# Patient Record
Sex: FEMALE | Race: WHITE | NOT HISPANIC OR LATINO | Employment: PART TIME | URBAN - METROPOLITAN AREA
[De-identification: names, ages, dates, MRNs, and addresses within clinical notes are randomized per-mention and may not be internally consistent; named-entity substitution may affect disease eponyms.]

---

## 2017-01-24 ENCOUNTER — OFFICE VISIT (OUTPATIENT)
Dept: FAMILY MEDICINE | Facility: CLINIC | Age: 62
End: 2017-01-24
Payer: COMMERCIAL

## 2017-01-24 VITALS
HEIGHT: 64 IN | TEMPERATURE: 98.2 F | DIASTOLIC BLOOD PRESSURE: 76 MMHG | SYSTOLIC BLOOD PRESSURE: 129 MMHG | WEIGHT: 239.8 LBS | BODY MASS INDEX: 40.94 KG/M2

## 2017-01-24 DIAGNOSIS — N30.01 ACUTE CYSTITIS WITH HEMATURIA: Primary | ICD-10-CM

## 2017-01-24 DIAGNOSIS — Z09 FOLLOW-UP EXAM AFTER TREATMENT: ICD-10-CM

## 2017-01-24 DIAGNOSIS — R39.9 UTI SYMPTOMS: ICD-10-CM

## 2017-01-24 LAB
ALBUMIN UR-MCNC: 100 MG/DL
APPEARANCE UR: CLEAR
BACTERIA #/AREA URNS HPF: ABNORMAL /HPF
BILIRUB UR QL STRIP: NEGATIVE
COLOR UR AUTO: YELLOW
GLUCOSE UR STRIP-MCNC: NEGATIVE MG/DL
HGB UR QL STRIP: ABNORMAL
KETONES UR STRIP-MCNC: 15 MG/DL
LEUKOCYTE ESTERASE UR QL STRIP: ABNORMAL
MUCOUS THREADS #/AREA URNS LPF: PRESENT /LPF
NITRATE UR QL: POSITIVE
NON-SQ EPI CELLS #/AREA URNS LPF: ABNORMAL /LPF
PH UR STRIP: 5 PH (ref 5–7)
RBC #/AREA URNS AUTO: ABNORMAL /HPF (ref 0–2)
SP GR UR STRIP: >1.03 (ref 1–1.03)
URN SPEC COLLECT METH UR: ABNORMAL
UROBILINOGEN UR STRIP-ACNC: 1 EU/DL (ref 0.2–1)
WBC #/AREA URNS AUTO: ABNORMAL /HPF (ref 0–2)

## 2017-01-24 PROCEDURE — 87186 SC STD MICRODIL/AGAR DIL: CPT | Performed by: FAMILY MEDICINE

## 2017-01-24 PROCEDURE — 99213 OFFICE O/P EST LOW 20 MIN: CPT | Performed by: FAMILY MEDICINE

## 2017-01-24 PROCEDURE — 87088 URINE BACTERIA CULTURE: CPT | Performed by: FAMILY MEDICINE

## 2017-01-24 PROCEDURE — 87086 URINE CULTURE/COLONY COUNT: CPT | Performed by: FAMILY MEDICINE

## 2017-01-24 PROCEDURE — 81001 URINALYSIS AUTO W/SCOPE: CPT | Performed by: FAMILY MEDICINE

## 2017-01-24 RX ORDER — NITROFURANTOIN 25; 75 MG/1; MG/1
100 CAPSULE ORAL 2 TIMES DAILY
Qty: 14 CAPSULE | Refills: 0 | Status: SHIPPED | OUTPATIENT
Start: 2017-01-24 | End: 2017-03-13

## 2017-01-24 NOTE — NURSING NOTE
"Chief Complaint   Patient presents with     UTI       Initial /76 mmHg  Temp(Src) 98.2  F (36.8  C) (Tympanic)  Ht 5' 4\" (1.626 m)  Wt 239 lb 12.8 oz (108.773 kg)  BMI 41.14 kg/m2  Breastfeeding? No Estimated body mass index is 41.14 kg/(m^2) as calculated from the following:    Height as of this encounter: 5' 4\" (1.626 m).    Weight as of this encounter: 239 lb 12.8 oz (108.773 kg).  BP completed using cuff size: large    "

## 2017-01-24 NOTE — PATIENT INSTRUCTIONS
Thank you for choosing [facility].  You may be receiving a survey in the mail from [facility] regarding your visit today.  Please take a few minutes to complete and return the survey to let us know how we are doing.      Our Clinic hours are:  Mondays    7:20 am - 7 pm  Tues -  Fri  7:20 am - 5 pm    Clinic Phone: [phone]    The clinic lab opens at 7:30 am Mon - Fri and appointments are required.    [facility]. [phone]  Monday-Thursday 8 am - 7pm  Tues/Wed/Fri 8 am - 5:30 pm

## 2017-01-24 NOTE — PROGRESS NOTES
"  SUBJECTIVE:                                                    Mirian Cruz is a 61 year old female who presents to clinic today for the following health issues:    Chief Complaint   Patient presents with     UTI     and concerns with mid back pain       URINARY TRACT SYMPTOMS     Onse2t: x 2 weeks     Description:   Painful urination (Dysuria): YES- at the end of urniation  Blood in urine (Hematuria): no   Delay in urine (Hesitency): YES    Intensity: moderate    Progression of Symptoms:  same    Accompanying Signs & Symptoms:  Fever/chills: no   Flank pain YES  Nausea and vomiting: no   Any vaginal symptoms: none  Abdominal/Pelvic Pain: YES   History:   History of frequent UTI's: YES  History of kidney stones: no   Sexually Active: no   Possibility of pregnancy: No    Precipitating factors:   -         Therapies Tried and outcome: AZO, Cranberry juice prn (contraindicated in Coumadin patients) and Increase fluid intake    Mirian is being followed by Urology for stress incontinence and recurrent UTIs.  Her most recent infection was E.coli with no resistance in early December. She presents now with recurrent symptoms.    OBJECTIVE: /76 mmHg  Temp(Src) 98.2  F (36.8  C) (Tympanic)  Ht 5' 4\" (1.626 m)  Wt 239 lb 12.8 oz (108.773 kg)  BMI 41.14 kg/m2  Breastfeeding? No    Results for orders placed or performed in visit on 01/24/17   *UA reflex to Microscopic   Result Value Ref Range    Color Urine Yellow     Appearance Urine Clear     Glucose Urine Negative NEG mg/dL    Bilirubin Urine Negative NEG    Ketones Urine 15 (A) NEG mg/dL    Specific Gravity Urine >1.030 1.003 - 1.035    Blood Urine Large (A) NEG    pH Urine 5.0 5.0 - 7.0 pH    Protein Albumin Urine 100 (A) NEG mg/dL    Urobilinogen Urine 1.0 0.2 - 1.0 EU/dL    Nitrite Urine Positive (A) NEG    Leukocyte Esterase Urine Small (A) NEG    Source Midstream Urine    Urine Microscopic   Result Value Ref Range    WBC Urine  (A) 0 - 2 /HPF "    RBC Urine 2-5 (A) 0 - 2 /HPF    Squamous Epithelial /LPF Urine Few FEW /LPF    Bacteria Urine Many (A) NEG /HPF    Mucous Urine Present (A) NEG /LPF     ASSESSMENT: UTI    PLAN: She has had some mild right flank discomfort suggestive of possible early renal involvement. Symptoms have been present for two weeks and have not responded just to frequent fluids. Her last treatment was a three day course of Septra, which she had taken in the past, but this time she broke out with mouth sores.  So this has been added to her allergy list, and she feels this time she would like a longer course of treatment in case the organism was not completely eradicated despite her improved symptoms.   Urine culture pending.   Rx nitrofurantoin 100 mg BID for one week.   Future order to recheck UA 24 hours after completing the antibiotic regimen.    Bianca Peralta md

## 2017-01-24 NOTE — MR AVS SNAPSHOT
After Visit Summary   1/24/2017    Mirian Cruz    MRN: [id number]           Patient Information     Date Of Birth          1955        Visit Information        Provider Department      1/24/2017 3:20 PM Bianca Peralta MD [facility]        Today's Diagnoses     Acute cystitis with hematuria    -  1     UTI symptoms         Follow-up exam after treatment           Care Instructions          Thank you for choosing [facility].  You may be receiving a survey in the [facility] your visit today.  Please take a few minutes to complete and return the survey to let us know how we are doing.      Our Clinic hours are:  Mondays    7:20 am - 7 pm  Tues -  Fri  7:20 am - 5 pm    Clinic Phone: [phone]    The clinic lab opens at 7:30 am Mon - Fri and appointments are required.    [facility]  Ph. [phone]  Monday-Thursday 8 am - 7pm  Tues/Wed/Fri 8 am - 5:30 pm               Follow-ups after your visit        Your next 10 appointments already scheduled     Apr 04, 2017  8:30 AM   Return Visit with Doni Benavides MD   [facility]    [address] [address]   [phone]              Future tests that were ordered for you today     Open Future Orders        Priority Expected Expires Ordered    UA with Microscopic reflex to Culture Routine 1/31/2017 2/7/2017 1/24/2017            Who to contact     If you have questions or need follow up information about today's clinic visit or your schedule please contact [facility] directly at [phone].  Normal or non-critical lab and imaging results will be communicated to you by MyChart, letter or phone within 4 business days after the clinic has received the results. If you do not hear from us within 7 days, please contact the clinic through MyChart or phone. If you have a critical or abnormal lab result,  "we will notify you by phone as soon as possible.  Submit refill requests through [employer] or call your pharmacy and they will forward the refill request to us. Please allow 3 business days for your refill to be completed.          Additional Information About Your Visit        [employer] Information     [employer] lets you send messages to your doctor, view your test results, renew your prescriptions, schedule appointments and more. To sign up, go to [email or web address] . Click on \"Log in\" on the left side of the screen, which will take you to the Welcome page. Then click on \"Sign up Now\" on the right side of the page.     You will be asked to enter the access code listed below, as well as some personal information. Please follow the directions to create your username and password.     Your access code is: [email or web address]  Expires: 3/1/2017  3:26 PM     Your access code will  in 90 days. If you need help or a new code, please call your [place] clinic or [phone].        Care EveryWhere ID     This is your Care EveryWhere ID. This could be used by other organizations to access your [place] medical records  [id number]        Your Vitals Were     Temperature Height BMI (Body Mass Index) Breastfeeding?          98.2  F (36.8  C) (Tympanic) 5' 4\" (1.626 m) 41.14 kg/m2 No         Blood Pressure from Last 3 Encounters:   17 129/76   16 119/82   10/04/16 150/79    Weight from Last 3 Encounters:   17 239 lb 12.8 oz (108.773 kg)   16 233 lb 6.4 oz (105.87 kg)   16 235 lb 6.4 oz (106.777 kg)              We Performed the Following     *UA reflex to Microscopic     Urine Culture Aerobic Bacterial     Urine Microscopic          Today's Medication Changes          These changes are accurate as of: 17  4:24 PM.  If you have any questions, ask your nurse or doctor.               Start taking these medicines.        Dose/Directions    nitrofurantoin (macrocrystal-monohydrate) 100 MG capsule "   Commonly known as:  MACROBID   Used for:  Acute cystitis with hematuria   Started by:  Bianca Peralta MD        Dose:  100 mg   Take 1 capsule (100 mg) by mouth 2 times daily   Quantity:  14 capsule   Refills:  0         Stop taking these medicines if you haven't already. Please contact your care team if you have questions.     sulfamethoxazole-trimethoprim 800-160 MG per tablet   Commonly known as:  BACTRIM DS/SEPTRA DS   Stopped by:  Bianca Peralta MD                Where to get your medicines      These medications were sent to [facility] [place] - [address]  [address], [place] [address]    Hours:  [facility] [place] [facility] Phone:  [phone]    - nitrofurantoin (macrocrystal-monohydrate) 100 MG capsule             Primary Care Provider Office Phone # Fax #    Bianca Peralta [phone]       [address] [address]        Thank you!     Thank you for choosing [facility]  for your care. Our goal is always to provide you with excellent care. Hearing back from our patients is one way we can continue to improve our services. Please take a few minutes to complete the written survey that you may receive in the mail after your visit with us. Thank you!             Your Updated Medication List - Protect others around you: Learn how to safely use, store and throw away your medicines at www.disposemymeds.org.          This list is accurate as of: 1/24/17  4:24 PM.  Always use your most recent med list.                   Brand Name Dispense Instructions for use    ascorbic acid 500 MG tablet    VITAMIN C    100 tablet    Take 1 tablet by mouth daily.       calcium 600 MG tablet      1 daily       cholecalciferol 1000 UNIT tablet    vitamin D     Take 1 tablet by mouth daily.       fish oil-omega-3 fatty acids 1000 MG capsule      1 daily       mirabegron 50 MG 24 hr tablet     MYRBETRIQ    90 tablet    Take 1 tablet (50 mg) by mouth daily       naproxen 500 MG tablet    NAPROSYN    60 tablet    Take 1 tablet (500 mg) by mouth 2 times daily (with meals) As needed for knee or hip pain       nitrofurantoin (macrocrystal-monohydrate) 100 MG capsule    MACROBID    14 capsule    Take 1 capsule (100 mg) by mouth 2 times daily       omeprazole 40 MG capsule    priLOSEC    90 capsule    Take 1 capsule (40 mg) by mouth daily Take 30-60 minutes before a meal.       vitamin E 400 UNIT capsule     100 capsule    Take 1 capsule by mouth daily.

## 2017-01-27 LAB
BACTERIA SPEC CULT: ABNORMAL
MICRO REPORT STATUS: ABNORMAL
MICROORGANISM SPEC CULT: ABNORMAL
SPECIMEN SOURCE: ABNORMAL

## 2017-02-08 ENCOUNTER — TRANSFERRED RECORDS (OUTPATIENT)
Dept: HEALTH INFORMATION MANAGEMENT | Facility: CLINIC | Age: 62
End: 2017-02-08

## 2017-02-08 DIAGNOSIS — Z09 FOLLOW-UP EXAM AFTER TREATMENT: ICD-10-CM

## 2017-02-08 DIAGNOSIS — N30.01 ACUTE CYSTITIS WITH HEMATURIA: ICD-10-CM

## 2017-02-08 LAB
ALBUMIN UR-MCNC: NEGATIVE MG/DL
APPEARANCE UR: CLEAR
BACTERIA #/AREA URNS HPF: ABNORMAL /HPF
BILIRUB UR QL STRIP: NEGATIVE
COLOR UR AUTO: YELLOW
GLUCOSE UR STRIP-MCNC: NEGATIVE MG/DL
HGB UR QL STRIP: NEGATIVE
KETONES UR STRIP-MCNC: NEGATIVE MG/DL
LEUKOCYTE ESTERASE UR QL STRIP: NEGATIVE
NITRATE UR QL: NEGATIVE
NON-SQ EPI CELLS #/AREA URNS LPF: ABNORMAL /LPF
PH UR STRIP: 7 PH (ref 5–7)
RBC #/AREA URNS AUTO: ABNORMAL /HPF (ref 0–2)
SP GR UR STRIP: 1.01 (ref 1–1.03)
URN SPEC COLLECT METH UR: ABNORMAL
UROBILINOGEN UR STRIP-ACNC: 1 EU/DL (ref 0.2–1)
WBC #/AREA URNS AUTO: ABNORMAL /HPF (ref 0–2)

## 2017-02-08 PROCEDURE — 81001 URINALYSIS AUTO W/SCOPE: CPT | Performed by: FAMILY MEDICINE

## 2017-03-13 ENCOUNTER — OFFICE VISIT (OUTPATIENT)
Dept: FAMILY MEDICINE | Facility: CLINIC | Age: 62
End: 2017-03-13
Payer: COMMERCIAL

## 2017-03-13 VITALS
HEART RATE: 83 BPM | DIASTOLIC BLOOD PRESSURE: 80 MMHG | SYSTOLIC BLOOD PRESSURE: 132 MMHG | BODY MASS INDEX: 40.32 KG/M2 | WEIGHT: 234.9 LBS

## 2017-03-13 DIAGNOSIS — N30.00 ACUTE CYSTITIS WITHOUT HEMATURIA: ICD-10-CM

## 2017-03-13 DIAGNOSIS — R30.0 DYSURIA: ICD-10-CM

## 2017-03-13 DIAGNOSIS — Z01.818 PREOP GENERAL PHYSICAL EXAM: Primary | ICD-10-CM

## 2017-03-13 DIAGNOSIS — R82.90 NONSPECIFIC FINDING ON EXAMINATION OF URINE: ICD-10-CM

## 2017-03-13 DIAGNOSIS — M17.12 PRIMARY OSTEOARTHRITIS OF LEFT KNEE: ICD-10-CM

## 2017-03-13 LAB
ALBUMIN UR-MCNC: 100 MG/DL
ANION GAP SERPL CALCULATED.3IONS-SCNC: 6 MMOL/L (ref 3–14)
APPEARANCE UR: CLEAR
BACTERIA #/AREA URNS HPF: ABNORMAL /HPF
BASOPHILS # BLD AUTO: 0 10E9/L (ref 0–0.2)
BASOPHILS NFR BLD AUTO: 0.5 %
BILIRUB UR QL STRIP: NEGATIVE
BUN SERPL-MCNC: 15 MG/DL (ref 7–30)
CALCIUM SERPL-MCNC: 8.8 MG/DL (ref 8.5–10.1)
CHLORIDE SERPL-SCNC: 103 MMOL/L (ref 94–109)
CO2 SERPL-SCNC: 28 MMOL/L (ref 20–32)
COLOR UR AUTO: YELLOW
CREAT SERPL-MCNC: 0.7 MG/DL (ref 0.52–1.04)
DIFFERENTIAL METHOD BLD: NORMAL
EOSINOPHIL # BLD AUTO: 0.2 10E9/L (ref 0–0.7)
EOSINOPHIL NFR BLD AUTO: 3 %
ERYTHROCYTE [DISTWIDTH] IN BLOOD BY AUTOMATED COUNT: 11.7 % (ref 10–15)
GFR SERPL CREATININE-BSD FRML MDRD: 85 ML/MIN/1.7M2
GLUCOSE SERPL-MCNC: 104 MG/DL (ref 70–99)
GLUCOSE UR STRIP-MCNC: NEGATIVE MG/DL
HCT VFR BLD AUTO: 39.4 % (ref 35–47)
HGB BLD-MCNC: 13.3 G/DL (ref 11.7–15.7)
HGB UR QL STRIP: ABNORMAL
KETONES UR STRIP-MCNC: NEGATIVE MG/DL
LEUKOCYTE ESTERASE UR QL STRIP: ABNORMAL
LYMPHOCYTES # BLD AUTO: 1.8 10E9/L (ref 0.8–5.3)
LYMPHOCYTES NFR BLD AUTO: 29.2 %
MCH RBC QN AUTO: 29.9 PG (ref 26.5–33)
MCHC RBC AUTO-ENTMCNC: 33.8 G/DL (ref 31.5–36.5)
MCV RBC AUTO: 89 FL (ref 78–100)
MONOCYTES # BLD AUTO: 0.4 10E9/L (ref 0–1.3)
MONOCYTES NFR BLD AUTO: 7.2 %
NEUTROPHILS # BLD AUTO: 3.7 10E9/L (ref 1.6–8.3)
NEUTROPHILS NFR BLD AUTO: 60.1 %
NITRATE UR QL: POSITIVE
NON-SQ EPI CELLS #/AREA URNS LPF: ABNORMAL /LPF
PH UR STRIP: 5 PH (ref 5–7)
PLATELET # BLD AUTO: 157 10E9/L (ref 150–450)
POTASSIUM SERPL-SCNC: 4.2 MMOL/L (ref 3.4–5.3)
RBC # BLD AUTO: 4.45 10E12/L (ref 3.8–5.2)
RBC #/AREA URNS AUTO: ABNORMAL /HPF (ref 0–2)
SODIUM SERPL-SCNC: 137 MMOL/L (ref 133–144)
SP GR UR STRIP: >1.03 (ref 1–1.03)
URN SPEC COLLECT METH UR: ABNORMAL
UROBILINOGEN UR STRIP-ACNC: 1 EU/DL (ref 0.2–1)
WBC # BLD AUTO: 6.1 10E9/L (ref 4–11)
WBC #/AREA URNS AUTO: ABNORMAL /HPF (ref 0–2)

## 2017-03-13 PROCEDURE — 87088 URINE BACTERIA CULTURE: CPT | Performed by: FAMILY MEDICINE

## 2017-03-13 PROCEDURE — 87186 SC STD MICRODIL/AGAR DIL: CPT | Performed by: FAMILY MEDICINE

## 2017-03-13 PROCEDURE — 80048 BASIC METABOLIC PNL TOTAL CA: CPT | Performed by: FAMILY MEDICINE

## 2017-03-13 PROCEDURE — 87086 URINE CULTURE/COLONY COUNT: CPT | Performed by: FAMILY MEDICINE

## 2017-03-13 PROCEDURE — 36415 COLL VENOUS BLD VENIPUNCTURE: CPT | Performed by: FAMILY MEDICINE

## 2017-03-13 PROCEDURE — 85025 COMPLETE CBC W/AUTO DIFF WBC: CPT | Performed by: FAMILY MEDICINE

## 2017-03-13 PROCEDURE — 81001 URINALYSIS AUTO W/SCOPE: CPT | Performed by: FAMILY MEDICINE

## 2017-03-13 PROCEDURE — 99214 OFFICE O/P EST MOD 30 MIN: CPT | Performed by: FAMILY MEDICINE

## 2017-03-13 RX ORDER — CEPHALEXIN 500 MG/1
500 CAPSULE ORAL 3 TIMES DAILY
Qty: 21 CAPSULE | Refills: 0 | Status: SHIPPED | OUTPATIENT
Start: 2017-03-13 | End: 2017-03-31

## 2017-03-13 RX ORDER — TRAMADOL HYDROCHLORIDE 50 MG/1
TABLET ORAL
Status: ON HOLD | COMMUNITY
Start: 2017-02-03 | End: 2017-04-06

## 2017-03-13 NOTE — PATIENT INSTRUCTIONS
Before Your Surgery    Call your surgeon if there is any change in your health. This includes signs of a cold or flu (such as a sore throat, runny nose, cough, rash or fever).    Do not smoke, drink alcohol or take over the counter medicine (unless your surgeon or primary care doctor tells you to) for the 24 hours before and after surgery.    If you take prescribed drugs: Follow your doctor s orders about which medicines to take and which to stop until after surgery.    Eating and drinking prior to surgery: follow the instructions from your surgeon    Take a shower or bath the night before surgery. Use the soap your surgeon gave you to gently clean your skin. If you do not have soap from your surgeon, use your regular soap. Do not shave or scrub the surgery site.  Wear clean pajamas and have clean sheets on your bed.     Health Maintenance   Topic Date Due     HEPATITIS C SCREENING  05/05/1975     INFLUENZA VACCINE (SYSTEM ASSIGNED)  09/01/2017     ADVANCE DIRECTIVE PLANNING Q5 YRS (NO INBASKET)  01/10/2018     MAMMO SCREEN Q2 YR (SYSTEM ASSIGNED)  04/25/2018     PAP Q3 YR  04/25/2019     TETANUS IMMUNIZATION (SYSTEM ASSIGNED)  07/14/2019     COLON CANCER SCREEN (SYSTEM ASSIGNED)  07/14/2019     LIPID SCREEN Q5 YR FEMALE (SYSTEM ASSIGNED)  04/12/2021     PAP Q5 YEARS  04/25/2021     HPV Q5 YEARS (Complete with PAP)  04/25/2021           Thank you for choosing [facility].  You may be receiving a survey in the mail from [facility] regarding your visit today.  Please take a few minutes to complete and return the survey to let us know how we are doing.      Our Clinic hours are:  Mondays    7:20 am - 7 pm  Tues -  Fri  7:20 am - 5 pm    Clinic Phone: [phone]    The clinic lab opens at 7:30 am Mon - Fri and appointments are required.    [facility]. [phone]  Monday-Thursday 8 am - 7pm  Tues/Wed/Fri 8 am - 5:30 pm     Take the antibiotic and we will call you with the culture. Once  treatment is completed, make a lab appointment to come in and recheck the urine so we have  A normal specimen recorded before surgery.

## 2017-03-13 NOTE — MR AVS SNAPSHOT
After Visit Summary   3/13/2017    Mirian Cruz    MRN: [id number]           Patient Information     Date Of Birth          1955        Visit Information        Provider Department      3/13/2017 9:20 AM Bianca Peralta MD [facility]        Today's Diagnoses     Preop general physical exam    -  1    Primary osteoarthritis of left knee        Acute cystitis without hematuria        Dysuria        Nonspecific finding on examination of urine          Care Instructions      Before Your Surgery    Call your surgeon if there is any change in your health. This includes signs of a cold or flu (such as a sore throat, runny nose, cough, rash or fever).    Do not smoke, drink alcohol or take over the counter medicine (unless your surgeon or primary care doctor tells you to) for the 24 hours before and after surgery.    If you take prescribed drugs: Follow your doctor s orders about which medicines to take and which to stop until after surgery.    Eating and drinking prior to surgery: follow the instructions from your surgeon    Take a shower or bath the night before surgery. Use the soap your surgeon gave you to gently clean your skin. If you do not have soap from your surgeon, use your regular soap. Do not shave or scrub the surgery site.  Wear clean pajamas and have clean sheets on your bed.     Health Maintenance   Topic Date Due     HEPATITIS C SCREENING  05/05/1975     INFLUENZA VACCINE (SYSTEM ASSIGNED)  09/01/2017     ADVANCE DIRECTIVE PLANNING Q5 YRS (NO INBASKET)  01/10/2018     MAMMO SCREEN Q2 YR (SYSTEM ASSIGNED)  04/25/2018     PAP Q3 YR  04/25/2019     TETANUS IMMUNIZATION (SYSTEM ASSIGNED)  07/14/2019     COLON CANCER SCREEN (SYSTEM ASSIGNED)  07/14/2019     LIPID SCREEN Q5 YR FEMALE (SYSTEM ASSIGNED)  04/12/2021     PAP Q5 YEARS  04/25/2021     HPV Q5 YEARS (Complete with PAP)  04/25/2021           Thank you for choosing [facility].  You may be receiving a  survey in the mail from [facility] regarding your visit today.  Please take a few minutes to complete and return the survey to let us know how we are doing.      Our Clinic hours are:  Mondays    7:20 am - 7 pm  Tues -  Fri  7:20 am - 5 pm    Clinic Phone: [phone]    The clinic lab opens at 7:30 am Mon - Fri and appointments are required.    [facility] [place]  Ph. [phone]  Monday-Thursday 8 am - 7pm  Tues/Wed/Fri 8 am - 5:30 pm     Take the antibiotic and we will call you with the culture. Once treatment is completed, make a lab appointment to come in and recheck the urine so we have  A normal specimen recorded before surgery.        Follow-ups after your visit        Your next 10 appointments already scheduled     Apr 04, 2017  8:30 AM CDT   Return Visit with Doni [facility]ides MD   [facility]    [address] [address]   [phone]            Apr 05, 2017   Procedure with Zelalem Mendez MD   [facility] (--)    [address] [address]   [phone]           The medical center is located at [address] (between [address] in [place], [place] north of [place]).              Future tests that were ordered for you today     Open Future Orders        Priority Expected Expires Ordered    UA with Microscopic reflex to Culture Routine 3/20/2017 3/30/2017 3/13/2017            Who to contact     If you have questions or need follow up information about today's clinic visit or your schedule please contact [facility] directly at [phone].  Normal or non-critical lab and imaging results will be communicated to you by MyChart, letter or phone within 4 business days after the clinic has received the results. If you do not hear from us within 7 days, please contact the clinic through MyChart or phone. If you have a critical or abnormal lab result, we  "will notify you by phone as soon as possible.  Submit refill requests through [employer] or call your pharmacy and they will forward the refill request to us. Please allow 3 business days for your refill to be completed.          Additional Information About Your Visit        [employer] Information     [employer] lets you send messages to your doctor, view your test results, renew your prescriptions, schedule appointments and more. To sign up, go to [email or web address] . Click on \"Log in\" on the left side of the screen, which will take you to the Welcome page. Then click on \"Sign up Now\" on the right side of the page.     You will be asked to enter the access code listed below, as well as some personal information. Please follow the directions to create your username and password.     Your access code is: [email or web address]  Expires: 2017 10:12 AM     Your access code will  in 90 days. If you need help or a new code, please call your [place] clinic or [phone].        Care EveryWhere ID     This is your Care EveryWhere ID. This could be used by other organizations to access your [place] medical records  [id number]        Your Vitals Were     Pulse BMI (Body Mass Index)                83 40.32 kg/m2           Blood Pressure from Last 3 Encounters:   17 132/80   17 129/76   16 119/82    Weight from Last 3 Encounters:   17 234 lb 14.4 oz (106.5 kg)   17 239 lb 12.8 oz (108.8 kg)   16 233 lb 6.4 oz (105.9 kg)              We Performed the Following     *UA reflex to Microscopic and Culture ([facility] (except [place] and [place])     Urine Culture Aerobic Bacterial     Urine Microscopic          Today's Medication Changes          These changes are accurate as of: 3/13/17 10:12 AM.  If you have any questions, ask your nurse or doctor.               Start taking these medicines.        Dose/Directions    cephALEXin 500 MG capsule   Commonly known " as:  KEFLEX   Used for:  Acute cystitis without hematuria   Started by:  Bianca Peralta MD        Dose:  500 mg   Take 1 capsule (500 mg) by mouth 3 times daily   Quantity:  21 capsule   Refills:  0            Where to get your medicines      These medications were sent to [facility] [place] - [address]  [address], [place] [address]    Hours:  [facility] Phone:  [phone]     cephALEXin 500 MG capsule                Primary Care Provider Office Phone # Fax #    Bianca Peralta [phone]       [facility] [address] [address]        Thank you!     Thank you for choosing [facility]  for your care. Our goal is always to provide you with excellent care. Hearing back from our patients is one way we can continue to improve our services. Please take a few minutes to complete the written survey that you may receive in the mail after your visit with us. Thank you!             Your Updated Medication List - Protect others around you: Learn how to safely use, store and throw away your medicines at www.disposemymeds.org.          This list is accurate as of: 3/13/17 10:12 AM.  Always use your most recent med list.                   Brand Name Dispense Instructions for use    ascorbic acid 500 MG tablet    VITAMIN C    100 tablet    Take 1 tablet by mouth daily.       calcium 600 MG tablet      1 daily       cephALEXin 500 MG capsule    KEFLEX    21 capsule    Take 1 capsule (500 mg) by mouth 3 times daily       cholecalciferol 1000 UNIT tablet    vitamin D     Take 1 tablet by mouth daily.       fish oil-omega-3 fatty acids 1000 MG capsule      1 daily       mirabegron 50 MG 24 hr tablet    MYRBETRIQ    90 tablet    Take 1 tablet (50 mg) by mouth daily       naproxen 500 MG tablet    NAPROSYN    60 tablet    Take 1 tablet (500 mg) by mouth 2 times daily (with meals) As needed for knee or  hip pain       omeprazole 40 MG capsule    priLOSEC    90 capsule    Take 1 capsule (40 mg) by mouth daily Take 30-60 minutes before a meal.       traMADol 50 MG tablet    ULTRAM         vitamin E 400 UNIT capsule     100 capsule    Take 1 capsule by mouth daily.

## 2017-03-13 NOTE — PROGRESS NOTES
[facility]  [address]  [facility] [address]  [phone]  Dept: [phone]    PRE-OP EVALUATION:  Today's date: 3/13/2017    Mirian Cruz (: 1955) presents for pre-operative evaluation assessment as requested by Dr. Mendez.  She requires evaluation and anesthesia risk assessment prior to undergoing surgery/procedure for treatment of Knee Left .  Proposed procedure: Total Left Knee Arthroplasty    Date of Surgery/ Procedure: 2017  Time of Surgery/ Procedure: 12:00am   Hospital/Surgical Facility: [place]  Primary Physician: Bianca Peralta  Type of Anesthesia Anticipated: to be determined    Patient has a Health Care Directive or Living Will:  NO    1. NO - Do you have a history of heart attack, stroke, stent, bypass or surgery on an artery in the head, neck, heart or legs?  2. NO - Do you ever have any pain or discomfort in your chest?  3. NO - Do you have a history of  Heart Failure?  4. NO - Are you troubled by shortness of breath when: walking on the level, up a slight hill or at night?  5. NO - Do you currently have a cold, bronchitis or other respiratory infection?  6. NO - Do you have a cough, shortness of breath or wheezing?  7. NO - Do you sometimes get pains in the calves of your legs when you walk?  8. NO - Do you or anyone in your family have previous history of blood clots?  9. NO - Do you or does anyone in your family have a serious bleeding problem such as prolonged bleeding following surgeries or cuts?  10. NO - Have you ever had problems with anemia or been told to take iron pills?  11. NO - Have you had any abnormal blood loss such as black, tarry or bloody stools, or abnormal vaginal bleeding?  12. NO - Have you ever had a blood transfusion?  13. NO - Have you or any of your relatives ever had problems with anesthesia?  14. NO - Do you have sleep apnea, excessive snoring or daytime drowsiness?  15. NO - DO YOU HAVE ANY PROSTHETIC HEART  VALVES?  15. NO - Do you have any prosthetic heart valves?  16. YES - DO YOU HAVE PROSTHETIC JOINTS? Right knee surgery   17. NO - Is there any chance that you may be pregnant?      HPI:                                                      Brief HPI related to upcoming procedure: Mirian Cruz is a 61 year old female with end stage arthritis of her left knee, now scheduled for left knee replacement.    See problem list for active medical problems.  Problems all longstanding and stable, except as noted/documented.  See ROS for pertinent symptoms related to these conditions.                                                                                                  .    MEDICAL HISTORY:                                                      Patient Active Problem List    Diagnosis Date Noted     Prediabetes 04/13/2016     Priority: Medium     Status post total right knee replacement 02/18/2015     Priority: Medium     OA (osteoarthritis) of knee 02/10/2015     Priority: Medium     Urinary, incontinence, stress female 05/07/2013     Priority: Medium     Urethral hypermobility 05/07/2013     Priority: Medium     Advanced directives, counseling/discussion 01/10/2013     Priority: Medium     Patient does not have an Advance/Health Care Directive (HCD), declines information/referral.    Mai Muse  January 10, 2013         Sleep related leg cramps 01/10/2013     Priority: Medium     Improve with gabapentin       GBS (Guillain-[place] syndrome) (H) 10/08/2011     Priority: Medium     Hospitalized 10/1/2011 - 10/8/2011  In acute rehab 10/9/2011 - 10/18/2011       Hyperlipidemia LDL goal <130 10/31/2010     Priority: Medium     GERD (gastroesophageal reflux disease) 10/27/2009     Priority: Medium     Morbid obesity (H) 10/27/2009     Priority: Medium     Tobacco use disorder 08/20/2008     Priority: Medium     Very rare use of cigarette, does use some e-cigarettes.  4/24/16 -- patient stopped all nicotine a few  weeks ago        No past medical history on file.  Past Surgical History   Procedure Laterality Date     Tubal ligation       Bunionectomy chevron and jenni, combined  3/25/2011     COMBINED BUNIONECTOMY CHEVRON AND JENNI performed by TRISTON SEWELL at [facility]     Remove hardware foot  2/15/2012     Procedure:REMOVE HARDWARE FOOT; Removal of hardware left foot; Surgeon:TRISTON SEWELL; Location:[facility] OR     Sling transvaginal  5/20/2013     Procedure: SLING TRANSVAGINAL;  Transvaginal taping, cystoscopy;  Surgeon: Adan Morrison MD;  Location: [facility]     Cystoscopy  5/20/2013     Procedure: CYSTOSCOPY;;  Surgeon: Adan Morrison MD;  Location: [facility]     Arthroplasty knee Right 2/18/2015     Procedure: ARTHROPLASTY KNEE;  Surgeon: Zelalem Mendez MD;  Location: [facility]     Current Outpatient Prescriptions   Medication Sig Dispense Refill     naproxen (NAPROSYN) 500 MG tablet Take 1 tablet (500 mg) by mouth 2 times daily (with meals) As needed for knee or hip pain 60 tablet 5     mirabegron (MYRBETRIQ) 50 MG 24 hr tablet Take 1 tablet (50 mg) by mouth daily 90 tablet 1     omeprazole (PRILOSEC) 40 MG capsule Take 1 capsule (40 mg) by mouth daily Take 30-60 minutes before a meal. 90 capsule 3     cholecalciferol (VITAMIN D) 1000 UNIT tablet Take 1 tablet by mouth daily.       ascorbic acid (VITAMIN C) 500 MG tablet Take 1 tablet by mouth daily. 100 tablet 12     vitamin E 400 UNIT capsule Take 1 capsule by mouth daily. 100 capsule 12     CALCIUM 600 MG OR TABS 1 daily       FISH OIL 1000 MG OR CAPS 1 daily       traMADol (ULTRAM) 50 MG tablet        OTC products: None, except as noted above    Allergies   Allergen Reactions     Septra [Sulfamethoxazole W/Trimethoprim] Other (See Comments)     Stomatitis with mouth ulcerations     Influenza Vaccine Live      History of Guillain-[place]      Latex Allergy: NO    Social History   Substance Use Topics     Smoking status: Former Smoker     Types:  Cigarettes     Quit date: 7/18/2015     Smokeless tobacco: Never Used     Alcohol use 0.0 oz/week     0 Standard drinks or equivalent per week      Comment: occ     History   Drug Use No       REVIEW OF SYSTEMS:                                                    C: NEGATIVE for fever, chills, change in weight  I: NEGATIVE for worrisome rashes, moles or lesions  E: NEGATIVE for vision changes or irritation  E/M: NEGATIVE for ear, mouth and throat problems  R: NEGATIVE for significant cough or SOB  CV: NEGATIVE for chest pain, palpitations or peripheral edema  GI: NEGATIVE for nausea, abdominal pain, heartburn, or change in bowel habits   female: history of recurrent UTI, now with symptoms again --UA/UC pending  MUSCULOSKELETAL:other than knee pain, she does note some numbness/pain in her hands which she thinks is carpal tunnel -- she is just starting the use of wrist braces and will follow up on this at a later date  N: NEGATIVE for weakness, dizziness or paresthesias  E: NEGATIVE for temperature intolerance, skin/hair changes  H: NEGATIVE for bleeding problems  P: NEGATIVE for changes in mood or affect    EXAM:                                                    /80  Pulse 83  Wt 234 lb 14.4 oz (106.5 kg)  BMI 40.32 kg/m2    GENERAL APPEARANCE: healthy, alert, active and obese     EYES: EOMI, PERRL     HENT: ear canals and TM's normal and nose and mouth without ulcers or lesions     NECK: no adenopathy, no asymmetry, masses, or scars and thyroid normal to palpation     RESP: lungs clear to auscultation - no rales, rhonchi or wheezes     CV: regular rates and rhythm, normal S1 S2, no S3 or S4 and peripheral pulses strong     ABDOMEN: soft, nontender, without hepatosplenomegaly or masses     MS: peripheral pulses normal and limping due to left knee pain     NEURO: Normal strength and tone, sensory exam grossly normal, mentation intact and speech normal     PSYCH: mentation appears normal. and affect  normal/bright     LYMPHATICS: No axillary, cervical, or supraclavicular nodes    DIAGNOSTICS:                                                      Results for orders placed or performed in visit on 03/13/17   *UA reflex to Microscopic and Culture ([facility] (except [place] and [place])   Result Value Ref Range    Color Urine Yellow     Appearance Urine Clear     Glucose Urine Negative NEG mg/dL    Bilirubin Urine Negative NEG    Ketones Urine Negative NEG mg/dL    Specific Gravity Urine >1.030 1.003 - 1.035    Blood Urine Moderate (A) NEG    pH Urine 5.0 5.0 - 7.0 pH    Protein Albumin Urine 100 (A) NEG mg/dL    Urobilinogen Urine 1.0 0.2 - 1.0 EU/dL    Nitrite Urine Positive (A) NEG    Leukocyte Esterase Urine Small (A) NEG    Source Midstream Urine    Urine Microscopic   Result Value Ref Range    WBC Urine 10-25 (A) 0 - 2 /HPF    RBC Urine 5-10 (A) 0 - 2 /HPF    Squamous Epithelial /LPF Urine Few FEW /LPF    Bacteria Urine Few (A) NEG /HPF     Component      Latest Ref Rng & Units 3/13/2017   WBC      4.0 - 11.0 10e9/L 6.1   RBC Count      3.8 - 5.2 10e12/L 4.45   Hemoglobin      11.7 - 15.7 g/dL 13.3   Hematocrit      35.0 - 47.0 % 39.4   MCV      78 - 100 fl 89   MCH      26.5 - 33.0 pg 29.9   MCHC      31.5 - 36.5 g/dL 33.8   RDW      10.0 - 15.0 % 11.7   Platelet Count      150 - 450 10e9/L 157   Diff Method       Automated Method   % Neutrophils      % 60.1   % Lymphocytes      % 29.2   % Monocytes      % 7.2   % Eosinophils      % 3.0   % Basophils      % 0.5   Absolute Neutrophil      1.6 - 8.3 10e9/L 3.7   Absolute Lymphocytes      0.8 - 5.3 10e9/L 1.8   Absolute Monocytes      0.0 - 1.3 10e9/L 0.4   Absolute Eosinophils      0.0 - 0.7 10e9/L 0.2   Absolute Basophils      0.0 - 0.2 10e9/L 0.0   Sodium      133 - 144 mmol/L 137   Potassium      3.4 - 5.3 mmol/L 4.2   Chloride      94 - 109 mmol/L 103   Carbon Dioxide      20 - 32 mmol/L 28   Anion Gap      3 - 14 mmol/L 6    Glucose      70 - 99 mg/dL 104 (H)   Urea Nitrogen      7 - 30 mg/dL 15   Creatinine      0.52 - 1.04 mg/dL 0.70   GFR Estimate      >60 mL/min/1.7m2 85   GFR Estimate If Black      >60 mL/min/1.7m2 >90 . . .   Calcium      8.5 - 10.1 mg/dL 8.8       Recent Labs   Lab Test  04/12/16   0917  02/21/15   0620  02/20/15   0624  02/19/15   0610  02/18/15   1935  02/10/15   1636  05/08/14   1622   HGB   --    --   11.0*  11.5*   --   14.0   --    PLT   --   123*   --    --   148*  172   --    NA   --    --    --    --    --   140  142   POTASSIUM   --    --    --    --    --   4.7  4.3   CR   --    --    --    --   0.64  0.78  0.74   A1C  5.9   --    --    --    --    --   5.8        IMPRESSION:                                                    Reason for surgery/procedure: end stage arthritis left knee, scheduled for knee replacement    The proposed surgical procedure is considered INTERMEDIATE risk.    REVISED CARDIAC RISK INDEX  The patient has the following serious cardiovascular risks for perioperative complications such as (MI, PE, VFib and 3  AV Block):  No serious cardiac risks  INTERPRETATION: 0 risks: Class I (very low risk - 0.4% complication rate)    The patient has the following additional risks for perioperative complications:  No identified additional risks      ICD-10-CM    1. Preop general physical exam Z01.818 Urine Microscopic   2. Primary osteoarthritis of left knee M17.12    3. Acute cystitis without hematuria N30.00    4. Dysuria R30.0 *UA reflex to Microscopic and Culture ([facility] (except [place] and [place])   5. Nonspecific finding on examination of urine R82.90 Urine Culture Aerobic Bacterial       RECOMMENDATIONS:                                                    Patient will hold all medications the AM of surgery.  We will start her on cephalexin today for one week for the current UTI and ajust this if needed once the culture is back, will also plan to  recheck UA after treatment.          APPROVAL GIVEN to proceed with proposed procedure, without further diagnostic evaluation       Signed Electronically by: Bianca Peralta MD        3/31/17 addendum:  Follow-up UA after treatment:    Component      Latest Ref Rng & Units 3/30/2017   Color Urine       Yellow   Appearance Urine       Clear   Glucose Urine      NEG mg/dL Negative   Bilirubin Urine      NEG Negative   Ketones Urine      NEG mg/dL 15 (A)   Specific Gravity Urine      1.003 - 1.035 >1.030   pH Urine      5.0 - 7.0 pH 5.0   Protein Albumin Urine      NEG mg/dL 100 (A)   Urobilinogen Urine      0.2 - 1.0 EU/dL 0.2   Nitrite Urine      NEG Negative   Blood Urine      NEG Negative   Leukocyte Esterase Urine      NEG Negative   Source       Midstream Urine   WBC Urine      0 - 2 /HPF O - 2   RBC Urine      0 - 2 /HPF O - 2   Squamous Epithelial /LPF Urine      FEW /LPF Few       Bianca Peralta md    Copy of this evaluation report is provided to requesting physician.    [facility] Preop Guidelines

## 2017-03-19 ENCOUNTER — TRANSFERRED RECORDS (OUTPATIENT)
Dept: HEALTH INFORMATION MANAGEMENT | Facility: CLINIC | Age: 62
End: 2017-03-19

## 2017-03-30 DIAGNOSIS — Z01.818 PREOP GENERAL PHYSICAL EXAM: ICD-10-CM

## 2017-03-30 LAB
ALBUMIN UR-MCNC: 100 MG/DL
APPEARANCE UR: CLEAR
BILIRUB UR QL STRIP: NEGATIVE
COLOR UR AUTO: YELLOW
GLUCOSE UR STRIP-MCNC: NEGATIVE MG/DL
HGB UR QL STRIP: NEGATIVE
KETONES UR STRIP-MCNC: 15 MG/DL
LEUKOCYTE ESTERASE UR QL STRIP: NEGATIVE
NITRATE UR QL: NEGATIVE
NON-SQ EPI CELLS #/AREA URNS LPF: ABNORMAL /LPF
PH UR STRIP: 5 PH (ref 5–7)
RBC #/AREA URNS AUTO: ABNORMAL /HPF (ref 0–2)
SP GR UR STRIP: >1.03 (ref 1–1.03)
URN SPEC COLLECT METH UR: ABNORMAL
UROBILINOGEN UR STRIP-ACNC: 0.2 EU/DL (ref 0.2–1)
WBC #/AREA URNS AUTO: ABNORMAL /HPF (ref 0–2)

## 2017-03-30 PROCEDURE — 81001 URINALYSIS AUTO W/SCOPE: CPT | Performed by: FAMILY MEDICINE

## 2017-04-03 ENCOUNTER — ANESTHESIA EVENT (OUTPATIENT)
Dept: SURGERY | Facility: CLINIC | Age: 62
DRG: 470 | End: 2017-04-03
Payer: COMMERCIAL

## 2017-04-05 ENCOUNTER — APPOINTMENT (OUTPATIENT)
Dept: GENERAL RADIOLOGY | Facility: CLINIC | Age: 62
DRG: 470 | End: 2017-04-05
Attending: ORTHOPAEDIC SURGERY
Payer: COMMERCIAL

## 2017-04-05 ENCOUNTER — HOSPITAL ENCOUNTER (INPATIENT)
Facility: CLINIC | Age: 62
LOS: 3 days | Discharge: HOME HEALTH | DRG: 470 | End: 2017-04-08
Attending: ORTHOPAEDIC SURGERY | Admitting: ORTHOPAEDIC SURGERY
Payer: COMMERCIAL

## 2017-04-05 ENCOUNTER — ANESTHESIA (OUTPATIENT)
Dept: SURGERY | Facility: CLINIC | Age: 62
DRG: 470 | End: 2017-04-05
Payer: COMMERCIAL

## 2017-04-05 DIAGNOSIS — Z96.652 STATUS POST TOTAL LEFT KNEE REPLACEMENT: Primary | ICD-10-CM

## 2017-04-05 PROBLEM — M17.12 LEFT KNEE DJD: Status: ACTIVE | Noted: 2017-04-05

## 2017-04-05 LAB
BASOPHILS # BLD AUTO: 0 10E9/L (ref 0–0.2)
BASOPHILS NFR BLD AUTO: 0.5 %
DIFFERENTIAL METHOD BLD: NORMAL
EOSINOPHIL # BLD AUTO: 0.2 10E9/L (ref 0–0.7)
EOSINOPHIL NFR BLD AUTO: 3.9 %
ERYTHROCYTE [DISTWIDTH] IN BLOOD BY AUTOMATED COUNT: 11.8 % (ref 10–15)
HCT VFR BLD AUTO: 42.7 % (ref 35–47)
HGB BLD-MCNC: 14.4 G/DL (ref 11.7–15.7)
IMM GRANULOCYTES # BLD: 0 10E9/L (ref 0–0.4)
IMM GRANULOCYTES NFR BLD: 0.2 %
INR PPP: 0.92 (ref 0.86–1.14)
LYMPHOCYTES # BLD AUTO: 2.1 10E9/L (ref 0.8–5.3)
LYMPHOCYTES NFR BLD AUTO: 33.7 %
MCH RBC QN AUTO: 29.3 PG (ref 26.5–33)
MCHC RBC AUTO-ENTMCNC: 33.7 G/DL (ref 31.5–36.5)
MCV RBC AUTO: 87 FL (ref 78–100)
MONOCYTES # BLD AUTO: 0.5 10E9/L (ref 0–1.3)
MONOCYTES NFR BLD AUTO: 8.2 %
NEUTROPHILS # BLD AUTO: 3.3 10E9/L (ref 1.6–8.3)
NEUTROPHILS NFR BLD AUTO: 53.5 %
PLATELET # BLD AUTO: 177 10E9/L (ref 150–450)
RBC # BLD AUTO: 4.92 10E12/L (ref 3.8–5.2)
WBC # BLD AUTO: 6.2 10E9/L (ref 4–11)

## 2017-04-05 PROCEDURE — 71000012 ZZH RECOVERY PHASE 1 LEVEL 1 FIRST HR: Performed by: ORTHOPAEDIC SURGERY

## 2017-04-05 PROCEDURE — 27810169 ZZH OR IMPLANT GENERAL: Performed by: ORTHOPAEDIC SURGERY

## 2017-04-05 PROCEDURE — 37000009 ZZH ANESTHESIA TECHNICAL FEE, EACH ADDTL 15 MIN: Performed by: ORTHOPAEDIC SURGERY

## 2017-04-05 PROCEDURE — 25000128 H RX IP 250 OP 636: Performed by: PHYSICIAN ASSISTANT

## 2017-04-05 PROCEDURE — 36000063 ZZH SURGERY LEVEL 4 EA 15 ADDTL MIN: Performed by: ORTHOPAEDIC SURGERY

## 2017-04-05 PROCEDURE — 36000093 ZZH SURGERY LEVEL 4 1ST 30 MIN: Performed by: ORTHOPAEDIC SURGERY

## 2017-04-05 PROCEDURE — 40000940 XR KNEE PORT LT 1/2 VW: Mod: LT

## 2017-04-05 PROCEDURE — 36415 COLL VENOUS BLD VENIPUNCTURE: CPT | Performed by: PHYSICIAN ASSISTANT

## 2017-04-05 PROCEDURE — 25800025 ZZH RX 258: Performed by: NURSE ANESTHETIST, CERTIFIED REGISTERED

## 2017-04-05 PROCEDURE — 0SRD0J9 REPLACEMENT OF LEFT KNEE JOINT WITH SYNTHETIC SUBSTITUTE, CEMENTED, OPEN APPROACH: ICD-10-PCS | Performed by: ORTHOPAEDIC SURGERY

## 2017-04-05 PROCEDURE — 25000125 ZZHC RX 250: Performed by: PHYSICIAN ASSISTANT

## 2017-04-05 PROCEDURE — 25000132 ZZH RX MED GY IP 250 OP 250 PS 637: Performed by: PHYSICIAN ASSISTANT

## 2017-04-05 PROCEDURE — 40000305 ZZH STATISTIC PRE PROC ASSESS I: Performed by: ORTHOPAEDIC SURGERY

## 2017-04-05 PROCEDURE — 27210794 ZZH OR GENERAL SUPPLY STERILE: Performed by: ORTHOPAEDIC SURGERY

## 2017-04-05 PROCEDURE — 25000128 H RX IP 250 OP 636: Performed by: NURSE ANESTHETIST, CERTIFIED REGISTERED

## 2017-04-05 PROCEDURE — 25000125 ZZHC RX 250: Performed by: NURSE ANESTHETIST, CERTIFIED REGISTERED

## 2017-04-05 PROCEDURE — 12000007 ZZH R&B INTERMEDIATE

## 2017-04-05 PROCEDURE — 25000128 H RX IP 250 OP 636: Performed by: ORTHOPAEDIC SURGERY

## 2017-04-05 PROCEDURE — C1776 JOINT DEVICE (IMPLANTABLE): HCPCS | Performed by: ORTHOPAEDIC SURGERY

## 2017-04-05 PROCEDURE — 25000132 ZZH RX MED GY IP 250 OP 250 PS 637: Performed by: ORTHOPAEDIC SURGERY

## 2017-04-05 PROCEDURE — 85610 PROTHROMBIN TIME: CPT | Performed by: PHYSICIAN ASSISTANT

## 2017-04-05 PROCEDURE — 37000008 ZZH ANESTHESIA TECHNICAL FEE, 1ST 30 MIN: Performed by: ORTHOPAEDIC SURGERY

## 2017-04-05 PROCEDURE — 85025 COMPLETE CBC W/AUTO DIFF WBC: CPT | Performed by: PHYSICIAN ASSISTANT

## 2017-04-05 PROCEDURE — 27110028 ZZH OR GENERAL SUPPLY NON-STERILE: Performed by: ORTHOPAEDIC SURGERY

## 2017-04-05 DEVICE — IMP BASEPLATE TIBIAL HOWM TRI 3 5520-B-300: Type: IMPLANTABLE DEVICE | Site: KNEE | Status: FUNCTIONAL

## 2017-04-05 DEVICE — BONE CEMENT PALACOS 00-1112-140-01: Type: IMPLANTABLE DEVICE | Site: KNEE | Status: FUNCTIONAL

## 2017-04-05 DEVICE — IMP COMP FEM STRK TRIATHLN PS LT 4 5515-F-401: Type: IMPLANTABLE DEVICE | Site: KNEE | Status: FUNCTIONAL

## 2017-04-05 DEVICE — IMP COMP PATELLA HOWM TRI ASYM 35X10MM 5551-G-350: Type: IMPLANTABLE DEVICE | Site: KNEE | Status: FUNCTIONAL

## 2017-04-05 DEVICE — IMP INSERT TIBIAL HOWM TRI SIZE 3 11MM 5532-G-311: Type: IMPLANTABLE DEVICE | Site: KNEE | Status: FUNCTIONAL

## 2017-04-05 RX ORDER — MIRABEGRON 50 MG/1
50 TABLET, EXTENDED RELEASE ORAL DAILY
Status: DISCONTINUED | OUTPATIENT
Start: 2017-04-06 | End: 2017-04-08 | Stop reason: HOSPADM

## 2017-04-05 RX ORDER — ACETAMINOPHEN 325 MG/1
975 TABLET ORAL EVERY 8 HOURS
Status: COMPLETED | OUTPATIENT
Start: 2017-04-05 | End: 2017-04-08

## 2017-04-05 RX ORDER — ONDANSETRON 4 MG/1
4 TABLET, ORALLY DISINTEGRATING ORAL EVERY 6 HOURS PRN
Status: DISCONTINUED | OUTPATIENT
Start: 2017-04-05 | End: 2017-04-08 | Stop reason: HOSPADM

## 2017-04-05 RX ORDER — LIDOCAINE 40 MG/G
CREAM TOPICAL
Status: DISCONTINUED | OUTPATIENT
Start: 2017-04-05 | End: 2017-04-08 | Stop reason: HOSPADM

## 2017-04-05 RX ORDER — NICOTINE 21 MG/24HR
1 PATCH, TRANSDERMAL 24 HOURS TRANSDERMAL DAILY
Status: DISCONTINUED | OUTPATIENT
Start: 2017-04-05 | End: 2017-04-08 | Stop reason: HOSPADM

## 2017-04-05 RX ORDER — CEFAZOLIN SODIUM 2 G/100ML
2 INJECTION, SOLUTION INTRAVENOUS
Status: COMPLETED | OUTPATIENT
Start: 2017-04-05 | End: 2017-04-05

## 2017-04-05 RX ORDER — NALOXONE HYDROCHLORIDE 0.4 MG/ML
.1-.4 INJECTION, SOLUTION INTRAMUSCULAR; INTRAVENOUS; SUBCUTANEOUS
Status: DISCONTINUED | OUTPATIENT
Start: 2017-04-05 | End: 2017-04-08 | Stop reason: HOSPADM

## 2017-04-05 RX ORDER — GABAPENTIN 300 MG/1
300 CAPSULE ORAL 2 TIMES DAILY
Status: COMPLETED | OUTPATIENT
Start: 2017-04-05 | End: 2017-04-08

## 2017-04-05 RX ORDER — BUPIVACAINE HYDROCHLORIDE 7.5 MG/ML
INJECTION, SOLUTION INTRASPINAL PRN
Status: DISCONTINUED | OUTPATIENT
Start: 2017-04-05 | End: 2017-04-05

## 2017-04-05 RX ORDER — PROCHLORPERAZINE MALEATE 5 MG
5-10 TABLET ORAL EVERY 6 HOURS PRN
Status: DISCONTINUED | OUTPATIENT
Start: 2017-04-05 | End: 2017-04-08 | Stop reason: HOSPADM

## 2017-04-05 RX ORDER — FENTANYL CITRATE 50 UG/ML
INJECTION, SOLUTION INTRAMUSCULAR; INTRAVENOUS PRN
Status: DISCONTINUED | OUTPATIENT
Start: 2017-04-05 | End: 2017-04-05

## 2017-04-05 RX ORDER — DIAZEPAM 5 MG
5 TABLET ORAL EVERY 6 HOURS PRN
Status: DISCONTINUED | OUTPATIENT
Start: 2017-04-05 | End: 2017-04-08 | Stop reason: HOSPADM

## 2017-04-05 RX ORDER — OXYCODONE HYDROCHLORIDE 5 MG/1
5-10 TABLET ORAL
Status: DISCONTINUED | OUTPATIENT
Start: 2017-04-05 | End: 2017-04-08 | Stop reason: HOSPADM

## 2017-04-05 RX ORDER — FENTANYL CITRATE 50 UG/ML
25-50 INJECTION, SOLUTION INTRAMUSCULAR; INTRAVENOUS
Status: DISCONTINUED | OUTPATIENT
Start: 2017-04-05 | End: 2017-04-05 | Stop reason: HOSPADM

## 2017-04-05 RX ORDER — SODIUM CHLORIDE, SODIUM LACTATE, POTASSIUM CHLORIDE, CALCIUM CHLORIDE 600; 310; 30; 20 MG/100ML; MG/100ML; MG/100ML; MG/100ML
INJECTION, SOLUTION INTRAVENOUS CONTINUOUS
Status: DISCONTINUED | OUTPATIENT
Start: 2017-04-05 | End: 2017-04-05 | Stop reason: HOSPADM

## 2017-04-05 RX ORDER — CEFAZOLIN SODIUM 2 G/100ML
2 INJECTION, SOLUTION INTRAVENOUS EVERY 8 HOURS
Status: COMPLETED | OUTPATIENT
Start: 2017-04-05 | End: 2017-04-06

## 2017-04-05 RX ORDER — LIDOCAINE HYDROCHLORIDE 10 MG/ML
INJECTION, SOLUTION INFILTRATION; PERINEURAL PRN
Status: DISCONTINUED | OUTPATIENT
Start: 2017-04-05 | End: 2017-04-05

## 2017-04-05 RX ORDER — ONDANSETRON 4 MG/1
4 TABLET, ORALLY DISINTEGRATING ORAL EVERY 30 MIN PRN
Status: DISCONTINUED | OUTPATIENT
Start: 2017-04-05 | End: 2017-04-05 | Stop reason: HOSPADM

## 2017-04-05 RX ORDER — ONDANSETRON 2 MG/ML
4 INJECTION INTRAMUSCULAR; INTRAVENOUS EVERY 30 MIN PRN
Status: DISCONTINUED | OUTPATIENT
Start: 2017-04-05 | End: 2017-04-05 | Stop reason: HOSPADM

## 2017-04-05 RX ORDER — CEFAZOLIN SODIUM 1 G/3ML
1 INJECTION, POWDER, FOR SOLUTION INTRAMUSCULAR; INTRAVENOUS SEE ADMIN INSTRUCTIONS
Status: DISCONTINUED | OUTPATIENT
Start: 2017-04-05 | End: 2017-04-05 | Stop reason: HOSPADM

## 2017-04-05 RX ORDER — ZOLPIDEM TARTRATE 5 MG/1
5 TABLET ORAL
Status: DISCONTINUED | OUTPATIENT
Start: 2017-04-06 | End: 2017-04-08 | Stop reason: HOSPADM

## 2017-04-05 RX ORDER — PROPOFOL 10 MG/ML
INJECTION, EMULSION INTRAVENOUS CONTINUOUS PRN
Status: DISCONTINUED | OUTPATIENT
Start: 2017-04-05 | End: 2017-04-05

## 2017-04-05 RX ORDER — ONDANSETRON 2 MG/ML
4 INJECTION INTRAMUSCULAR; INTRAVENOUS EVERY 6 HOURS PRN
Status: DISCONTINUED | OUTPATIENT
Start: 2017-04-05 | End: 2017-04-08 | Stop reason: HOSPADM

## 2017-04-05 RX ORDER — KETOROLAC TROMETHAMINE 30 MG/ML
30 INJECTION, SOLUTION INTRAMUSCULAR; INTRAVENOUS EVERY 6 HOURS
Status: COMPLETED | OUTPATIENT
Start: 2017-04-05 | End: 2017-04-06

## 2017-04-05 RX ORDER — HYDROMORPHONE HYDROCHLORIDE 1 MG/ML
.3-.5 INJECTION, SOLUTION INTRAMUSCULAR; INTRAVENOUS; SUBCUTANEOUS
Status: DISCONTINUED | OUTPATIENT
Start: 2017-04-05 | End: 2017-04-08 | Stop reason: HOSPADM

## 2017-04-05 RX ORDER — HYDROMORPHONE HYDROCHLORIDE 1 MG/ML
.3-.5 INJECTION, SOLUTION INTRAMUSCULAR; INTRAVENOUS; SUBCUTANEOUS EVERY 5 MIN PRN
Status: DISCONTINUED | OUTPATIENT
Start: 2017-04-05 | End: 2017-04-05 | Stop reason: HOSPADM

## 2017-04-05 RX ORDER — GABAPENTIN 300 MG/1
300 CAPSULE ORAL
Status: COMPLETED | OUTPATIENT
Start: 2017-04-05 | End: 2017-04-05

## 2017-04-05 RX ORDER — NALOXONE HYDROCHLORIDE 0.4 MG/ML
.1-.4 INJECTION, SOLUTION INTRAMUSCULAR; INTRAVENOUS; SUBCUTANEOUS
Status: DISCONTINUED | OUTPATIENT
Start: 2017-04-05 | End: 2017-04-05

## 2017-04-05 RX ORDER — ACETAMINOPHEN 325 MG/1
650 TABLET ORAL EVERY 4 HOURS PRN
Status: DISCONTINUED | OUTPATIENT
Start: 2017-04-08 | End: 2017-04-08 | Stop reason: HOSPADM

## 2017-04-05 RX ORDER — TRAMADOL HYDROCHLORIDE 50 MG/1
50 TABLET ORAL EVERY 6 HOURS PRN
Status: DISCONTINUED | OUTPATIENT
Start: 2017-04-05 | End: 2017-04-08 | Stop reason: HOSPADM

## 2017-04-05 RX ORDER — SODIUM CHLORIDE 9 MG/ML
INJECTION, SOLUTION INTRAVENOUS CONTINUOUS
Status: DISCONTINUED | OUTPATIENT
Start: 2017-04-05 | End: 2017-04-06 | Stop reason: CLARIF

## 2017-04-05 RX ORDER — LIDOCAINE 40 MG/G
CREAM TOPICAL
Status: DISCONTINUED | OUTPATIENT
Start: 2017-04-05 | End: 2017-04-05 | Stop reason: HOSPADM

## 2017-04-05 RX ADMIN — SODIUM CHLORIDE: 9 INJECTION, SOLUTION INTRAVENOUS at 17:23

## 2017-04-05 RX ADMIN — FENTANYL CITRATE 100 MCG: 50 INJECTION, SOLUTION INTRAMUSCULAR; INTRAVENOUS at 12:24

## 2017-04-05 RX ADMIN — MIDAZOLAM HYDROCHLORIDE 2 MG: 1 INJECTION, SOLUTION INTRAMUSCULAR; INTRAVENOUS at 12:19

## 2017-04-05 RX ADMIN — TRANEXAMIC ACID 1 G: 100 INJECTION, SOLUTION INTRAVENOUS at 12:07

## 2017-04-05 RX ADMIN — OXYCODONE HYDROCHLORIDE 5 MG: 5 TABLET ORAL at 23:52

## 2017-04-05 RX ADMIN — GABAPENTIN 300 MG: 300 CAPSULE ORAL at 19:51

## 2017-04-05 RX ADMIN — PROPOFOL 100 MCG/KG/MIN: 10 INJECTION, EMULSION INTRAVENOUS at 12:26

## 2017-04-05 RX ADMIN — MIDAZOLAM HYDROCHLORIDE 3 MG: 1 INJECTION, SOLUTION INTRAMUSCULAR; INTRAVENOUS at 12:16

## 2017-04-05 RX ADMIN — LIDOCAINE HYDROCHLORIDE 4 ML: 10 INJECTION, SOLUTION INFILTRATION; PERINEURAL at 12:21

## 2017-04-05 RX ADMIN — KETOROLAC TROMETHAMINE 30 MG: 30 INJECTION, SOLUTION INTRAMUSCULAR at 16:03

## 2017-04-05 RX ADMIN — OXYCODONE HYDROCHLORIDE 5 MG: 5 TABLET ORAL at 19:52

## 2017-04-05 RX ADMIN — SODIUM CHLORIDE, POTASSIUM CHLORIDE, SODIUM LACTATE AND CALCIUM CHLORIDE: 600; 310; 30; 20 INJECTION, SOLUTION INTRAVENOUS at 13:40

## 2017-04-05 RX ADMIN — SODIUM CHLORIDE, POTASSIUM CHLORIDE, SODIUM LACTATE AND CALCIUM CHLORIDE: 600; 310; 30; 20 INJECTION, SOLUTION INTRAVENOUS at 10:50

## 2017-04-05 RX ADMIN — ACETAMINOPHEN 975 MG: 325 TABLET, FILM COATED ORAL at 23:52

## 2017-04-05 RX ADMIN — BUPIVACAINE HYDROCHLORIDE IN DEXTROSE 1.7 ML: 7.5 INJECTION, SOLUTION SUBARACHNOID at 12:21

## 2017-04-05 RX ADMIN — ACETAMINOPHEN 975 MG: 325 TABLET, FILM COATED ORAL at 17:27

## 2017-04-05 RX ADMIN — LIDOCAINE HYDROCHLORIDE 1 ML: 10 INJECTION, SOLUTION INFILTRATION; PERINEURAL at 10:50

## 2017-04-05 RX ADMIN — CEFAZOLIN SODIUM 2 G: 2 INJECTION, SOLUTION INTRAVENOUS at 12:15

## 2017-04-05 RX ADMIN — GABAPENTIN 300 MG: 300 CAPSULE ORAL at 10:51

## 2017-04-05 RX ADMIN — KETOROLAC TROMETHAMINE 30 MG: 30 INJECTION, SOLUTION INTRAMUSCULAR at 22:17

## 2017-04-05 RX ADMIN — CEFAZOLIN SODIUM 2 G: 2 INJECTION, SOLUTION INTRAVENOUS at 19:52

## 2017-04-05 RX ADMIN — RANITIDINE HYDROCHLORIDE 150 MG: 150 TABLET, FILM COATED ORAL at 19:51

## 2017-04-05 ASSESSMENT — LIFESTYLE VARIABLES: TOBACCO_USE: 1

## 2017-04-05 NOTE — IP AVS SNAPSHOT
MRN:[id number]                      After Visit Summary   4/5/2017    Mirian Cruz    MRN: [id number]           Thank you!     Thank you for choosing [facility] for your care. Our goal is always to provide you with excellent care. Hearing back from our patients is one way we can continue to improve our services. Please take a few minutes to complete the written survey that you may receive in the mail after you visit with us. Thank you!        Patient Information     Date Of Birth          1955        Designated Caregiver       Most Recent Value    Caregiver    Will someone help with your care after discharge? yes    Name of designated caregiver Dov Cruz    Phone number of caregiver [phone]    Caregiver address [id number] [place] [address]      About your hospital stay     You were admitted on:  April 5, 2017 You last received care in the:  [facility]    You were discharged on:  April 8, 2017        Reason for your hospital stay       Status post left total knee arthroplasty                  Who to Call     For medical emergencies, please call 911.  For non-urgent questions about your medical care, please call your primary care provider or clinic, [phone]  For questions related to your surgery, please call your surgery clinic        Attending Provider     Provider Zelalem Valdez MD Orthopedics       Primary Care Provider Office Phone # Fax #    Bianca Peralta [phone]       [facility] [address] [address] [facility] [address]         When to contact your care team       Call your doctor if you have any of the following:  increased shortness of breath, increased drainage, increased swelling or increased pain.                  After Care Instructions     Activity       Your activity upon discharge: activity as tolerated                  Follow-up Appointments     Follow-up and recommended  labs and tests        Follow up with primary care provider, Bianca Peralta, within 7 days for hospital follow- up.  The following labs/tests are recommended: BP.            Follow-up and recommended labs and tests        Follow up with Dr. Mendez in 2 weeks.                  Additional Services     Home Care Referral       ____________________________________________    Your provider has referred you to: [facility]: [facility] [phone]   [email or web address]    Extended Emergency Contact Information  Primary Emergency Contact: [place] [address] [facility]  Home Phone: [phone]  Work Phone: [phone]  Mobile Phone: [phone]  Relation: Daughter    Patient Anticipated Discharge Date: 4/8/17   RN, PT, HHA to begin 24 - 48 hours after discharge.  PLEASE EVALUATE AND TREAT (Evaluation timeline is 24 - 48 hrs. Please call if there is need for a variance to this timeline).    REASON FOR REFERRAL: Assessment & Treatment: PT and RN    ADDITIONAL SERVICES NEEDED: OT    OTHER PERTINENT INFORMATION: Patient was last seen by provider on 4/7/17 for left TKA.    No current outpatient prescriptions on file.    Patient Active Problem List:     Tobacco use disorder     GERD (gastroesophageal reflux disease)     Morbid obesity (H)     Hyperlipidemia LDL goal <130     GBS (Guillain-Vernon syndrome) (H)     Advanced directives, counseling/discussion     Sleep related leg cramps     Urinary, incontinence, stress female     Urethral hypermobility     OA (osteoarthritis) of knee     Status post total right knee replacement     Prediabetes     Status post total left knee replacement     Left knee DJD      Documentation of Face to Face and Certification for Home Health Services    I certify that patient, Mirian Cruz is under my care and that I, or a Nurse Practitioner or Physician's Assistant working with  me, had a face-to-face encounter that meets the physician face-to-face encounter requirements with this patient on: 4/7/2017.    This encounter with the patient was in whole, or in part, for the following medical condition, which is the primary reason for Home Health Care: left TKA.    I certify that, based on my findings, the following services are medically necessary Home Health Services: Nursing, Occupational Therapy and Physical Therapy    My clinical findings support the need for the above services because: Nurse is needed: To assess left TKA after changes in medications or other medical regimen..    Further, I certify that my clinical findings support that this patient is homebound (i.e. absences from home require considerable and taxing effort and are for medical reasons or [employer] services or infrequently or of short duration when for other reasons) because: Requires assistance of another person or specialized equipment to access medical services because patient: Range of motion limitations prevents ability to exit home safely..    Based on the above findings, I certify that this patient is confined to the home and needs intermittent skilled nursing care, physical therapy and/or speech therapy.  The patient is under my care, and I have initiated the establishment of the plan of care.  This patient will be followed by a physician who will periodically review the plan of care.    Physician/Provider to provide follow up care: Bianca Peralta    [facility] certified Physician at time of discharge: Zelalem Mendez MD    Please be aware that coverage of these services is subject to the terms and limitations of your health insurance plan.  Call member services at your health plan with any benefit or coverage questions.                  Further instructions from your care team       Orthopedic Instructions:  1.  Follow up with Sammy Michael PA-C.  in 2 weeks for post op check and x rays as scheduled.  Call  "[phone] if appointment needed or questions  2.  Use pain medication as directed  3.  Keep incision clean, covered and dry until post op appointment.  You may shower and get incision wet if no drainage is present  4.  Continue physical therapy as soon as possible.  You will need to call a therapy department of your choice to arrange future appointments.  Your order for physical therapy is included in your discharge paperwork.   5.  Take Aspirin 325mg  daily  for 42 days for anticoagulation    Hospitalist Instructions:  Please follow up with your PCP within 7-10 days of discharge to discuss your blood pressure.  It was a bit elevated while you were admitted; this may have been a result of stress/pain, but may also indicate that some of your blood pressure medications require adjustment.          Pending Results     No orders found from 4/3/2017 to 4/6/2017.            Statement of Approval     Ordered          04/08/17 1132  I have reviewed and agree with all the recommendations and orders detailed in this document.  EFFECTIVE NOW     Approved and electronically signed by:  Evangelista Kline MD             Admission Information     Date & Time Provider Department Dept. Phone    4/5/2017 Zelalem Mendez MD [facility] [phone]      Your Vitals Were     Blood Pressure Pulse Temperature Respirations Height Weight    119/88 (BP Location: Right arm) 83 98.1  F (36.7  C) (Oral) 16 1.626 m (5' 4\") 106.6 kg (235 lb)    Pulse Oximetry BMI (Body Mass Index)                97% 40.34 kg/m2          MyChart Information     [employer] lets you send messages to your doctor, view your test results, renew your prescriptions, schedule appointments and more. To sign up, go to [email or web address] . Click on \"Log in\" on the left side of the screen, which will take you to the Welcome page. Then click on \"Sign up Now\" on the right side of the page.     You will be asked to enter the access code listed " below, as well as some personal information. Please follow the directions to create your username and password.     Your access code is: [email or web address]  Expires: 2017 10:12 AM     Your access code will  in 90 days. If you need help or a new code, please call your [facility] clinic or [phone].        Care EveryWhere ID     This is your Care EveryWhere ID. This could be used by other organizations to access your [facility] medical records  [id number]           Review of your medicines      START taking        Dose / Directions    acetaminophen 325 MG tablet   Commonly known as:  TYLENOL        Dose:  975 mg   Take 3 tablets (975 mg) by mouth every 8 hours   Quantity:  100 tablet   Refills:  0       aspirin 325 MG EC tablet   Notes to Patient:  Start today 17        Dose:  325 mg   Take 1 tablet (325 mg) by mouth daily with food   Quantity:  42 tablet   Refills:  0       oxyCODONE 5 MG IR tablet   Commonly known as:  ROXICODONE        Dose:  5-10 mg   Take 1-2 tablets (5-10 mg) by mouth every 3 hours as needed for moderate to severe pain   Quantity:  40 tablet   Refills:  0       senna-docusate 8.6-50 MG per tablet   Commonly known as:  SENOKOT-S;PERICOLACE        Dose:  1 tablet   Take 1 tablet by mouth At Bedtime   Quantity:  100 tablet   Refills:  0         CONTINUE these medicines which may have CHANGED, or have new prescriptions. If we are uncertain of the size of tablets/capsules you have at home, strength may be listed as something that might have changed.        Dose / Directions    traMADol 50 MG tablet   Commonly known as:  ULTRAM   This may have changed:    - how much to take  - how to take this  - when to take this  - reasons to take this        Dose:  25-50 mg   Take 0.5-1 tablets (25-50 mg) by mouth every 6 hours as needed for moderate pain   Quantity:  40 tablet   Refills:  0         CONTINUE these medicines which have NOT CHANGED        Dose / Directions    ascorbic acid 500 MG tablet    Commonly known as:  VITAMIN C   Notes to Patient:  Resume          Dose:  500 mg   Take 1 tablet by mouth daily.   Quantity:  100 tablet   Refills:  12       calcium 600 MG tablet   Notes to Patient:  Resume          1 daily   Refills:  0       cholecalciferol 1000 UNIT tablet   Commonly known as:  vitamin D   Notes to Patient:  Resume          Dose:  1 tablet   Take 1 tablet by mouth daily.   Refills:  0       fish oil-omega-3 fatty acids 1000 MG capsule   Notes to Patient:  Resume          1 daily   Refills:  0       IRON SUPPLEMENT PO        Dose:  325 mg   Take 325 mg by mouth every other day   Refills:  0       mirabegron 50 MG 24 hr tablet   Commonly known as:  MYRBETRIQ   Used for:  Bladder infection        Dose:  50 mg   Take 1 tablet (50 mg) by mouth daily   Quantity:  90 tablet   Refills:  1       naproxen 500 MG tablet   Commonly known as:  NAPROSYN   Used for:  Primary osteoarthritis of left knee        Dose:  500 mg   Take 1 tablet (500 mg) by mouth 2 times daily (with meals) As needed for knee or hip pain   Quantity:  60 tablet   Refills:  5       omeprazole 40 MG capsule   Commonly known as:  priLOSEC   Used for:  Gastroesophageal reflux disease without esophagitis        Dose:  40 mg   Take 1 capsule (40 mg) by mouth daily Take 30-60 minutes before a meal.   Quantity:  90 capsule   Refills:  3       vitamin E 400 UNIT capsule   Notes to Patient:  Resume          Dose:  1 capsule   Take 1 capsule by mouth daily.   Quantity:  100 capsule   Refills:  12            Where to get your medicines      These medications were sent to [facility] - [address]  [address], [place] [address]    Hours:  [facility] Phone:  [phone]     acetaminophen 325 MG tablet    aspirin 325 MG EC tablet    senna-docusate 8.6-50 MG per tablet         Some of these will need a paper prescription and others can be bought over the counter. Ask your nurse if  you have questions.     Bring a paper prescription for each of these medications     oxyCODONE 5 MG IR tablet    traMADol 50 MG tablet                Protect others around you: Learn how to safely use, store and throw away your medicines at www.disposemymeds.org.             Medication List: This is a list of all your medications and when to take them. Check marks below indicate your daily home schedule. Keep this list as a reference.      Medications           Morning Afternoon Evening Bedtime As Needed    acetaminophen 325 MG tablet   Commonly known as:  TYLENOL   Take 3 tablets (975 mg) by mouth every 8 hours   Last time this was given:  975 mg on 4/8/2017  8:41 AM   Next Dose Due:  4/8/17                                   ascorbic acid 500 MG tablet   Commonly known as:  VITAMIN C   Take 1 tablet by mouth daily.   Notes to Patient:  Resume                                  aspirin 325 MG EC tablet   Take 1 tablet (325 mg) by mouth daily with food   Notes to Patient:  Start today 4/8/17                                calcium 600 MG tablet   1 daily   Notes to Patient:  Resume                                  cholecalciferol 1000 UNIT tablet   Commonly known as:  vitamin D   Take 1 tablet by mouth daily.   Notes to Patient:  Resume                                  fish oil-omega-3 fatty acids 1000 MG capsule   1 daily   Notes to Patient:  Resume                                  IRON SUPPLEMENT PO   Take 325 mg by mouth every other day   Last time this was given:  325 mg on 4/7/2017  7:53 AM   Next Dose Due:  4/9/17                                   mirabegron 50 MG 24 hr tablet   Commonly known as:  MYRBETRIQ   Take 1 tablet (50 mg) by mouth daily   Last time this was given:  50 mg on 4/8/2017  8:45 AM   Next Dose Due:  4/9/17                                   naproxen 500 MG tablet   Commonly known as:  NAPROSYN   Take 1 tablet (500 mg) by mouth 2 times daily (with meals) As needed for knee or hip pain                                    omeprazole 40 MG capsule   Commonly known as:  priLOSEC   Take 1 capsule (40 mg) by mouth daily Take 30-60 minutes before a meal.   Last time this was given:  40 mg on 4/8/2017  8:41 AM   Next Dose Due:  4/9/17                                   oxyCODONE 5 MG IR tablet   Commonly known as:  ROXICODONE   Take 1-2 tablets (5-10 mg) by mouth every 3 hours as needed for moderate to severe pain   Last time this was given:  10 mg on 4/8/2017  8:41 AM                                   senna-docusate 8.6-50 MG per tablet   Commonly known as:  SENOKOT-S;PERICOLACE   Take 1 tablet by mouth At Bedtime   Last time this was given:  1 tablet on 4/7/2017  7:29 PM   Next Dose Due:  4/8/17                                   traMADol 50 MG tablet   Commonly known as:  ULTRAM   Take 0.5-1 tablets (25-50 mg) by mouth every 6 hours as needed for moderate pain   Last time this was given:  50 mg on 4/8/2017 11:26 AM                                   vitamin E 400 UNIT capsule   Take 1 capsule by mouth daily.   Notes to Patient:  Resume

## 2017-04-05 NOTE — OP NOTE
Total Knee Arthroplasty Operative Note        PLAN:  Weight bearing status: Weight bearing as tolerated   Activity: Activity as tolerated  Patient may move about with assist as indicated or with supervision   Anticoagulation plan:                 Lovenox inpatient and then  mg daily at discharge  for 42 days  Follow up plan                           Follow up in 2 week(s)        Name: Mirian Cruz    PCP: Bianca Peralta    Procedure Date: 4/5/2017    Pre-operative diagnosis: degenerative joint disease   Post-operative diagnosis: Same   Procedure: Total knee arthoplasty (Left)   Surgeon: Zelalem Mendez MD     Assistant(s): Sammy Michael PA-C   Anesthesia: Spinal   Estimated blood loss: Less than 50 ml   Drains: Hemovac   Specimens: None       Findings: See full dictated operative note for details   Complications: None       Comments: See dictated operative report for full details         Procedure and Findings:    After being informed of risks, benefits, alternatives to the procedure, patient desired to proceed, brought to the operating suite where they were placed under spinal anesthetic. Patient received 2 grams of intravenous Ancef.  Sammy Michael PA-C was present for the entire length of the case for the purposes of proper patient positioning, surgical exposure, and patient safety. A time-out verification step was completed.    Attention was directed towards the patella. A midline incision, vastus splitting minimally invasive approach was utilized. The patella was everted. It was measured at 35 mm. It was resected, remeasured and a 35 mm asymmetric patella was positioned. A protector plate was placed on the patella. Attention was directed toward the distal femur. IM guider consuelo was placed. An 8 mm 5 degree distal femoral cut was completed. The IM guide consuelo was then placed in the tibia. External guide cnosuelo and tower were utilized and 9 mm button stylus was referenced off the lateral tibial plateau and  cuts were completed. The tibia was sized to a 3. Attention was directed towards the distal femur. It was sized to a 4. The 4-in-1 cutting block was placed along the epicondylar axis. It was secured with 2 pins, anterior, posterior and chamfer cuts were completed. Soft tissue balancing was then carried out. Medial release was completed. Ultimately a 11 mm insert gave excellent range of motion and stability throughout the range of motion. Patella was noted to track symmetrically throughout the range to motion. The tibial tray rotation was marked, size was verified, it was secured with 2 pins and punched. Trial components were then removed. The cancellous surfaces were pulsatile lavaged. One batch of Palacos cement was mixed under vacuum. The tibia, femur and patella were sequentially cemented in place. The knee was held in extension with axial compression until the cement had hardened. The 11 mmPS insert was ultimately selected and secured Care was taken to remove any excess cement. Joint capsular injection with anesthetic solution was performed. Excellent range of motion and stability was demonstrated. A Hemovac drain was placed. The joint was copiously irrigated. Joint capsules were closed with interrupted number 1 running Stratafix. Subcutaneous tissues were closed with 2-0 Vicryl and skin was closed with 3-0 running Stratifix and Prineo wound closure. A sterile dressing was applied. Patient tolerated the procedure well without complication and returned to the PAR in stable condition.      Zelalem Mendez    Date: 4/5/2017 Time: 1:51 PM  ?    CONFIDENTIALITY NOTICE This message and any included attachments are from [facility] and are intended only for the addressee. The information contained in this message is confidential and may constitute inside or non-public information under international, federal, or state securities laws. Unauthorized forwarding, printing, copying, distribution, or use of such  information is strictly prohibited and may be unlawful. If you are not the addressee, please promptly delete this message and notify the sender of the delivery error by e-mail.

## 2017-04-05 NOTE — IP AVS SNAPSHOT
"    [facility] SURGICAL: [phone]                                              INTERAGENCY TRANSFER FORM - PHYSICIAN ORDERS   2017                    Hospital Admission Date: 2017  [place]   : 1955  Sex: Female        Attending Provider: (none)    Allergies:  Septra [Sulfamethoxazole W/trimethoprim], Influenza Vaccine Live    Infection:  None   Service:  SURGERY    Ht:  5' 4\" (1.626 m)   Wt:  235 lb (106.6 kg)   Admission Wt:  235 lb (106.6 kg)    BMI:  40.34 kg/m 2   BSA:  2.19 m 2            Patient PCP Information     Provider PCP Type    Bianca Peralta MD General      ED Clinical Impression     Diagnosis Description Comment Added By Time Added    Status post total left knee replacement [Z96.652] Status post total left knee replacement [Z96.652]  Renu Gaitan, RN 2017 12:01 PM      Hospital Problems as of 2017              Priority Class Noted POA    Tobacco use disorder   2008 Yes    GERD (gastroesophageal reflux disease)   10/27/2009 Yes    Hyperlipidemia LDL goal <130   10/31/2010 Yes    Prediabetes Medium  2016 Yes    * (Principal)Status post total left knee replacement Medium  2017 Yes    Left knee DJD Medium  2017 Yes      Non-Hospital Problems as of 2017              Priority Class Noted    Morbid obesity (H)   10/27/2009    GBS (Guillain-[place] syndrome) (H)   10/8/2011    Advanced directives, counseling/discussion   1/10/2013    Sleep related leg cramps   1/10/2013    Urinary, incontinence, stress female   2013    Urethral hypermobility   2013    OA (osteoarthritis) of knee   2/10/2015    Status post total right knee replacement Medium  2015      Code Status History     Date Active Date Inactive Code Status Order ID Comments User Context    2017  3:40 PM 2017  2:28 PM Full Code [id number]  Zelalem Mendez MD Inpatient    2015  4:32 PM 2015  7:51 PM Full Code [id number]  Zelalem Mendez MD " Inpatient    10/9/2011 12:25 PM 10/18/2011  6:22 PM Full Code [id number]  Donavon Harris Inpatient    10/8/2011  5:38 PM 10/9/2011 12:25 PM Full Code [id number]  [facility]         Medication Review      START taking        Dose / Directions Comments    acetaminophen 325 MG tablet   Commonly known as:  TYLENOL   Used for:  Status post total left knee replacement        Dose:  975 mg   Take 3 tablets (975 mg) by mouth every 8 hours   Quantity:  100 tablet   Refills:  0        aspirin 325 MG EC tablet   Used for:  Status post total left knee replacement   Notes to Patient:  Start today 4/8/17        Dose:  325 mg   Take 1 tablet (325 mg) by mouth daily with food   Quantity:  42 tablet   Refills:  0        oxyCODONE 5 MG IR tablet   Commonly known as:  ROXICODONE   Used for:  Status post total left knee replacement        Dose:  5-10 mg   Take 1-2 tablets (5-10 mg) by mouth every 3 hours as needed for moderate to severe pain   Quantity:  40 tablet   Refills:  0        senna-docusate 8.6-50 MG per tablet   Commonly known as:  SENOKOT-S;PERICOLACE   Used for:  Status post total left knee replacement        Dose:  1 tablet   Take 1 tablet by mouth At Bedtime   Quantity:  100 tablet   Refills:  0          CONTINUE these medications which may have CHANGED, or have new prescriptions. If we are uncertain of the size of tablets/capsules you have at home, strength may be listed as something that might have changed.        Dose / Directions Comments    traMADol 50 MG tablet   Commonly known as:  ULTRAM   This may have changed:    - how much to take  - how to take this  - when to take this  - reasons to take this   Used for:  Status post total left knee replacement        Dose:  25-50 mg   Take 0.5-1 tablets (25-50 mg) by mouth every 6 hours as needed for moderate pain   Quantity:  40 tablet   Refills:  0          CONTINUE these medications which have NOT CHANGED        Dose / Directions Comments    ascorbic acid 500  MG tablet   Commonly known as:  VITAMIN C   Notes to Patient:  Resume          Dose:  500 mg   Take 1 tablet by mouth daily.   Quantity:  100 tablet   Refills:  12        calcium 600 MG tablet   Notes to Patient:  Resume          1 daily   Refills:  0        cholecalciferol 1000 UNIT tablet   Commonly known as:  vitamin D   Notes to Patient:  Resume          Dose:  1 tablet   Take 1 tablet by mouth daily.   Refills:  0        fish oil-omega-3 fatty acids 1000 MG capsule   Notes to Patient:  Resume          1 daily   Refills:  0        IRON SUPPLEMENT PO        Dose:  325 mg   Take 325 mg by mouth every other day   Refills:  0        mirabegron 50 MG 24 hr tablet   Commonly known as:  MYRBETRIQ   Used for:  Bladder infection        Dose:  50 mg   Take 1 tablet (50 mg) by mouth daily   Quantity:  90 tablet   Refills:  1        naproxen 500 MG tablet   Commonly known as:  NAPROSYN   Used for:  Primary osteoarthritis of left knee        Dose:  500 mg   Take 1 tablet (500 mg) by mouth 2 times daily (with meals) As needed for knee or hip pain   Quantity:  60 tablet   Refills:  5        omeprazole 40 MG capsule   Commonly known as:  priLOSEC   Used for:  Gastroesophageal reflux disease without esophagitis        Dose:  40 mg   Take 1 capsule (40 mg) by mouth daily Take 30-60 minutes before a meal.   Quantity:  90 capsule   Refills:  3        vitamin E 400 UNIT capsule   Notes to Patient:  Resume          Dose:  1 capsule   Take 1 capsule by mouth daily.   Quantity:  100 capsule   Refills:  12                  Further instructions from your care team       Orthopedic Instructions:  1.  Follow up with Sammy Michael PA-C.  in 2 weeks for post op check and x rays as scheduled.  Call [phone] if appointment needed or questions  2.  Use pain medication as directed  3.  Keep incision clean, covered and dry until post op appointment.  You may shower and get incision wet if no drainage is present  4.  Continue physical therapy as  soon as possible.  You will need to call a therapy department of your choice to arrange future appointments.  Your order for physical therapy is included in your discharge paperwork.   5.  Take Aspirin 325mg  daily  for 42 days for anticoagulation    Hospitalist Instructions:  Please follow up with your PCP within 7-10 days of discharge to discuss your blood pressure.  It was a bit elevated while you were admitted; this may have been a result of stress/pain, but may also indicate that some of your blood pressure medications require adjustment.          Summary of Visit     Reason for your hospital stay       Status post left total knee arthroplasty             After Care     Activity       Your activity upon discharge: activity as tolerated             Referrals     Home Care Referral       ____________________________________________    Your provider has referred you to: [facility]: [facility] [phone]   [email or web address]    Extended Emergency Contact Information  Primary Emergency Contact: [place] [address] [facility]  Home Phone: [phone]  Work Phone: [phone]  Mobile Phone: [phone]  Relation: Daughter    Patient Anticipated Discharge Date: 4/8/17   RN, PT, HHA to begin 24 - 48 hours after discharge.  PLEASE EVALUATE AND TREAT (Evaluation timeline is 24 - 48 hrs. Please call if there is need for a variance to this timeline).    REASON FOR REFERRAL: Assessment & Treatment: PT and RN    ADDITIONAL SERVICES NEEDED: OT    OTHER PERTINENT INFORMATION: Patient was last seen by provider on 4/7/17 for left TKA.    No current outpatient prescriptions on file.    Patient Active Problem List:     Tobacco use disorder     GERD (gastroesophageal reflux disease)     Morbid obesity (H)     Hyperlipidemia LDL goal <130     GBS (Guillain-[place] syndrome) (H)     Advanced directives,  counseling/discussion     Sleep related leg cramps     Urinary, incontinence, stress female     Urethral hypermobility     OA (osteoarthritis) of knee     Status post total right knee replacement     Prediabetes     Status post total left knee replacement     Left knee DJD      Documentation of Face to Face and Certification for Home Health Services    I certify that patient, Mirian Cruz is under my care and that I, or a Nurse Practitioner or Physician's Assistant working with me, had a face-to-face encounter that meets the physician face-to-face encounter requirements with this patient on: 4/7/2017.    This encounter with the patient was in whole, or in part, for the following medical condition, which is the primary reason for Home Health Care: left TKA.    I certify that, based on my findings, the following services are medically necessary Home Health Services: Nursing, Occupational Therapy and Physical Therapy    My clinical findings support the need for the above services because: Nurse is needed: To assess left TKA after changes in medications or other medical regimen..    Further, I certify that my clinical findings support that this patient is homebound (i.e. absences from home require considerable and taxing effort and are for medical reasons or [employer] services or infrequently or of short duration when for other reasons) because: Requires assistance of another person or specialized equipment to access medical services because patient: Range of motion limitations prevents ability to exit home safely..    Based on the above findings, I certify that this patient is confined to the home and needs intermittent skilled nursing care, physical therapy and/or speech therapy.  The patient is under my care, and I have initiated the establishment of the plan of care.  This patient will be followed by a physician who will periodically review the plan of care.    Physician/Provider to provide follow up care:  Bianca Peralta    [facility] certified Physician at time of discharge: Zelalem Mendez MD    Please be aware that coverage of these services is subject to the terms and limitations of your health insurance plan.  Call member services at your health plan with any benefit or coverage questions.             Follow-Up Appointment Instructions     Future Labs/Procedures    Follow-up and recommended labs and tests      Comments:    Follow up with primary care provider, Binaca Peralta, within 7 days for hospital follow- up.  The following labs/tests are recommended: BP.    Follow-up and recommended labs and tests      Comments:    Follow up with Dr. Mendez in 2 weeks.      Follow-Up Appointment Instructions     Follow-up and recommended labs and tests        Follow up with primary care provider, Bianca Peralta, within 7 days for hospital follow- up.  The following labs/tests are recommended: BP.       Follow-up and recommended labs and tests        Follow up with Dr. Mendez in 2 weeks.             Statement of Approval     Ordered          04/08/17 1132  I have reviewed and agree with all the recommendations and orders detailed in this document.  EFFECTIVE NOW     Approved and electronically signed by:  Evangelista Kline MD

## 2017-04-05 NOTE — ANESTHESIA PROCEDURE NOTES
Peripheral nerve/Neuraxial procedure note : intrathecal  Pre-Procedure  Performed by  JAIME BELTRÁN   Location: OR      Pre-Anesthestic Checklist: patient identified, IV checked, risks and benefits discussed, informed consent, monitors and equipment checked and pre-op evaluation    Timeout  Correct Patient: Yes   Correct Procedure: Yes   Correct Site: Yes   Correct Laterality: N/A   Correct Position: Yes   Site Marked: N/A   .   Procedure Documentation  ASA 2  .    Procedure:    Intrathecal.  Insertion Site:L4-5  (midline approach)      Patient Prep;mask, sterile gloves, povidone-iodine 7.5% surgical scrub, patient draped.  .  Needle: (). # of attempts: 2. # of redirects: 2.  Spinal Needle: Hudson tip 22 G. 3.5 in.  No introducer used. . .     Assessment/Narrative  Paresthesias: No.  .  .  clear CSF fluid removed . Sensory Level: T6 Comments:  Increased soft tissue, some positioning difficulty due to body habitus.

## 2017-04-05 NOTE — ANESTHESIA PREPROCEDURE EVALUATION
Anesthesia Evaluation     .             ROS/MED HX    ENT/Pulmonary:     (+)tobacco use, .25 packs/day  , . .    Neurologic: Comment: Hx GBS      Cardiovascular:     (+) Dyslipidemia, ----. : . . . :. . Previous cardiac testing date:results:date: results:ECG reviewed date:2011 results:NSR date: results:          METS/Exercise Tolerance:  >4 METS   Hematologic:  - neg hematologic  ROS       Musculoskeletal:   (+) arthritis, , , -       GI/Hepatic:     (+) GERD       Renal/Genitourinary:  - ROS Renal section negative       Endo: Comment: prediabetes    (+) Obesity, .      Psychiatric:  - neg psychiatric ROS       Infectious Disease:  - neg infectious disease ROS       Malignancy:      - no malignancy   Other:    - neg other ROS                 Physical Exam  Normal systems: cardiovascular, pulmonary and dental    Airway   Mallampati: II  TM distance: >3 FB  Neck ROM: full  Comment: obese    Dental     Cardiovascular   Rhythm and rate: regular and normal      Pulmonary    breath sounds clear to auscultation                    Anesthesia Plan      History & Physical Review  History and physical reviewed and following examination; no interval change.    ASA Status:  2 .    NPO Status:  > 8 hours    Plan for Spinal     Pt. Prefers SAB      Postoperative Care  Postoperative pain management:  IV analgesics and Oral pain medications.      Consents  Anesthetic plan, risks, benefits and alternatives discussed with:  Patient..                          .

## 2017-04-05 NOTE — INTERVAL H&P NOTE
This H&P has been reviewed and there are no clinically significant changes in the patient s condition.  The Patient is approved for surgery.

## 2017-04-05 NOTE — PLAN OF CARE
"Problem: Knee Replacement, Total (Adult)  Goal: Signs and Symptoms of Listed Potential Problems Will be Absent or Manageable (Knee Replacement, Total)  Signs and symptoms of listed potential problems will be absent or manageable by discharge/transition of care (reference Knee Replacement, Total (Adult) CPG).  [facility] ADMISSION NOTE     Patient admitted to room 2303 at approximately 1545 via cart from surgery. Patient was accompanied by daughter and father.      Verbal SBAR report received from PACU nurse prior to patient arrival.      Patient trasferred to bed via air [place]ford. Patient alert and oriented X 4. The patient is not having any pain.  . Admission vital signs: Blood pressure 132/65, pulse 66, temperature 97.8  F (36.6  C), temperature source Oral, resp. rate 18, height 1.626 m (5' 4\"), weight 106.6 kg (235 lb), SpO2 98 %, not currently breastfeeding. Patient was oriented to plan of care, call light, bed controls, tv, telephone, bathroom and visiting hours.      The following safety risks were identified during admission: fall. Yellow risk band applied: YES.      Petra Perea          "

## 2017-04-05 NOTE — ANESTHESIA CARE TRANSFER NOTE
Patient: Mirian Cruz    Procedure(s):  Left Total Knee Arthroplasty - Wound Class: I-Clean    Diagnosis: degenerative joint disease  Diagnosis Additional Information: No value filed.    Anesthesia Type:   Spinal     Note:  Airway :Nasal Cannula  Patient transferred to:PACU        Vitals: (Last set prior to Anesthesia Care Transfer)    CRNA VITALS  4/5/2017 1338 - 4/5/2017 1411      4/5/2017             Pulse: 80    SpO2: 97 %                Electronically Signed By: Brandon Alegria CRNA, APRN CRNA  April 5, 2017  2:11 PM

## 2017-04-05 NOTE — H&P (VIEW-ONLY)
[facility]  [address]  [facility] [address]  [phone]  Dept: [phone]    PRE-OP EVALUATION:  Today's date: 3/13/2017    Mirian Cruz (: 1955) presents for pre-operative evaluation assessment as requested by Dr. Mendez.  She requires evaluation and anesthesia risk assessment prior to undergoing surgery/procedure for treatment of Knee Left .  Proposed procedure: Total Left Knee Arthroplasty    Date of Surgery/ Procedure: 2017  Time of Surgery/ Procedure: 12:00am   Hospital/Surgical Facility: [place]  Primary Physician: Bianca Peralta  Type of Anesthesia Anticipated: to be determined    Patient has a Health Care Directive or Living Will:  NO    1. NO - Do you have a history of heart attack, stroke, stent, bypass or surgery on an artery in the head, neck, heart or legs?  2. NO - Do you ever have any pain or discomfort in your chest?  3. NO - Do you have a history of  Heart Failure?  4. NO - Are you troubled by shortness of breath when: walking on the level, up a slight hill or at night?  5. NO - Do you currently have a cold, bronchitis or other respiratory infection?  6. NO - Do you have a cough, shortness of breath or wheezing?  7. NO - Do you sometimes get pains in the calves of your legs when you walk?  8. NO - Do you or anyone in your family have previous history of blood clots?  9. NO - Do you or does anyone in your family have a serious bleeding problem such as prolonged bleeding following surgeries or cuts?  10. NO - Have you ever had problems with anemia or been told to take iron pills?  11. NO - Have you had any abnormal blood loss such as black, tarry or bloody stools, or abnormal vaginal bleeding?  12. NO - Have you ever had a blood transfusion?  13. NO - Have you or any of your relatives ever had problems with anesthesia?  14. NO - Do you have sleep apnea, excessive snoring or daytime drowsiness?  15. NO - DO YOU HAVE ANY PROSTHETIC HEART  VALVES?  15. NO - Do you have any prosthetic heart valves?  16. YES - DO YOU HAVE PROSTHETIC JOINTS? Right knee surgery   17. NO - Is there any chance that you may be pregnant?      HPI:                                                      Brief HPI related to upcoming procedure: Mirian Cruz is a 61 year old female with end stage arthritis of her left knee, now scheduled for left knee replacement.    See problem list for active medical problems.  Problems all longstanding and stable, except as noted/documented.  See ROS for pertinent symptoms related to these conditions.                                                                                                  .    MEDICAL HISTORY:                                                      Patient Active Problem List    Diagnosis Date Noted     Prediabetes 04/13/2016     Priority: Medium     Status post total right knee replacement 02/18/2015     Priority: Medium     OA (osteoarthritis) of knee 02/10/2015     Priority: Medium     Urinary, incontinence, stress female 05/07/2013     Priority: Medium     Urethral hypermobility 05/07/2013     Priority: Medium     Advanced directives, counseling/discussion 01/10/2013     Priority: Medium     Patient does not have an Advance/Health Care Directive (HCD), declines information/referral.    Mai Muse  January 10, 2013         Sleep related leg cramps 01/10/2013     Priority: Medium     Improve with gabapentin       GBS (Guillain-[place] syndrome) (H) 10/08/2011     Priority: Medium     Hospitalized 10/1/2011 - 10/8/2011  In acute rehab 10/9/2011 - 10/18/2011       Hyperlipidemia LDL goal <130 10/31/2010     Priority: Medium     GERD (gastroesophageal reflux disease) 10/27/2009     Priority: Medium     Morbid obesity (H) 10/27/2009     Priority: Medium     Tobacco use disorder 08/20/2008     Priority: Medium     Very rare use of cigarette, does use some e-cigarettes.  4/24/16 -- patient stopped all nicotine a few  weeks ago        No past medical history on file.  Past Surgical History   Procedure Laterality Date     Tubal ligation       Bunionectomy chevron and jenni, combined  3/25/2011     COMBINED BUNIONECTOMY CHEVRON AND JENNI performed by TRISTON SEWELL at [facility]     Remove hardware foot  2/15/2012     Procedure:REMOVE HARDWARE FOOT; Removal of hardware left foot; Surgeon:TRISTON SEWELL; Location:[facility]     Sling transvaginal  5/20/2013     Procedure: SLING TRANSVAGINAL;  Transvaginal taping, cystoscopy;  Surgeon: Adan Morrison MD;  Location: [facility]     Cystoscopy  5/20/2013     Procedure: CYSTOSCOPY;;  Surgeon: Adan Morrison MD;  Location: [facility]     Arthroplasty knee Right 2/18/2015     Procedure: ARTHROPLASTY KNEE;  Surgeon: Zelalem Mendez MD;  Location: [facility]     Current Outpatient Prescriptions   Medication Sig Dispense Refill     naproxen (NAPROSYN) 500 MG tablet Take 1 tablet (500 mg) by mouth 2 times daily (with meals) As needed for knee or hip pain 60 tablet 5     mirabegron (MYRBETRIQ) 50 MG 24 hr tablet Take 1 tablet (50 mg) by mouth daily 90 tablet 1     omeprazole (PRILOSEC) 40 MG capsule Take 1 capsule (40 mg) by mouth daily Take 30-60 minutes before a meal. 90 capsule 3     cholecalciferol (VITAMIN D) 1000 UNIT tablet Take 1 tablet by mouth daily.       ascorbic acid (VITAMIN C) 500 MG tablet Take 1 tablet by mouth daily. 100 tablet 12     vitamin E 400 UNIT capsule Take 1 capsule by mouth daily. 100 capsule 12     CALCIUM 600 MG OR TABS 1 daily       FISH OIL 1000 MG OR CAPS 1 daily       traMADol (ULTRAM) 50 MG tablet        OTC products: None, except as noted above    Allergies   Allergen Reactions     Septra [Sulfamethoxazole W/Trimethoprim] Other (See Comments)     Stomatitis with mouth ulcerations     Influenza Vaccine Live      History of Guillain-[place]      Latex Allergy: NO    Social History   Substance Use Topics     Smoking status: Former Smoker     Types:  Cigarettes     Quit date: 7/18/2015     Smokeless tobacco: Never Used     Alcohol use 0.0 oz/week     0 Standard drinks or equivalent per week      Comment: occ     History   Drug Use No       REVIEW OF SYSTEMS:                                                    C: NEGATIVE for fever, chills, change in weight  I: NEGATIVE for worrisome rashes, moles or lesions  E: NEGATIVE for vision changes or irritation  E/M: NEGATIVE for ear, mouth and throat problems  R: NEGATIVE for significant cough or SOB  CV: NEGATIVE for chest pain, palpitations or peripheral edema  GI: NEGATIVE for nausea, abdominal pain, heartburn, or change in bowel habits   female: history of recurrent UTI, now with symptoms again --UA/UC pending  MUSCULOSKELETAL:other than knee pain, she does note some numbness/pain in her hands which she thinks is carpal tunnel -- she is just starting the use of wrist braces and will follow up on this at a later date  N: NEGATIVE for weakness, dizziness or paresthesias  E: NEGATIVE for temperature intolerance, skin/hair changes  H: NEGATIVE for bleeding problems  P: NEGATIVE for changes in mood or affect    EXAM:                                                    /80  Pulse 83  Wt 234 lb 14.4 oz (106.5 kg)  BMI 40.32 kg/m2    GENERAL APPEARANCE: healthy, alert, active and obese     EYES: EOMI, PERRL     HENT: ear canals and TM's normal and nose and mouth without ulcers or lesions     NECK: no adenopathy, no asymmetry, masses, or scars and thyroid normal to palpation     RESP: lungs clear to auscultation - no rales, rhonchi or wheezes     CV: regular rates and rhythm, normal S1 S2, no S3 or S4 and peripheral pulses strong     ABDOMEN: soft, nontender, without hepatosplenomegaly or masses     MS: peripheral pulses normal and limping due to left knee pain     NEURO: Normal strength and tone, sensory exam grossly normal, mentation intact and speech normal     PSYCH: mentation appears normal. and affect  normal/bright     LYMPHATICS: No axillary, cervical, or supraclavicular nodes    DIAGNOSTICS:                                                      Results for orders placed or performed in visit on 03/13/17   *UA reflex to Microscopic and Culture ([facility] (except [place] and [place])   Result Value Ref Range    Color Urine Yellow     Appearance Urine Clear     Glucose Urine Negative NEG mg/dL    Bilirubin Urine Negative NEG    Ketones Urine Negative NEG mg/dL    Specific Gravity Urine >1.030 1.003 - 1.035    Blood Urine Moderate (A) NEG    pH Urine 5.0 5.0 - 7.0 pH    Protein Albumin Urine 100 (A) NEG mg/dL    Urobilinogen Urine 1.0 0.2 - 1.0 EU/dL    Nitrite Urine Positive (A) NEG    Leukocyte Esterase Urine Small (A) NEG    Source Midstream Urine    Urine Microscopic   Result Value Ref Range    WBC Urine 10-25 (A) 0 - 2 /HPF    RBC Urine 5-10 (A) 0 - 2 /HPF    Squamous Epithelial /LPF Urine Few FEW /LPF    Bacteria Urine Few (A) NEG /HPF     Component      Latest Ref Rng & Units 3/13/2017   WBC      4.0 - 11.0 10e9/L 6.1   RBC Count      3.8 - 5.2 10e12/L 4.45   Hemoglobin      11.7 - 15.7 g/dL 13.3   Hematocrit      35.0 - 47.0 % 39.4   MCV      78 - 100 fl 89   MCH      26.5 - 33.0 pg 29.9   MCHC      31.5 - 36.5 g/dL 33.8   RDW      10.0 - 15.0 % 11.7   Platelet Count      150 - 450 10e9/L 157   Diff Method       Automated Method   % Neutrophils      % 60.1   % Lymphocytes      % 29.2   % Monocytes      % 7.2   % Eosinophils      % 3.0   % Basophils      % 0.5   Absolute Neutrophil      1.6 - 8.3 10e9/L 3.7   Absolute Lymphocytes      0.8 - 5.3 10e9/L 1.8   Absolute Monocytes      0.0 - 1.3 10e9/L 0.4   Absolute Eosinophils      0.0 - 0.7 10e9/L 0.2   Absolute Basophils      0.0 - 0.2 10e9/L 0.0   Sodium      133 - 144 mmol/L 137   Potassium      3.4 - 5.3 mmol/L 4.2   Chloride      94 - 109 mmol/L 103   Carbon Dioxide      20 - 32 mmol/L 28   Anion Gap      3 - 14 mmol/L 6    Glucose      70 - 99 mg/dL 104 (H)   Urea Nitrogen      7 - 30 mg/dL 15   Creatinine      0.52 - 1.04 mg/dL 0.70   GFR Estimate      >60 mL/min/1.7m2 85   GFR Estimate If Black      >60 mL/min/1.7m2 >90 . . .   Calcium      8.5 - 10.1 mg/dL 8.8       Recent Labs   Lab Test  04/12/16   0917  02/21/15   0620  02/20/15   0624  02/19/15   0610  02/18/15   1935  02/10/15   1636  05/08/14   1622   HGB   --    --   11.0*  11.5*   --   14.0   --    PLT   --   123*   --    --   148*  172   --    NA   --    --    --    --    --   140  142   POTASSIUM   --    --    --    --    --   4.7  4.3   CR   --    --    --    --   0.64  0.78  0.74   A1C  5.9   --    --    --    --    --   5.8        IMPRESSION:                                                    Reason for surgery/procedure: end stage arthritis left knee, scheduled for knee replacement    The proposed surgical procedure is considered INTERMEDIATE risk.    REVISED CARDIAC RISK INDEX  The patient has the following serious cardiovascular risks for perioperative complications such as (MI, PE, VFib and 3  AV Block):  No serious cardiac risks  INTERPRETATION: 0 risks: Class I (very low risk - 0.4% complication rate)    The patient has the following additional risks for perioperative complications:  No identified additional risks      ICD-10-CM    1. Preop general physical exam Z01.818 Urine Microscopic   2. Primary osteoarthritis of left knee M17.12    3. Acute cystitis without hematuria N30.00    4. Dysuria R30.0 *UA reflex to Microscopic and Culture ([facility] (except [place] and [place])   5. Nonspecific finding on examination of urine R82.90 Urine Culture Aerobic Bacterial       RECOMMENDATIONS:                                                    Patient will hold all medications the AM of surgery.  We will start her on cephalexin today for one week for the current UTI and ajust this if needed once the culture is back, will also plan to  recheck UA after treatment.          APPROVAL GIVEN to proceed with proposed procedure, without further diagnostic evaluation       Signed Electronically by: Bianca Peralta MD        3/31/17 addendum:  Follow-up UA after treatment:    Component      Latest Ref Rng & Units 3/30/2017   Color Urine       Yellow   Appearance Urine       Clear   Glucose Urine      NEG mg/dL Negative   Bilirubin Urine      NEG Negative   Ketones Urine      NEG mg/dL 15 (A)   Specific Gravity Urine      1.003 - 1.035 >1.030   pH Urine      5.0 - 7.0 pH 5.0   Protein Albumin Urine      NEG mg/dL 100 (A)   Urobilinogen Urine      0.2 - 1.0 EU/dL 0.2   Nitrite Urine      NEG Negative   Blood Urine      NEG Negative   Leukocyte Esterase Urine      NEG Negative   Source       Midstream Urine   WBC Urine      0 - 2 /HPF O - 2   RBC Urine      0 - 2 /HPF O - 2   Squamous Epithelial /LPF Urine      FEW /LPF Few       Bianca Peralta md    Copy of this evaluation report is provided to requesting physician.    [place] Preop Guidelines

## 2017-04-05 NOTE — LETTER
Transition Communication Hand-off for Care Transitions to Next Level of Care Provider    Name: Miiran Cruz  MRN #: [id number]  Primary Care Provider: Bianca Peralta     [address]  [facility] [address]     Reason for Hospitalization:  degenerative joint disease  Left knee DJD  Admit Date/Time: 4/5/2017 10:26 AM  Discharge Date: 4/8/17  Payor Source: Payor: [employer] / Plan: [employer] CARE [place] CARE / Product Type: HMO /            Reason for Communication Hand-off Referral: Other s/p left TKA    Discharge Plan:       Concern for non-adherence with plan of care:   Y/N no  Discharge Needs Assessment:  Needs       Most Recent Value    Equipment Currently Used at Home -- [handheld shower head, reacher]    Transportation Available family or friend will provide        Follow-up specialty is recommended: Yes    Follow-up plan:  Future Appointments  Date Time Provider Department Center   4/7/2017 12:30 PM Yesenia Ocampo PT [facility]       Any outstanding tests or procedures:        Referrals     Future Labs/Procedures    Home Care Referral     Comments:    ____________________________________________    Your provider has referred you to: [facility]: [facility] [phone]   [email or web address]    Extended Emergency Contact Information  Primary Emergency Contact: [place] [address] [facility]  Home Phone: [phone]  Work Phone: [phone]  Mobile Phone: [phone]  Relation: Daughter    Patient Anticipated Discharge Date: 4/8/17   RN, PT, HHA to begin 24 - 48 hours after discharge.  PLEASE EVALUATE AND TREAT (Evaluation timeline is 24 - 48 hrs. Please call if there is need for a variance to this timeline).    REASON FOR REFERRAL: Assessment & Treatment: PT and RN    ADDITIONAL SERVICES NEEDED: OT    OTHER PERTINENT INFORMATION:  Patient was last seen by provider on 4/7/17 for left TKA.    No current outpatient prescriptions on file.    Patient Active Problem List:     Tobacco use disorder     GERD (gastroesophageal reflux disease)     Morbid obesity (H)     Hyperlipidemia LDL goal <130     GBS (Guillain-[place] syndrome) (H)     Advanced directives, counseling/discussion     Sleep related leg cramps     Urinary, incontinence, stress female     Urethral hypermobility     OA (osteoarthritis) of knee     Status post total right knee replacement     Prediabetes     Status post total left knee replacement     Left knee DJD      Documentation of Face to Face and Certification for Home Health Services    I certify that patient, Mirian Cruz is under my care and that I, or a Nurse Practitioner or Physician's Assistant working with me, had a face-to-face encounter that meets the physician face-to-face encounter requirements with this patient on: 4/7/2017.    This encounter with the patient was in whole, or in part, for the following medical condition, which is the primary reason for Home Health Care: left TKA.    I certify that, based on my findings, the following services are medically necessary Home Health Services: Nursing, Occupational Therapy and Physical Therapy    My clinical findings support the need for the above services because: Nurse is needed: To assess left TKA after changes in medications or other medical regimen..    Further, I certify that my clinical findings support that this patient is homebound (i.e. absences from home require considerable and taxing effort and are for medical reasons or Mandae[employer] services or infrequently or of short duration when for other reasons) because: Requires assistance of another person or specialized equipment to access medical services because patient: Range of motion limitations prevents ability to exit home safely..    Based on the above findings, I certify that this patient is confined to the  "home and needs intermittent skilled nursing care, physical therapy and/or speech therapy.  The patient is under my care, and I have initiated the establishment of the plan of care.  This patient will be followed by a physician who will periodically review the plan of care.    Physician/Provider to provide follow up care: Bianca Peralta    Responsible PECOS certified Physician at time of discharge: Zelalem Mendez MD    Please be aware that coverage of these services is subject to the terms and limitations of your health insurance plan.  Call member services at your health plan with any benefit or coverage questions.            Home with [facility] ([phone] Fax: [phone]) patient states her daughter will be providing transportation, and patient's 23 year old grandson will be staying with her for \"a couple weeks\".      Renu Gaitan RN      "

## 2017-04-05 NOTE — IP AVS SNAPSHOT
[facility]    [address] [facility] [address]    Phone:  [phone]    Fax:  [phone]                                       After Visit Summary   4/5/2017    Mirian Cruz    MRN: [id number]           After Visit Summary Signature Page     I have received my discharge instructions, and my questions have been answered. I have discussed any challenges I see with this plan with the nurse or doctor.    ..........................................................................................................................................  Patient/Patient Representative Signature      ..........................................................................................................................................  Patient Representative Print Name and Relationship to Patient    ..................................................               ................................................  Date                                            Time    ..........................................................................................................................................  Reviewed by Signature/Title    ...................................................              ..............................................  Date                                                            Time

## 2017-04-05 NOTE — ANESTHESIA POSTPROCEDURE EVALUATION
Patient: Mirian Cruz    Procedure(s):  Left Total Knee Arthroplasty - Wound Class: I-Clean    Diagnosis:degenerative joint disease  Diagnosis Additional Information: No value filed.    Anesthesia Type:  Spinal    Note:  Anesthesia Post Evaluation    Patient location during evaluation: Bedside  Patient participation: Able to participate in evaluation but full recovery from regional anesthesia has not yet ocurrred but is anticipated to occur within 48 hours  Level of consciousness: awake and alert  Pain management: adequate  Airway patency: patent  Respiratory status: acceptable  Hydration status: acceptable  PONV: none     Anesthetic complications: None          Last vitals:  Vitals:    04/05/17 1445 04/05/17 1500 04/05/17 1545   BP: 108/64  139/72   Pulse:   66   Resp:  12 18   Temp:   36.4  C (97.5  F)   SpO2:   98%         Electronically Signed By: Brandon Alegria CRNA, APRN CRNA  April 5, 2017  4:08 PM

## 2017-04-06 ENCOUNTER — APPOINTMENT (OUTPATIENT)
Dept: PHYSICAL THERAPY | Facility: CLINIC | Age: 62
DRG: 470 | End: 2017-04-06
Attending: ORTHOPAEDIC SURGERY
Payer: COMMERCIAL

## 2017-04-06 ENCOUNTER — APPOINTMENT (OUTPATIENT)
Dept: OCCUPATIONAL THERAPY | Facility: CLINIC | Age: 62
DRG: 470 | End: 2017-04-06
Attending: ORTHOPAEDIC SURGERY
Payer: COMMERCIAL

## 2017-04-06 ENCOUNTER — APPOINTMENT (OUTPATIENT)
Dept: ULTRASOUND IMAGING | Facility: CLINIC | Age: 62
DRG: 470 | End: 2017-04-06
Attending: PHYSICIAN ASSISTANT
Payer: COMMERCIAL

## 2017-04-06 LAB
CREAT SERPL-MCNC: 0.72 MG/DL (ref 0.52–1.04)
GFR SERPL CREATININE-BSD FRML MDRD: 82 ML/MIN/1.7M2
GLUCOSE SERPL-MCNC: 108 MG/DL (ref 70–99)
HGB BLD-MCNC: 11.4 G/DL (ref 11.7–15.7)

## 2017-04-06 PROCEDURE — 99232 SBSQ HOSP IP/OBS MODERATE 35: CPT | Performed by: PHYSICIAN ASSISTANT

## 2017-04-06 PROCEDURE — 40000193 ZZH STATISTIC PT WARD VISIT: Performed by: PHYSICAL THERAPIST

## 2017-04-06 PROCEDURE — 97110 THERAPEUTIC EXERCISES: CPT | Mod: GP | Performed by: PHYSICAL THERAPIST

## 2017-04-06 PROCEDURE — 85018 HEMOGLOBIN: CPT | Performed by: ORTHOPAEDIC SURGERY

## 2017-04-06 PROCEDURE — 90732 PPSV23 VACC 2 YRS+ SUBQ/IM: CPT | Performed by: ORTHOPAEDIC SURGERY

## 2017-04-06 PROCEDURE — 25000132 ZZH RX MED GY IP 250 OP 250 PS 637: Performed by: FAMILY MEDICINE

## 2017-04-06 PROCEDURE — 25000128 H RX IP 250 OP 636: Performed by: ORTHOPAEDIC SURGERY

## 2017-04-06 PROCEDURE — 82947 ASSAY GLUCOSE BLOOD QUANT: CPT | Performed by: ORTHOPAEDIC SURGERY

## 2017-04-06 PROCEDURE — 93971 EXTREMITY STUDY: CPT | Mod: LT

## 2017-04-06 PROCEDURE — 97535 SELF CARE MNGMENT TRAINING: CPT | Mod: GO

## 2017-04-06 PROCEDURE — 97161 PT EVAL LOW COMPLEX 20 MIN: CPT | Mod: GP | Performed by: PHYSICAL THERAPIST

## 2017-04-06 PROCEDURE — 97165 OT EVAL LOW COMPLEX 30 MIN: CPT | Mod: GO

## 2017-04-06 PROCEDURE — 40000133 ZZH STATISTIC OT WARD VISIT

## 2017-04-06 PROCEDURE — 12000007 ZZH R&B INTERMEDIATE

## 2017-04-06 PROCEDURE — 82565 ASSAY OF CREATININE: CPT | Performed by: ORTHOPAEDIC SURGERY

## 2017-04-06 PROCEDURE — 36415 COLL VENOUS BLD VENIPUNCTURE: CPT | Performed by: ORTHOPAEDIC SURGERY

## 2017-04-06 PROCEDURE — 25000132 ZZH RX MED GY IP 250 OP 250 PS 637: Performed by: ORTHOPAEDIC SURGERY

## 2017-04-06 RX ORDER — AMOXICILLIN 250 MG
1 CAPSULE ORAL AT BEDTIME
Status: DISCONTINUED | OUTPATIENT
Start: 2017-04-06 | End: 2017-04-08 | Stop reason: HOSPADM

## 2017-04-06 RX ORDER — FERROUS SULFATE 325(65) MG
325 TABLET ORAL EVERY OTHER DAY
Status: DISCONTINUED | OUTPATIENT
Start: 2017-04-07 | End: 2017-04-08 | Stop reason: HOSPADM

## 2017-04-06 RX ADMIN — DIAZEPAM 5 MG: 5 TABLET ORAL at 16:48

## 2017-04-06 RX ADMIN — RANITIDINE HYDROCHLORIDE 150 MG: 150 TABLET, FILM COATED ORAL at 08:27

## 2017-04-06 RX ADMIN — OXYCODONE HYDROCHLORIDE 10 MG: 5 TABLET ORAL at 08:52

## 2017-04-06 RX ADMIN — ACETAMINOPHEN 975 MG: 325 TABLET, FILM COATED ORAL at 23:43

## 2017-04-06 RX ADMIN — PNEUMOCOCCAL VACCINE POLYVALENT 0.5 ML
25; 25; 25; 25; 25; 25; 25; 25; 25; 25; 25; 25; 25; 25; 25; 25; 25; 25; 25; 25; 25; 25; 25 INJECTION, SOLUTION INTRAMUSCULAR; SUBCUTANEOUS at 10:25

## 2017-04-06 RX ADMIN — GABAPENTIN 300 MG: 300 CAPSULE ORAL at 08:27

## 2017-04-06 RX ADMIN — OXYCODONE HYDROCHLORIDE 10 MG: 5 TABLET ORAL at 12:05

## 2017-04-06 RX ADMIN — SENNOSIDES AND DOCUSATE SODIUM 1 TABLET: 8.6; 5 TABLET ORAL at 20:08

## 2017-04-06 RX ADMIN — CEFAZOLIN SODIUM 2 G: 2 INJECTION, SOLUTION INTRAVENOUS at 04:20

## 2017-04-06 RX ADMIN — OXYCODONE HYDROCHLORIDE 5 MG: 5 TABLET ORAL at 04:19

## 2017-04-06 RX ADMIN — OXYCODONE HYDROCHLORIDE 10 MG: 5 TABLET ORAL at 20:09

## 2017-04-06 RX ADMIN — OXYCODONE HYDROCHLORIDE 10 MG: 5 TABLET ORAL at 15:10

## 2017-04-06 RX ADMIN — KETOROLAC TROMETHAMINE 30 MG: 30 INJECTION, SOLUTION INTRAMUSCULAR at 04:20

## 2017-04-06 RX ADMIN — GABAPENTIN 300 MG: 300 CAPSULE ORAL at 20:08

## 2017-04-06 RX ADMIN — KETOROLAC TROMETHAMINE 30 MG: 30 INJECTION, SOLUTION INTRAMUSCULAR at 10:26

## 2017-04-06 RX ADMIN — ACETAMINOPHEN 975 MG: 325 TABLET, FILM COATED ORAL at 08:27

## 2017-04-06 RX ADMIN — ENOXAPARIN SODIUM 40 MG: 40 INJECTION SUBCUTANEOUS at 12:05

## 2017-04-06 RX ADMIN — ACETAMINOPHEN 975 MG: 325 TABLET, FILM COATED ORAL at 16:49

## 2017-04-06 RX ADMIN — RANITIDINE HYDROCHLORIDE 150 MG: 150 TABLET, FILM COATED ORAL at 20:08

## 2017-04-06 RX ADMIN — OMEPRAZOLE 40 MG: 20 CAPSULE, DELAYED RELEASE ORAL at 08:26

## 2017-04-06 RX ADMIN — MIRABEGRON 50 MG: 50 TABLET, FILM COATED, EXTENDED RELEASE ORAL at 08:27

## 2017-04-06 RX ADMIN — SODIUM CHLORIDE: 9 INJECTION, SOLUTION INTRAVENOUS at 02:00

## 2017-04-06 NOTE — PROGRESS NOTES
"[facility] Progress Note      Post-operative Day: 1 Day Post-Op    Procedure(s):  Left Total Knee Arthroplasty - Wound Class: I-Clean      Subjective:    Pain: moderate, she needed to take 10 mg oxycodone for the last dose but otherwise is doing well  Chest pain, SOB:  No      Objective:  Blood pressure 114/68, pulse 74, temperature 98.1  F (36.7  C), temperature source Oral, resp. rate 18, height 1.626 m (5' 4\"), weight 106.6 kg (235 lb), SpO2 100 %, not currently breastfeeding.    Patient Vitals for the past 24 hrs:   BP Temp Temp src Pulse Heart Rate Resp SpO2 Height Weight   04/06/17 0710 114/68 98.1  F (36.7  C) Oral 74 - 18 100 % - -   04/06/17 0500 - - - - - - 97 % - -   04/06/17 0402 115/51 97.6  F (36.4  C) Oral 70 70 16 99 % - -   04/06/17 0230 - - - - - - 97 % - -   04/05/17 2303 104/56 98.8  F (37.1  C) Oral 71 71 15 96 % - -   04/05/17 2220 - - - - - 17 - - -   04/05/17 1955 113/57 97.9  F (36.6  C) Oral 64 - 18 98 % - -   04/05/17 1615 132/65 97.8  F (36.6  C) Oral - 70 18 98 % - -   04/05/17 1600 122/76 - - - 69 18 99 % - -   04/05/17 1545 139/72 97.5  F (36.4  C) Oral 66 66 18 98 % - -   04/05/17 1500 - - - - - 12 - - -   04/05/17 1445 108/64 - - - - - - - -   04/05/17 1438 - - - - - - 99 % - -   04/05/17 1436 - - - - - - 99 % - -   04/05/17 1430 111/64 - - - 70 18 99 % - -   04/05/17 1419 103/48 - - - - 18 - - -   04/05/17 1034 124/88 98.3  F (36.8  C) Oral 80 - 18 98 % 1.626 m (5' 4\") 106.6 kg (235 lb)       Wt Readings from Last 4 Encounters:   04/05/17 106.6 kg (235 lb)   03/13/17 106.5 kg (234 lb 14.4 oz)   01/24/17 108.8 kg (239 lb 12.8 oz)   12/01/16 105.9 kg (233 lb 6.4 oz)         Motor function, sensation, and circulation intact   Yes  Wound status: incisions are clean dry and intact. Yes  Calf tenderness: Left  Yes.  hospitalist has ordered US to r/o DVT.      Pertinent Labs   Lab Results: personally reviewed.     Recent Labs   Lab Test  04/06/17   0620  04/05/17   1052  " 03/13/17   1018  02/21/15   0620   02/10/15   1636  05/08/14   1622   09/29/11   0825  03/16/10   1400   INR   --   0.92   --    --    --    --    --    --    --    --    HGB  11.4*  14.4  13.3   --    < >  14.0   --    < >  16.5*   --    HCT   --   42.7  39.4   --    --   41.3   --    < >  47.8*   --    MCV   --   87  89   --    --   89   --    < >  86   --    PLT   --   177  157  123*   < >  172   --    < >  213   --    NA   --    --   137   --    --   140  142   < >  142   --    CRP   --    --    --    --    --    --    --    --   <5.0  8.9*    < > = values in this interval not displayed.       Plan: Anticoagulation protocol: Lovenox inpatient and then  mg daily at discharge  x 42  days            Pain medications:  Oxycodone, tylenol and complete post op IV toradol dosing            Weight bearing status:  WBAT            Disposition:  Home in 1-2 days based on PT goals and pain control. Patient requesting home health care PT as she had this with previous TKA and did well.  S.S. Consulted today to assist with D/C planning  Nursing to change dressings today with hemovac removal.  No additional adhesives to be put on knee (including bandages).  Only use dry 4x4 guaze over prineo and then apply 4 inch ACE to hold dressings in place and place TEDs on              Continue cares and rehabilitation     Report completed by:  Sammy Michael PA-C  Date: 4/6/2017  Time: 7:47 AM

## 2017-04-06 NOTE — CONSULTS
Care Transitions:  Chart reviewed and met with the patient for discharge planning.  The patient will return home and have outpatient physical therapy.  Her grandson will be staying with her while she recovers from surgery.  Vicky Galindo RN, Care Coordinator [phone]

## 2017-04-06 NOTE — PROGRESS NOTES
Ice packs over dressing on L knee, CDI. Drain emptied. Patient has slight numbness in leg still at this time. Plan to dangle patient at bedside and possibly stand with walker/assist at midnight. Patient's capno remains in place, PCD on right leg. Pt requests to sleep with socks off and out from under covers. Drinking water well.

## 2017-04-06 NOTE — PROGRESS NOTES
" 04/06/17 1500   Quick Adds   Type of Visit Initial Occupational Therapy Evaluation   Living Environment   Lives With (grandchild)   Living Arrangements apartment   Home Accessibility tub/shower is not walk in   Number of Stairs to Enter Home 0   Number of Stairs Within Home 0   Transportation Available family or friend will provide   Self-Care   Equipment Currently Used at Home (handheld shower head, reacher)   Activity/Exercise/Self-Care Comment Active, works as cook at [employer]   Functional Level Prior   Ambulation 0-->independent   Transferring 0-->independent   Toileting 0-->independent   Bathing 0-->independent   Dressing 0-->independent   Eating 0-->independent   Communication 0-->understands/communicates without difficulty   Swallowing 0-->swallows foods/liquids without difficulty   Cognition 0 - no cognition issues reported   Fall history within last six months no   General Information   Onset of Illness/Injury or Date of Surgery - Date 04/05/17   Referring Physician Zelalem Mendez MD   Patient/Family Goals Statement To return home   Additional Occupational Profile Info/Pertinent History of Current Problem Left Total Knee Arthroplasty    Precautions/Limitations (knee precautions)   Weight-Bearing Status - LLE weight-bearing as tolerated   Cognitive Status Examination   Orientation orientation to person, place and time   Level of Consciousness alert   Pain Assessment   Patient Currently in Pain Yes, see Vital Sign flowsheet  (\"low\")   Transfer Skill: Bed to Chair/Chair to Bed   Level of [place]: Bed to Chair stand-by assist   Physical Assist/Nonphysical Assist: Bed to Chair 1 person assist   Weight-Bearing Restrictions weight-bearing as tolerated   Assistive Device - Transfer Skill Bed to Chair Chair to Bed Rehab Eval rolling walker   Transfer Skill: Sit to Stand   Level of [place]: Sit/Stand stand-by assist   Physical Assist/Nonphysical Assist: Sit/Stand 1 person assist   Transfer Skill: Sit to " "Stand weight-bearing as tolerated   Assistive Device for Transfer: Sit/Stand rolling walker   Transfer Skill: Toilet Transfer   Level of [place]: Toilet stand-by assist   Physical Assist/Nonphysical Assist: Toilet 1 person assist   Weight-Bearing Restrictions: Toilet weight-bearing as tolerated   Assistive Device rolling walker   Upper Body Dressing   Level of [place]: Dress Upper Body independent   Lower Body Dressing   Level of [place]: Dress Lower Body stand-by assist  (for pants. Would need assist for sock- grandson can assist)   Physical Assist/Nonphysical Assist: Dress Lower Body 1 person assist   Toileting   Level of [place]: Toilet independent   Instrumental Activities of Daily Living (IADL)   IADL Comments Family can assist with meals upon discharge.    Activities of Daily Living Analysis   Impairments Contributing to Impaired Activities of Daily Living pain;post surgical precautions   General Therapy Interventions   Planned Therapy Interventions ADL retraining   Clinical Impression   Criteria for Skilled Therapeutic Interventions Met yes, treatment indicated   OT Diagnosis decreased independence with ADLs and functional mobility   Influenced by the following impairments decreased ROM in L knee, Increased pain   Assessment of Occupational Performance 1-3 Performance Deficits   Identified Performance Deficits dressing, tub/shower transfers   Clinical Decision Making (Complexity) Low complexity   Therapy Frequency daily   Predicted Duration of Therapy Intervention (days/wks) 2 days   Anticipated Discharge Disposition Home with Assist   Risks and Benefits of Treatment have been explained. Yes   Patient, Family & other staff in agreement with plan of care Yes   [facility] AM-PAC  \"6 Clicks\" Daily Activity Inpatient Short Form   1. Putting on and taking off regular lower body clothing? 3 - A Little   2. Bathing (including washing, rinsing, drying)? 3 - A Little   3. Toileting, which " includes using toilet, bedpan or urinal? 3 - A Little   4. Putting on and taking off regular upper body clothing? 4 - None   5. Taking care of personal grooming such as brushing teeth? 4 - None   6. Eating meals? 4 - None   Daily Activity Raw Score (Score out of 24.Lower scores equate to lower levels of function) 21   Total Evaluation Time   Total Evaluation Time (Minutes) 8     Amanda Muse OTR/L

## 2017-04-06 NOTE — PLAN OF CARE
Problem: Goal Outcome Summary  Goal: Goal Outcome Summary  PT-  Evaluation completed, RX initiated.  Pt reporting pain at 7/10.  Pt completes all mobility w/ SBA;  ambulated 60 feet x1 with PUW. SBA. steady gait.        REC- return home; home care PT to progress ROM/ strengthening and gait activities

## 2017-04-06 NOTE — PROGRESS NOTES
04/06/17 1000   Signing Clinician's Name / Credentials   Signing clinician's name / credentials Yesenia Ocampo PT   Quick Adds   Rehab Discipline PT   Additional Documentation   Rehab Comments Cancel- Order received, pt not seen this AM-  Having an US to r/o a DVT; will initiate PT this afternoon as appropriate

## 2017-04-06 NOTE — PLAN OF CARE
Problem: Goal Outcome Summary  Goal: Goal Outcome Summary  OT: Eval complete. Pt completes supine <> sit, ambulates to/from bathroom and complete toilet transfer all with SBA. Educated on safe hand placement during transfer as pt wanting to use B hands on walker to lower self. SBA to don pants, however needing assist with socks. Pt not interested in AE- reports family can assist. Plan to assess/practice tub/shower transfer tomorrow.     REC: Home with assist from family as needed.

## 2017-04-06 NOTE — PROGRESS NOTES
Patient was able to dangle at beside, slightly lightheaded for a moment and then resolved. Patient able to stand at bedside and took a couple steps with walker. Tolerated well. Pain management working well with Tylenol, Dilaudid and Toradol. Patient does not want a nicotine patch at this time. IS used, improving.

## 2017-04-06 NOTE — DISCHARGE INSTRUCTIONS
Orthopedic Instructions:  1.  Follow up with Sammy Michael PA-C.  in 2 weeks for post op check and x rays as scheduled.  Call [phone] if appointment needed or questions  2.  Use pain medication as directed  3.  Keep incision clean, covered and dry until post op appointment.  You may shower and get incision wet if no drainage is present  4.  Continue physical therapy as soon as possible.  You will need to call a therapy department of your choice to arrange future appointments.  Your order for physical therapy is included in your discharge paperwork.   5.  Take Aspirin 325mg  daily  for 42 days for anticoagulation    Hospitalist Instructions:  Please follow up with your PCP within 7-10 days of discharge to discuss your blood pressure.  It was a bit elevated while you were admitted; this may have been a result of stress/pain, but may also indicate that some of your blood pressure medications require adjustment.

## 2017-04-06 NOTE — PLAN OF CARE
Problem: Knee Replacement, Total (Adult)  Goal: Signs and Symptoms of Listed Potential Problems Will be Absent or Manageable (Knee Replacement, Total)  Signs and symptoms of listed potential problems will be absent or manageable by discharge/transition of care (reference Knee Replacement, Total (Adult) CPG).   Outcome: Improving  Patients pain is comfortably managed while taking tylenol and oxycodone.  Ice pack to knee.  Ambulated in hallway x 1 today.  Voiding okay and passing gas.

## 2017-04-06 NOTE — PLAN OF CARE
Problem: Knee Replacement, Total (Adult)  Intervention: Manage Acute Orthopedic-related Pain  Pt had a lot of pain when I first came in, but after ice and medication, she is now sleeping.  DDI. CMS good.  Appetite good. KENISHA Moura RN

## 2017-04-06 NOTE — PROGRESS NOTES
04/06/17 1300   Quick Adds   Type of Visit Initial PT Evaluation   Living Environment   Lives With (grandchild)   Living Arrangements apartment   Home Accessibility no concerns   Number of Stairs to Enter Home 0   Number of Stairs Within Home 0   Transportation Available family or friend will provide   Self-Care   Dominant Hand right   Functional Level Prior   Ambulation 0-->independent   Transferring 0-->independent   Toileting 0-->independent   Bathing 0-->independent   Dressing 0-->independent   Eating 0-->independent   Communication 0-->understands/communicates without difficulty   Swallowing 0-->swallows foods/liquids without difficulty   Cognition 0 - no cognition issues reported   Fall history within last six months no   Prior Functional Level Comment PLF- Pt indep. w/ ambulation with no device,pain. Active w/ work   General Information   Onset of Illness/Injury or Date of Surgery - Date 04/05/17   Referring Physician Comfort   Patient/Family Goals Statement S- Pt w/ goal of Dc to home   Pertinent History of Current Problem (include personal factors and/or comorbidities that impact the POC) Left Total Knee Arthroplasty . Hx of Right TKA   Weight-Bearing Status - LLE weight-bearing as tolerated   General Observations Pt alert- reporting pain at 7/10 , describes pain as an aching sensation    General Info Comments US negative fro DVT   Cognitive Status Examination   Orientation orientation to person, place and time   Level of Consciousness alert   Follows Commands and Answers Questions 100% of the time   Personal Safety and Judgment intact   Pain Assessment   Patient Currently in Pain Yes, see Vital Sign flowsheet   Integumentary/Edema   Integumentary/Edema Comments NT- ace wrap in place   Range of Motion (ROM)   ROM Comment 10- 75 degrees   Strength   Strength Comments Indep w/ SLR on left w/ extensor lag   MMT: Knee, Rehab Eval   Knee Flexion - Left Side (3/5) fair, left   Knee Extension - Left Side (3/5)  "fair, left   Bed Mobility   Bed Mobility Comments SBA supine> sitting   Transfer Skills   Transfer Comments SBA sit to stand w/ PUW. slow to obtain standing   Gait   Gait Comments Pt ambulated 60 feet x1 with PUW. SBA. steady gait   Balance   Balance Comments good dynamic standing balance-    Sensory Examination   Sensory Perception no deficits were identified   Modality Interventions   Planned Modality Interventions Cryotherapy   General Therapy Interventions   Planned Therapy Interventions gait training;ROM;strengthening   Clinical Impression   Criteria for Skilled Therapeutic Intervention yes, treatment indicated   PT Diagnosis Left TKA   Influenced by the following impairments Pain ,decreased strength/ ROM left LE   Functional limitations due to impairments ALtered  mobility   Clinical Presentation Stable/Uncomplicated   Clinical Presentation Rationale Pt a good rehab canidate. ; appears motivated to return to PLOF   Clinical Decision Making (Complexity) Low complexity   Therapy Frequency` 2 times/day   Predicted Duration of Therapy Intervention (days/wks) 1 day   Anticipated Equipment Needs at Discharge front wheeled walker   Anticipated Discharge Disposition Home with Home Therapy   Risk & Benefits of therapy have been explained Yes   Patient, Family & other staff in agreement with plan of care Yes   [facility] AM-PAC  \"6 Clicks\" V.2 Basic Mobility Inpatient Short Form   1. Turning from your back to your side while in a flat bed without using bedrails? 4 - None   2. Moving from lying on your back to sitting on the side of a flat bed without using bedrails? 3 - A Little   3. Moving to and from a bed to a chair (including a wheelchair)? 3 - A Little   4. Standing up from a chair using your arms (e.g., wheelchair, or bedside chair)? 3 - A Little   5. To walk in hospital room? 3 - A Little   6. Climbing 3-5 steps with a railing? 2 - A Lot   Basic Mobility Raw Score (Score out of 24.Lower scores equate to " lower levels of function) 18

## 2017-04-06 NOTE — PLAN OF CARE
Problem: Goal Outcome Summary  Goal: Goal Outcome Summary  Outcome: Improving  Patient's VSS, has slept during night. Requesting break from Capno this morning to eat a snack. Patient's johns DC'd, IV saline locked, hemovac emptied. Patient changed into clothes from home for the day, is standing at bedside well. She is looking forward to PT. Encouraged to continue taking pain medication to stay ahead of the pain; agrees to plan. Using Toradol IV, scheduled Tylenol and Oxy 5mg. Dressing on knee CDI, using ice packs. Doing well.

## 2017-04-07 ENCOUNTER — APPOINTMENT (OUTPATIENT)
Dept: PHYSICAL THERAPY | Facility: CLINIC | Age: 62
DRG: 470 | End: 2017-04-07
Attending: ORTHOPAEDIC SURGERY
Payer: COMMERCIAL

## 2017-04-07 LAB
GLUCOSE BLDC GLUCOMTR-MCNC: 103 MG/DL (ref 70–99)
GLUCOSE SERPL-MCNC: 110 MG/DL (ref 70–99)
HGB BLD-MCNC: 12.3 G/DL (ref 11.7–15.7)

## 2017-04-07 PROCEDURE — 36415 COLL VENOUS BLD VENIPUNCTURE: CPT | Performed by: ORTHOPAEDIC SURGERY

## 2017-04-07 PROCEDURE — 97110 THERAPEUTIC EXERCISES: CPT | Mod: GP | Performed by: PHYSICAL THERAPIST

## 2017-04-07 PROCEDURE — 40000193 ZZH STATISTIC PT WARD VISIT: Performed by: PHYSICAL THERAPIST

## 2017-04-07 PROCEDURE — 12000007 ZZH R&B INTERMEDIATE

## 2017-04-07 PROCEDURE — 97116 GAIT TRAINING THERAPY: CPT | Mod: GP | Performed by: PHYSICAL THERAPIST

## 2017-04-07 PROCEDURE — 00000146 ZZHCL STATISTIC GLUCOSE BY METER IP

## 2017-04-07 PROCEDURE — 25000132 ZZH RX MED GY IP 250 OP 250 PS 637: Performed by: FAMILY MEDICINE

## 2017-04-07 PROCEDURE — 25000132 ZZH RX MED GY IP 250 OP 250 PS 637: Performed by: ORTHOPAEDIC SURGERY

## 2017-04-07 PROCEDURE — 25000132 ZZH RX MED GY IP 250 OP 250 PS 637: Performed by: PHYSICIAN ASSISTANT

## 2017-04-07 PROCEDURE — 85018 HEMOGLOBIN: CPT | Performed by: ORTHOPAEDIC SURGERY

## 2017-04-07 PROCEDURE — 25000128 H RX IP 250 OP 636: Performed by: ORTHOPAEDIC SURGERY

## 2017-04-07 PROCEDURE — 99232 SBSQ HOSP IP/OBS MODERATE 35: CPT | Performed by: PHYSICIAN ASSISTANT

## 2017-04-07 PROCEDURE — 82947 ASSAY GLUCOSE BLOOD QUANT: CPT | Performed by: ORTHOPAEDIC SURGERY

## 2017-04-07 RX ADMIN — ENOXAPARIN SODIUM 40 MG: 40 INJECTION SUBCUTANEOUS at 12:12

## 2017-04-07 RX ADMIN — OXYCODONE HYDROCHLORIDE 10 MG: 5 TABLET ORAL at 13:42

## 2017-04-07 RX ADMIN — GABAPENTIN 300 MG: 300 CAPSULE ORAL at 07:52

## 2017-04-07 RX ADMIN — ACETAMINOPHEN 975 MG: 325 TABLET, FILM COATED ORAL at 07:52

## 2017-04-07 RX ADMIN — SENNOSIDES AND DOCUSATE SODIUM 1 TABLET: 8.6; 5 TABLET ORAL at 19:29

## 2017-04-07 RX ADMIN — RANITIDINE HYDROCHLORIDE 150 MG: 150 TABLET, FILM COATED ORAL at 07:53

## 2017-04-07 RX ADMIN — ACETAMINOPHEN 975 MG: 325 TABLET, FILM COATED ORAL at 16:14

## 2017-04-07 RX ADMIN — IRON 325 MG: 65 TABLET ORAL at 07:53

## 2017-04-07 RX ADMIN — OXYCODONE HYDROCHLORIDE 10 MG: 5 TABLET ORAL at 10:28

## 2017-04-07 RX ADMIN — OXYCODONE HYDROCHLORIDE 10 MG: 5 TABLET ORAL at 19:34

## 2017-04-07 RX ADMIN — GABAPENTIN 300 MG: 300 CAPSULE ORAL at 19:29

## 2017-04-07 RX ADMIN — MIRABEGRON 50 MG: 50 TABLET, FILM COATED, EXTENDED RELEASE ORAL at 07:55

## 2017-04-07 RX ADMIN — RANITIDINE HYDROCHLORIDE 150 MG: 150 TABLET, FILM COATED ORAL at 19:29

## 2017-04-07 RX ADMIN — TRAMADOL HYDROCHLORIDE 50 MG: 50 TABLET, COATED ORAL at 23:23

## 2017-04-07 RX ADMIN — TRAMADOL HYDROCHLORIDE 50 MG: 50 TABLET, COATED ORAL at 16:44

## 2017-04-07 RX ADMIN — ACETAMINOPHEN 975 MG: 325 TABLET, FILM COATED ORAL at 23:23

## 2017-04-07 RX ADMIN — OXYCODONE HYDROCHLORIDE 10 MG: 5 TABLET ORAL at 03:33

## 2017-04-07 RX ADMIN — OMEPRAZOLE 40 MG: 20 CAPSULE, DELAYED RELEASE ORAL at 07:53

## 2017-04-07 RX ADMIN — OXYCODONE HYDROCHLORIDE 10 MG: 5 TABLET ORAL at 06:33

## 2017-04-07 NOTE — PLAN OF CARE
Problem: Knee Replacement, Total (Adult)  Goal: Signs and Symptoms of Listed Potential Problems Will be Absent or Manageable (Knee Replacement, Total)  Signs and symptoms of listed potential problems will be absent or manageable by discharge/transition of care (reference Knee Replacement, Total (Adult) CPG).   Outcome: Improving  Patient working with PT and called for pain medications; pain worse with exercises.  Medicated with oxycodone 10 mg po at this time.  Ice to knee.

## 2017-04-07 NOTE — PROGRESS NOTES
"[facility] Orthopaedics Progress Note      Post-operative Day: 2 Days Post-Op    Procedure(s):  Left Total Knee Arthroplasty - Wound Class: I-Clean      Subjective:    Pain: minimal to moderate at times.   Chest pain, SOB:  No  Tolerating diet, voiding, +flatus, no BM.    Objective:  Blood pressure 102/62, pulse 98, temperature 98.3  F (36.8  C), temperature source Oral, resp. rate 18, height 1.626 m (5' 4\"), weight 106.6 kg (235 lb), SpO2 92 %, not currently breastfeeding.    Patient Vitals for the past 24 hrs:   BP Temp Temp src Pulse Heart Rate Resp SpO2   04/07/17 0727 102/62 98.3  F (36.8  C) Oral 98 - 18 92 %   04/06/17 2356 148/52 99.6  F (37.6  C) Oral 93 - 16 98 %   04/06/17 1902 124/64 99.5  F (37.5  C) Oral - 79 18 94 %   04/06/17 1536 122/58 98.1  F (36.7  C) Oral - 75 18 98 %   04/06/17 1054 107/57 98.5  F (36.9  C) Oral 74 - 16 95 %       Wt Readings from Last 4 Encounters:   04/05/17 106.6 kg (235 lb)   03/13/17 106.5 kg (234 lb 14.4 oz)   01/24/17 108.8 kg (239 lb 12.8 oz)   12/01/16 105.9 kg (233 lb 6.4 oz)         Motor function, sensation, and circulation intact   Yes  Wound status: incisions are clean dry and intact. Yes  Calf tenderness: Bilateral  No    Pertinent Labs   Lab Results: personally reviewed.     Recent Labs   Lab Test  04/07/17   0618  04/06/17   0620  04/05/17   1052  03/13/17   1018  02/21/15   0620   02/10/15   1636  05/08/14   1622   09/29/11   0825  03/16/10   1400   INR   --    --   0.92   --    --    --    --    --    --    --    --    HGB  12.3  11.4*  14.4  13.3   --    < >  14.0   --    < >  16.5*   --    HCT   --    --   42.7  39.4   --    --   41.3   --    < >  47.8*   --    MCV   --    --   87  89   --    --   89   --    < >  86   --    PLT   --    --   177  157  123*   < >  172   --    < >  213   --    NA   --    --    --   137   --    --   140  142   < >  142   --    CRP   --    --    --    --    --    --    --    --    --   <5.0  8.9*    < > = values in this " interval not displayed.       Plan: Anticoagulation protocol: Lovenox inpatient and then  mg daily at discharge  x 42  days            Pain medications:  oxycodone, tylenol and vistaril            Weight bearing status:  WBAT            Disposition:  Home tomorrow when passes PT and pain controlled. Req home care.             Continue cares and rehabilitation     Report completed by:  Shanti Fulton PA-C, ALEA  Date: 4/7/2017  Time: 8:18 AM

## 2017-04-07 NOTE — PLAN OF CARE
"Problem: Knee Replacement, Total (Adult)  Goal: Signs and Symptoms of Listed Potential Problems Will be Absent or Manageable (Knee Replacement, Total)  Signs and symptoms of listed potential problems will be absent or manageable by discharge/transition of care (reference Knee Replacement, Total (Adult) CPG).   Outcome: Improving  Pt is taking oxycodone 10 mg every 3 hours as needed and took an ultram 50 mg the last time she needed pain medication.  Patient reports \"It feels better when I am up.\"  Wants to alternate oxycodone and ultram.      "

## 2017-04-07 NOTE — PROGRESS NOTES
Pt doing well, good attitude toward doing as much as she can herself.  Pain meds given before valium wore off so pain would be under control when she was more awake.  KENISHA Moura rn

## 2017-04-07 NOTE — PLAN OF CARE
Problem: Goal Outcome Summary  Goal: Goal Outcome Summary  OT: Due to increased pain pt declines tub/shower transfer practice at this time. Will complete tomorrow prior to discharge.

## 2017-04-07 NOTE — PLAN OF CARE
"Problem: Goal Outcome Summary  Goal: Goal Outcome Summary  Outcome: Improving  A/Ox4. VSS. Up with standby assist.  Stated adequate pain control w/ PRN Oxycodone and ice applied. /52  Pulse 93  Temp 99.6  F (37.6  C) (Oral)  Resp 16  Ht 1.626 m (5' 4\")  Wt 106.6 kg (235 lb)  SpO2 98%  Breastfeeding? No  BMI 40.34 kg/m2          "

## 2017-04-07 NOTE — PLAN OF CARE
Problem: Goal Outcome Summary  Goal: Goal Outcome Summary  PT-  Pain more controlled this afternoon, but pt continues to report increased pain w/ WB.   Pt indep. W/ bed mobility, transfers; able to amb. 100 feet w/ PUW , SBA.   AROM R knee 15-80 degrees; AAROM w/ SLR on right     REC- return home w/ home care PT

## 2017-04-07 NOTE — PROGRESS NOTES
[facility] Medicine Progress Note  Date of Service: 04/07/2017    Assessment & Plan   Mirian Cruz is a 61 year old female who presented on 4/5/2017 for scheduled Procedure(s):  ARTHROPLASTY KNEE by Zelalem Mendez MD and is being followed by the hospital medicine service for co-management of acute and/or chronic perioperative medical problems.    S/p Procedure(s):  ARTHROPLASTY KNEE  - POD #2  - pain control, wound cares, physical therapy, occupational therapy and DVT prophylaxis per orthopedic surgery service     Mild HTN  Seen POD #2.  Possible this is all secondary to pain.  - recommend outpatient follow up, pt agrees to see within 7-10 days of discharge    Positive Staci's to Left LE  Surgical Side. Low suspicion given nature of pain is mild, no swelling, no erythema. L LE doppler US negative for DVT     Prediabetes  BG reviewed, stable without medication     Tobacco use disorder  Nicotine patch available, pt declining at this point in time     GERD (gastroesophageal reflux disease)  - continue PTA omeprazole     Overactive Bladder  - continue PTA myrbetriq        DVT Prophylaxis: as per orthopedic surgery service - Defer to primary service  Code Status: Full Code     Lines: PIV, without edema/erythema   House catheter: not indicated     Discussion: Medically, the patient appears stable.     Disposition: Anticipate discharge today or tomorrow. Pt plans to discharge to home with outpt PT. She lives alone, but has a 23 year old grandson who will be living with her immediately after surgery.     Attestation:  I have reviewed today's vital signs, notes, medications, labs and imaging.     Sydney Raza PA-C      Interval History   Pain controlled.  Wishes to go home tomorrow for additional PT/help today given pain she experienced yesterday and that she is going home relatively independent.  Denies nausea, CP, SOB, cough, abdominal pain, dysuria, lower extremity  pain/swelling.  Passing flatus.  Voiding freely.    Physical Exam   Temp:  [98.1  F (36.7  C)-99.6  F (37.6  C)] 99.6  F (37.6  C)  Pulse:  [74-93] 93  Heart Rate:  [75-79] 79  Resp:  [16-18] 16  BP: (107-148)/(52-68) 148/52  SpO2:  [94 %-100 %] 98 %    Weights:   Vitals:    04/05/17 1034   Weight: 106.6 kg (235 lb)    Body mass index is 40.34 kg/(m^2).    General: alert and oriented x4, in no acute distress  CV: Regular rate/rhythm, no appreciable murmur, rub, or gallop  Respiratory: CTA bilaterally, equal chest expansion  GI: Soft, nontender, normoactive BS  Skin: Warm and dry  Musculoskeletal: Negative homans bilaterally.  Still some mild tenderness to palpation of left posterior calf - improved as compared to yesterday. No edema to lower extremities.  Neuro: equal  strength    Data     Recent Labs  Lab 04/07/17  0618 04/06/17  0620 04/05/17  1052   WBC  --   --  6.2   HGB 12.3 11.4* 14.4   MCV  --   --  87   PLT  --   --  177   INR  --   --  0.92   CR  --  0.72  --    GLC  --  108*  --          Recent Labs  Lab 04/07/17  0654 04/06/17  0620   GLC  --  108*   *  --         Unresulted Labs Ordered in the Past 30 Days of this Admission     Date and Time Order Name Status Description    4/7/2017 0618 Glucose In process            Imaging  Recent Results (from the past 24 hour(s))   US Lower Extremity Venous Duplex Left    Narrative    VENOUS ULTRASOUND LEFT LEG  4/6/2017 1:32 PM     HISTORY: positive inez's to LLE, left knee replacement    COMPARISON: None.    FINDINGS:  Examination of the deep veins with graded compression and  color flow Doppler with spectral wave form analysis was performed.      Impression    IMPRESSION: No evidence of deep venous thrombosis.    REN CONNOLLY MD        I reviewed all new labs and imaging results over the last 24 hours. I personally reviewed no images or EKG's today.    Medications        ferrous sulfate (IRON) tablet 325 mg  325 mg Oral Every Other Day      senna-docusate  1 tablet Oral At Bedtime     mirabegron  50 mg Oral Daily     omeprazole  40 mg Oral Daily     sodium chloride (PF)  3 mL Intracatheter Q8H     enoxaparin  40 mg Subcutaneous Q24H     ranitidine  150 mg Oral BID     acetaminophen  975 mg Oral Q8H     gabapentin  300 mg Oral BID     nicotine   Transdermal Q8H     nicotine   Transdermal Daily     nicotine  1 patch Transdermal Daily       Sydney Raza PA-C

## 2017-04-07 NOTE — CONSULTS
Transition Communication Hand-off for Care Transitions to Next Level of Care Provider    Name: Mirina Cruz  MRN #: [id number]  Primary Care Provider: Bianca Peralta     [address]  [facility] [address]     Reason for Hospitalization:  degenerative joint disease  Left knee DJD  Admit Date/Time: 4/5/2017 10:26 AM  Discharge Date: 4/8/17  Payor Source: Payor: [employer] / Plan: [employer] [place] [place] CARE / Product Type: HMO /            Reason for Communication Hand-off Referral: Other s/p left TKA    Discharge Plan:       Concern for non-adherence with plan of care:   Y/N no  Discharge Needs Assessment:  Needs       Most Recent Value    Equipment Currently Used at Home -- [handheld shower head, reacher]    Transportation Available family or friend will provide        Follow-up specialty is recommended: Yes    Follow-up plan:  Future Appointments  Date Time Provider Department Center   4/7/2017 12:30 PM Yesenia Ocampo PT [facility]       Any outstanding tests or procedures:        Referrals     Future Labs/Procedures    Home Care Referral     Comments:    ____________________________________________    Your provider has referred you to: [facility]: [facility] [phone]   [email or web address]    Extended Emergency Contact Information  Primary Emergency Contact: BRYANT[place] [address] [facility]  Home Phone: [phone]  Work Phone: [phone]  Mobile Phone: [phone]  Relation: Daughter    Patient Anticipated Discharge Date: 4/8/17   RN, PT, HHA to begin 24 - 48 hours after discharge.  PLEASE EVALUATE AND TREAT (Evaluation timeline is 24 - 48 hrs. Please call if there is need for a variance to this timeline).    REASON FOR REFERRAL: Assessment & Treatment: PT and RN    ADDITIONAL SERVICES NEEDED: OT    OTHER PERTINENT INFORMATION:  Patient was last seen by provider on 4/7/17 for left TKA.    No current outpatient prescriptions on file.    Patient Active Problem List:     Tobacco use disorder     GERD (gastroesophageal reflux disease)     Morbid obesity (H)     Hyperlipidemia LDL goal <130     GBS (Guillain-[place] syndrome) (H)     Advanced directives, counseling/discussion     Sleep related leg cramps     Urinary, incontinence, stress female     Urethral hypermobility     OA (osteoarthritis) of knee     Status post total right knee replacement     Prediabetes     Status post total left knee replacement     Left knee DJD      Documentation of Face to Face and Certification for Home Health Services    I certify that patient, Mirian Cruz is under my care and that I, or a Nurse Practitioner or Physician's Assistant working with me, had a face-to-face encounter that meets the physician face-to-face encounter requirements with this patient on: 4/7/2017.    This encounter with the patient was in whole, or in part, for the following medical condition, which is the primary reason for Home Health Care: left TKA.    I certify that, based on my findings, the following services are medically necessary Home Health Services: Nursing, Occupational Therapy and Physical Therapy    My clinical findings support the need for the above services because: Nurse is needed: To assess left TKA after changes in medications or other medical regimen..    Further, I certify that my clinical findings support that this patient is homebound (i.e. absences from home require considerable and taxing effort and are for medical reasons or [employer] services or infrequently or of short duration when for other reasons) because: Requires assistance of another person or specialized equipment to access medical services because patient: Range of motion limitations prevents ability to exit home safely..    Based on the above findings, I certify that this patient is confined to the  "home and needs intermittent skilled nursing care, physical therapy and/or speech therapy.  The patient is under my care, and I have initiated the establishment of the plan of care.  This patient will be followed by a physician who will periodically review the plan of care.    Physician/Provider to provide follow up care: Bianca Peralta    Responsible PECOS certified Physician at time of discharge: Zelalem Mendez MD    Please be aware that coverage of these services is subject to the terms and limitations of your health insurance plan.  Call member services at your health plan with any benefit or coverage questions.            Home with [facility] ([phone] Fax: [phone]) patient states her daughter will be providing transportation, and patient's 23 year old grandson will be staying with her for \"a couple weeks\".      Renu Gaitan RN        "

## 2017-04-08 ENCOUNTER — APPOINTMENT (OUTPATIENT)
Dept: PHYSICAL THERAPY | Facility: CLINIC | Age: 62
DRG: 470 | End: 2017-04-08
Attending: ORTHOPAEDIC SURGERY
Payer: COMMERCIAL

## 2017-04-08 VITALS
DIASTOLIC BLOOD PRESSURE: 88 MMHG | TEMPERATURE: 98.1 F | OXYGEN SATURATION: 97 % | WEIGHT: 235 LBS | HEART RATE: 83 BPM | SYSTOLIC BLOOD PRESSURE: 119 MMHG | BODY MASS INDEX: 40.12 KG/M2 | RESPIRATION RATE: 16 BRPM | HEIGHT: 64 IN

## 2017-04-08 LAB — PLATELET # BLD AUTO: 140 10E9/L (ref 150–450)

## 2017-04-08 PROCEDURE — 36415 COLL VENOUS BLD VENIPUNCTURE: CPT | Performed by: ORTHOPAEDIC SURGERY

## 2017-04-08 PROCEDURE — 97110 THERAPEUTIC EXERCISES: CPT | Mod: GP | Performed by: PHYSICAL THERAPY ASSISTANT

## 2017-04-08 PROCEDURE — 25000132 ZZH RX MED GY IP 250 OP 250 PS 637: Performed by: ORTHOPAEDIC SURGERY

## 2017-04-08 PROCEDURE — 97535 SELF CARE MNGMENT TRAINING: CPT | Mod: GO

## 2017-04-08 PROCEDURE — 40000133 ZZH STATISTIC OT WARD VISIT

## 2017-04-08 PROCEDURE — 85049 AUTOMATED PLATELET COUNT: CPT | Performed by: ORTHOPAEDIC SURGERY

## 2017-04-08 PROCEDURE — 40000193 ZZH STATISTIC PT WARD VISIT: Performed by: PHYSICAL THERAPY ASSISTANT

## 2017-04-08 RX ORDER — TRAMADOL HYDROCHLORIDE 50 MG/1
25-50 TABLET ORAL EVERY 6 HOURS PRN
Qty: 40 TABLET | Refills: 0 | Status: SHIPPED | OUTPATIENT
Start: 2017-04-08 | End: 2017-05-23

## 2017-04-08 RX ORDER — ACETAMINOPHEN 325 MG/1
975 TABLET ORAL EVERY 8 HOURS
Qty: 100 TABLET | Refills: 0 | Status: SHIPPED | OUTPATIENT
Start: 2017-04-08 | End: 2017-10-23

## 2017-04-08 RX ORDER — ASPIRIN 325 MG
325 TABLET, DELAYED RELEASE (ENTERIC COATED) ORAL
Qty: 42 TABLET | Refills: 0 | Status: SHIPPED | OUTPATIENT
Start: 2017-04-08 | End: 2018-07-23

## 2017-04-08 RX ORDER — AMOXICILLIN 250 MG
1 CAPSULE ORAL AT BEDTIME
Qty: 100 TABLET | Refills: 0 | Status: SHIPPED | OUTPATIENT
Start: 2017-04-08 | End: 2018-07-23

## 2017-04-08 RX ORDER — OXYCODONE HYDROCHLORIDE 5 MG/1
5-10 TABLET ORAL
Qty: 40 TABLET | Refills: 0 | Status: SHIPPED | OUTPATIENT
Start: 2017-04-08 | End: 2017-05-23

## 2017-04-08 RX ADMIN — MIRABEGRON 50 MG: 50 TABLET, FILM COATED, EXTENDED RELEASE ORAL at 08:45

## 2017-04-08 RX ADMIN — OXYCODONE HYDROCHLORIDE 10 MG: 5 TABLET ORAL at 08:41

## 2017-04-08 RX ADMIN — TRAMADOL HYDROCHLORIDE 50 MG: 50 TABLET, COATED ORAL at 11:26

## 2017-04-08 RX ADMIN — GABAPENTIN 300 MG: 300 CAPSULE ORAL at 08:45

## 2017-04-08 RX ADMIN — OMEPRAZOLE 40 MG: 20 CAPSULE, DELAYED RELEASE ORAL at 08:41

## 2017-04-08 RX ADMIN — OXYCODONE HYDROCHLORIDE 10 MG: 5 TABLET ORAL at 02:02

## 2017-04-08 RX ADMIN — TRAMADOL HYDROCHLORIDE 50 MG: 50 TABLET, COATED ORAL at 05:15

## 2017-04-08 RX ADMIN — RANITIDINE HYDROCHLORIDE 150 MG: 150 TABLET, FILM COATED ORAL at 08:41

## 2017-04-08 RX ADMIN — ACETAMINOPHEN 975 MG: 325 TABLET, FILM COATED ORAL at 08:41

## 2017-04-08 NOTE — PLAN OF CARE
Problem: Goal Outcome Summary  Goal: Goal Outcome Summary  Outcome: Adequate for Discharge Date Met:  04/08/17  OT NOTE: Pt seen amb to/from therapy with one rest needed for approx 70 ft with FWW. Pt attempted tub transfer with safety concerns unable to transfer safely due to lack of grab bars at home and limited space for transfer with walker or chair in tub due to tight spacing with toilet. Pt agreed to wait on showering until [facility] makes assessment and recommendation for safe transfer. Pt is Mod I with toilet transfer and toilet ingOccupational Therapy Discharge Summary     Reason for therapy discharge:    Discharged to home with home therapy.     Progress towards therapy goal(s). See goals on Care Plan in [facility] electronic health record for goal details.  Goals partially met.  Barriers to achieving goals:   unable to safety perform tub transfer in acute setting.     Therapy recommendation(s):    Continued therapy is recommended.  Rationale/Recommendations:  with [facility] for tub transfer recommendation.     Recommendation: D/C pt home with  services  Yari Montalvo OTR/L

## 2017-04-08 NOTE — PROGRESS NOTES
"[facility] Orthopaedics Progress Note      Post-operative Day: 3 Days Post-Op    Procedure(s):  Left Total Knee Arthroplasty - Wound Class: I-Clean      Subjective:    Pain: minimal. Worse with movement.   Chest pain, SOB:  No      Objective:  Blood pressure 129/64, pulse 83, temperature 98.4  F (36.9  C), temperature source Oral, resp. rate 18, height 1.626 m (5' 4\"), weight 106.6 kg (235 lb), SpO2 98 %, not currently breastfeeding.    Patient Vitals for the past 24 hrs:   BP Temp Temp src Pulse Resp SpO2   04/07/17 2341 129/64 98.4  F (36.9  C) Oral 83 18 98 %   04/07/17 1926 120/69 98.8  F (37.1  C) Oral 84 18 98 %   04/07/17 1520 126/70 99.3  F (37.4  C) Oral 81 18 100 %   04/07/17 1045 117/67 98.3  F (36.8  C) Oral 82 18 99 %       Wt Readings from Last 4 Encounters:   04/05/17 106.6 kg (235 lb)   03/13/17 106.5 kg (234 lb 14.4 oz)   01/24/17 108.8 kg (239 lb 12.8 oz)   12/01/16 105.9 kg (233 lb 6.4 oz)         Motor function, sensation, and circulation intact   Yes  Wound status: dressings are clean dry and intact. Yes  Calf tenderness: Bilateral  No    Pertinent Labs   Lab Results: personally reviewed.     Recent Labs   Lab Test  04/08/17   0620  04/07/17   0618  04/06/17   0620  04/05/17   1052  03/13/17   1018   02/10/15   1636  05/08/14   1622   09/29/11   0825  03/16/10   1400   INR   --    --    --   0.92   --    --    --    --    --    --    --    HGB   --   12.3  11.4*  14.4  13.3   < >  14.0   --    < >  16.5*   --    HCT   --    --    --   42.7  39.4   --   41.3   --    < >  47.8*   --    MCV   --    --    --   87  89   --   89   --    < >  86   --    PLT  140*   --    --   177  157   < >  172   --    < >  213   --    NA   --    --    --    --   137   --   140  142   < >  142   --    CRP   --    --    --    --    --    --    --    --    --   <5.0  8.9*    < > = values in this interval not displayed.       Plan: Anticoagulation protocol: Lovenox inpatient and then  mg daily at discharge  x 42  " days            Pain medications:  oxycodone, tramadol and tylenol            Weight bearing status:  WBAT            Disposition:  Home today with home care.             Continue cares and rehabilitation     Report completed by:  Zina Ruiz PA-C  Date: 4/8/2017  Time: 7:46 AM

## 2017-04-08 NOTE — PLAN OF CARE
Problem: Goal Outcome Summary  Goal: Goal Outcome Summary  Outcome: Improving  Patient is doing well, VSS. Drinking lots of water and tolerating well ambulating to bathroom with walker and standby assist. Pain is being well controlled with scheduled tylenol, as well as using Ultram and Nina. Bandage clean, dry, intact, ice utilized for slight swelling and pain. Patient has non-productive cough, on room air. Does not want nicotine patch. Is looking forward to discharging home and having her grandson stay with her to help for a while.

## 2017-04-08 NOTE — PHARMACY - DISCHARGE MEDICATION RECONCILIATION
Discharge medication review for this patient is complete. Pharmacist assisted with medication reconciliation of discharge medications with prior to admission medications.     The following changes were made to the discharge medication list based on pharmacist review:  Added:  Aspirin, acetaminophen, senna, and oxycodone  Discontinued: NA  Changed: decrease tramadol dose      Patient's Discharge Medication List  - medications as listed on After Visit Summary (AVS)     Review of your medicines      START taking       Dose / Directions    acetaminophen 325 MG tablet   Commonly known as:  TYLENOL        Dose:  975 mg   Take 3 tablets (975 mg) by mouth every 8 hours   Quantity:  100 tablet   Refills:  0       aspirin 325 MG EC tablet   Notes to Patient:  Start today 4/8/17        Dose:  325 mg   Take 1 tablet (325 mg) by mouth daily with food   Quantity:  42 tablet   Refills:  0       oxyCODONE 5 MG IR tablet   Commonly known as:  ROXICODONE        Dose:  5-10 mg   Take 1-2 tablets (5-10 mg) by mouth every 3 hours as needed for moderate to severe pain   Quantity:  40 tablet   Refills:  0       senna-docusate 8.6-50 MG per tablet   Commonly known as:  SENOKOT-S;PERICOLACE        Dose:  1 tablet   Take 1 tablet by mouth At Bedtime   Quantity:  100 tablet   Refills:  0         CONTINUE these medicines which may have CHANGED, or have new prescriptions. If we are uncertain of the size of tablets/capsules you have at home, strength may be listed as something that might have changed.       Dose / Directions    traMADol 50 MG tablet   Commonly known as:  ULTRAM   This may have changed:    - how much to take  - how to take this  - when to take this  - reasons to take this        Dose:  25-50 mg   Take 0.5-1 tablets (25-50 mg) by mouth every 6 hours as needed for moderate pain   Quantity:  40 tablet   Refills:  0         CONTINUE these medicines which have NOT CHANGED       Dose / Directions    ascorbic acid 500 MG tablet    Commonly known as:  VITAMIN C   Notes to Patient:  Resume          Dose:  500 mg   Take 1 tablet by mouth daily.   Quantity:  100 tablet   Refills:  12       calcium 600 MG tablet   Notes to Patient:  Resume          1 daily   Refills:  0       cholecalciferol 1000 UNIT tablet   Commonly known as:  vitamin D   Notes to Patient:  Resume          Dose:  1 tablet   Take 1 tablet by mouth daily.   Refills:  0       fish oil-omega-3 fatty acids 1000 MG capsule   Notes to Patient:  Resume          1 daily   Refills:  0       IRON SUPPLEMENT PO        Dose:  325 mg   Take 325 mg by mouth every other day   Refills:  0       mirabegron 50 MG 24 hr tablet   Commonly known as:  MYRBETRIQ   Used for:  Bladder infection        Dose:  50 mg   Take 1 tablet (50 mg) by mouth daily   Quantity:  90 tablet   Refills:  1       naproxen 500 MG tablet   Commonly known as:  NAPROSYN   Used for:  Primary osteoarthritis of left knee        Dose:  500 mg   Take 1 tablet (500 mg) by mouth 2 times daily (with meals) As needed for knee or hip pain   Quantity:  60 tablet   Refills:  5       omeprazole 40 MG capsule   Commonly known as:  priLOSEC   Used for:  Gastroesophageal reflux disease without esophagitis        Dose:  40 mg   Take 1 capsule (40 mg) by mouth daily Take 30-60 minutes before a meal.   Quantity:  90 capsule   Refills:  3       vitamin E 400 UNIT capsule        Dose:  1 capsule   Take 1 capsule by mouth daily.   Quantity:  100 capsule   Refills:  12            Where to get your medicines      [facility] [address]  [address], [place] [address]    Hours:  [facility] Phone:  [phone]      acetaminophen 325 MG tablet     aspirin 325 MG EC tablet     senna-docusate 8.6-50 MG per tablet         Some of these will need a paper prescription and others can be bought over the counter. Ask your nurse if you have questions.      Bring a paper prescription for each of these medications      oxyCODONE 5 MG IR tablet     traMADol 50 MG tablet

## 2017-04-08 NOTE — PLAN OF CARE
Problem: Goal Outcome Summary  Goal: Goal Outcome Summary  Outcome: Completed Date Met:  04/08/17  MICHI BEAN DISCHARGE NOTE     Patient discharged to home at 12:01 PM via wheel chair. Accompanied by daughter and staff. Discharge instructions reviewed with patient, opportunity offered to ask questions. Prescriptions sent with patient to fill . All belongings sent with patient.     Karma Olivo

## 2017-04-08 NOTE — DISCHARGE SUMMARY
[facility] Orthopedics Discharge Summary                                  [facility]     [place] [id number]   Age: 61 year old  PCP: Bianca Peralta, [phone] 1955     Date of Admission:  4/5/2017  Date of Discharge::  4/8/2017  Discharge Physician:  Zina Ruiz    Code status:  Full Code    Admission Information:  Admission Diagnosis:  degenerative joint disease  Left knee DJD    Post-Operative Day: 3 Days Post-Op     Reason for admission:  The patient was admitted for the following:Procedure(s) (LRB):  ARTHROPLASTY KNEE (Left)    Principal Problem:    Status post total left knee replacement  Active Problems:    Prediabetes    Left knee DJD    Tobacco use disorder    GERD (gastroesophageal reflux disease)    Hyperlipidemia LDL goal <130      Allergies:  Septra [sulfamethoxazole w/trimethoprim] and Influenza vaccine live    Following the procedure noted above the patient was transferred to the post-op floor and started on:    Therapy:  physical therapy and occupational therapy  Anticoagulation Plan: Lovenox inpatient and then  mg daily at discharge  for 42 days  Pain Management: oxycodone, tramadol and tylenol  Weight bearing status: Weight bearing as tolerated     The patient was followed and co-managed by the hospitalist service during the inpatient treatment course  Complications:  None  Consultations:  hospitalist     Pertinent Labs   Lab Results: personally reviewed.     Recent Labs   Lab Test  04/08/17   0620  04/07/17   0618  04/06/17   0620  04/05/17   1052  03/13/17   1018   02/10/15   1636  05/08/14   1622   09/29/11   0825  03/16/10   1400   INR   --    --    --   0.92   --    --    --    --    --    --    --    HGB   --   12.3  11.4*  14.4  13.3   < >  14.0   --    < >  16.5*   --    HCT   --    --    --   42.7  39.4   --   41.3   --    < >  47.8*   --    MCV   --    --    --   87  89   --   89   --    < >  86   --    PLT  140*   --    --   177  157   <  >  172   --    < >  213   --    NA   --    --    --    --   137   --   140  142   < >  142   --    CRP   --    --    --    --    --    --    --    --    --   <5.0  8.9*    < > = values in this interval not displayed.          Discharge Information:  Condition at discharge: Stable  Discharge destination:  Discharged to home with home care     Medications at discharge:  Current Discharge Medication List      START taking these medications    Details   acetaminophen (TYLENOL) 325 MG tablet Take 3 tablets (975 mg) by mouth every 8 hours  Qty: 100 tablet, Refills: 0    Associated Diagnoses: Status post total left knee replacement      oxyCODONE (ROXICODONE) 5 MG IR tablet Take 1-2 tablets (5-10 mg) by mouth every 3 hours as needed for moderate to severe pain  Qty: 40 tablet, Refills: 0    Associated Diagnoses: Status post total left knee replacement      aspirin 325 MG EC tablet Take 1 tablet (325 mg) by mouth daily with food  Qty: 42 tablet, Refills: 0    Associated Diagnoses: Status post total left knee replacement      senna-docusate (SENOKOT-S;PERICOLACE) 8.6-50 MG per tablet Take 1 tablet by mouth At Bedtime  Qty: 100 tablet, Refills: 0    Associated Diagnoses: Status post total left knee replacement         CONTINUE these medications which have CHANGED    Details   traMADol (ULTRAM) 50 MG tablet Take 0.5-1 tablets (25-50 mg) by mouth every 6 hours as needed for moderate pain  Qty: 40 tablet, Refills: 0    Associated Diagnoses: Status post total left knee replacement         CONTINUE these medications which have NOT CHANGED    Details   Ferrous Sulfate (IRON SUPPLEMENT PO) Take 325 mg by mouth every other day      naproxen (NAPROSYN) 500 MG tablet Take 1 tablet (500 mg) by mouth 2 times daily (with meals) As needed for knee or hip pain  Qty: 60 tablet, Refills: 5    Associated Diagnoses: Primary osteoarthritis of left knee      mirabegron (MYRBETRIQ) 50 MG 24 hr tablet Take 1 tablet (50 mg) by mouth daily  Qty: 90  tablet, Refills: 1    Associated Diagnoses: Bladder infection      omeprazole (PRILOSEC) 40 MG capsule Take 1 capsule (40 mg) by mouth daily Take 30-60 minutes before a meal.  Qty: 90 capsule, Refills: 3    Associated Diagnoses: Gastroesophageal reflux disease without esophagitis      cholecalciferol (VITAMIN D) 1000 UNIT tablet Take 1 tablet by mouth daily.      ascorbic acid (VITAMIN C) 500 MG tablet Take 1 tablet by mouth daily.  Qty: 100 tablet, Refills: 12      vitamin E 400 UNIT capsule Take 1 capsule by mouth daily.  Qty: 100 capsule, Refills: 12      CALCIUM 600 MG OR TABS 1 daily      FISH OIL 1000 MG OR CAPS 1 daily                        Follow-Up Care:  Patient should be seen in the office in 14 days by the Orthopedic Surgeon/Physician Assistant.  Call [phone] for appointment or questions.    Zina Ruiz

## 2017-04-10 ENCOUNTER — TELEPHONE (OUTPATIENT)
Dept: FAMILY MEDICINE | Facility: CLINIC | Age: 62
End: 2017-04-10

## 2017-04-10 NOTE — TELEPHONE ENCOUNTER
"ED/Discharge Protocol    \"Hi, my name is Yari Vela, a registered nurse, and I am calling on behalf of 's office at [place].  I am calling to follow up and see how things are going for you after your recent visit.\"    \"I see that you were in the IP, L knee replacement on 4/5/17.   How are you doing now that you are home?\" Ortho appt in 2 weeks [phone]. KPavelRN        "

## 2017-04-10 NOTE — TELEPHONE ENCOUNTER
ED/UC/IP follow up phone call:    RN please call to follow up.    Number of ED visits in past 12 months = 0

## 2017-04-27 ENCOUNTER — HOSPITAL ENCOUNTER (OUTPATIENT)
Dept: PHYSICAL THERAPY | Facility: CLINIC | Age: 62
Setting detail: THERAPIES SERIES
End: 2017-04-27
Attending: ORTHOPAEDIC SURGERY
Payer: COMMERCIAL

## 2017-04-27 PROCEDURE — 97161 PT EVAL LOW COMPLEX 20 MIN: CPT | Mod: GP | Performed by: PHYSICAL THERAPIST

## 2017-04-27 PROCEDURE — 97110 THERAPEUTIC EXERCISES: CPT | Mod: GP | Performed by: PHYSICAL THERAPIST

## 2017-04-27 PROCEDURE — 40000718 ZZHC STATISTIC PT DEPARTMENT ORTHO VISIT: Performed by: PHYSICAL THERAPIST

## 2017-04-27 NOTE — PROGRESS NOTES
Mirian Cruz  1955 Physical Therapy Initial Evaluation  04/27/17 1300   General Information   Type of Visit Initial OP Ortho PT Evaluation   Start of Care Date 04/27/17   Referring Physician Zelalem Mendez    Orders Evaluate and Treat   Date of Order 04/24/17   Insurance Type [employer]   Insurance Comments/Visits Authorized 20   Medical Diagnosis LT TKA   Surgical/Medical history reviewed Yes   Precautions/Limitations no known precautions/limitations   Body Part(s)   Body Part(s) Knee   Presentation and Etiology   Pertinent history of current problem (include personal factors and/or comorbidities that impact the POC) Surgery on April 5, 2017. Had home care. Has been walking without a cane or walker for 1 week. No stairs at home. HEP going well. ROM is at 116. / Comorbidities - Tobacco use disorder, Morbid obesity, Guillan-[place] syndrome, Right TKA    Impairments E. Decreased flexibility   Functional Limitations perform activities of daily living;perform required work activities;perform desired leisure / sports activities   Symptom Location Left knee   How/Where did it occur Other  (From surgery)   Onset date of current episode/exacerbation 04/05/17  (Date of Surgery)   Chronicity Chronic   Pain rating (0-10 point scale) Best (/10);Worst (/10)   Best (/10) 1   Pain quality C. Aching   Frequency of pain/symptoms B. Intermittent   Pain/symptoms are: Worse during the night   Pain/symptoms exacerbated by B. Walking   Progression of symptoms since onset: Improved   Current Level of Function   Patient role/employment history A. Employed   Employment Comments Cook at [employer]   Intrax[place] environment Apartment/condo   Home/community accessibility 0 stairs   Fall Risk Screen   Fall screen completed by PT   Per patient - Fall 2 or more times in past year? No   Per patient - Fall with injury in past year? No   Is patient a fall risk? No   Knee Objective Findings   Side (if bilateral, select both right and left) Left    Integumentary  Mildly swollen around left knee joint   Gait/Locomotion No AD needed. Slightly reduced heel strike and toe off. Difficulty with eccentric control for stair descent   Left Knee Extension AROM 5 degrees short of TKE   Left Knee Flexion PROM 115   Left Knee Flexion Strength 4/5   Left Knee Extension Strength 4/5   Left Hip Abduction Strength 4-/5    Left Hamstring Flexibility Moderately restricted    Left ITB Flexibility Moderately restricted    Planned Therapy Interventions   Planned Therapy Interventions balance training;joint mobilization;gait training;manual therapy;neuromuscular re-education;ROM;strengthening;stretching   Planned Modality Interventions   Planned Modality Interventions Cryotherapy;Hot packs;TENS;Ultrasound   Clinical Impression   Criteria for Skilled Therapeutic Interventions Met yes, treatment indicated   PT Diagnosis Left TKA leading to ROM and strength deficits and difficulty ambulating   Influenced by the following impairments Stiffness, ROM deficitis, strength deficits   Functional limitations due to impairments Difficulty sleeping, stairs   Clinical Presentation Stable/Uncomplicated   Clinical Presentation Rationale 4 comorbidities impacting PT / 1 body system / Stable   Clinical Decision Making (Complexity) Low complexity   Therapy Frequency (1-2 times / week)   Predicted Duration of Therapy Intervention (days/wks) 8 weeks   Risk & Benefits of therapy have been explained Yes   Patient, Family & other staff in agreement with plan of care Yes   Education Assessment   Preferred Learning Style Listening;Reading;Demonstration;Pictures/video   Barriers to Learning No barriers   ORTHO GOALS   PT Ortho Eval Goals 1;2;3;4   Ortho Goal 1   Goal Identifier HEP   Goal Description Pt will be independent in HEP in order to achieve and maintain long term treatment goals.   Target Date 05/11/17   Ortho Goal 2   Goal Identifier Stairs   Goal Description Pt will be able to ascend and descend  1 flight of stairs with good eccentric control and a reciprical gait pattern and handrail assist safely.   Target Date 06/22/17   Ortho Goal 3   Goal Identifier Sleeping   Goal Description Pt will be able to sleep through the night without waking up due to knee pain.   Target Date 06/22/17   Total Evaluation Time   Total Evaluation Time 15     Brenden Sarah, PT, DPT

## 2017-05-04 ENCOUNTER — HOSPITAL ENCOUNTER (OUTPATIENT)
Dept: PHYSICAL THERAPY | Facility: CLINIC | Age: 62
Setting detail: THERAPIES SERIES
End: 2017-05-04
Attending: ORTHOPAEDIC SURGERY
Payer: COMMERCIAL

## 2017-05-04 PROCEDURE — 40000718 ZZHC STATISTIC PT DEPARTMENT ORTHO VISIT: Performed by: PHYSICAL THERAPIST

## 2017-05-04 PROCEDURE — 97110 THERAPEUTIC EXERCISES: CPT | Mod: GP | Performed by: PHYSICAL THERAPIST

## 2017-05-08 ENCOUNTER — HOSPITAL ENCOUNTER (OUTPATIENT)
Dept: PHYSICAL THERAPY | Facility: CLINIC | Age: 62
Setting detail: THERAPIES SERIES
End: 2017-05-08
Attending: ORTHOPAEDIC SURGERY
Payer: COMMERCIAL

## 2017-05-08 DIAGNOSIS — N30.90 BLADDER INFECTION: ICD-10-CM

## 2017-05-08 PROCEDURE — 97110 THERAPEUTIC EXERCISES: CPT | Mod: GP | Performed by: PHYSICAL THERAPIST

## 2017-05-08 PROCEDURE — 40000718 ZZHC STATISTIC PT DEPARTMENT ORTHO VISIT: Performed by: PHYSICAL THERAPIST

## 2017-05-09 RX ORDER — MIRABEGRON 50 MG/1
50 TABLET, EXTENDED RELEASE ORAL DAILY
Qty: 90 TABLET | Refills: 1 | Status: CANCELLED | OUTPATIENT
Start: 2017-05-09

## 2017-05-09 NOTE — TELEPHONE ENCOUNTER
Routing refill request to provider for review/approval because:  Drug not on the [facility] refill protocol      Dx: Bladder infection

## 2017-05-11 ENCOUNTER — HOSPITAL ENCOUNTER (OUTPATIENT)
Dept: PHYSICAL THERAPY | Facility: CLINIC | Age: 62
Setting detail: THERAPIES SERIES
End: 2017-05-11
Attending: ORTHOPAEDIC SURGERY
Payer: COMMERCIAL

## 2017-05-11 PROCEDURE — 40000718 ZZHC STATISTIC PT DEPARTMENT ORTHO VISIT: Performed by: PHYSICAL THERAPIST

## 2017-05-11 PROCEDURE — 97110 THERAPEUTIC EXERCISES: CPT | Mod: GP | Performed by: PHYSICAL THERAPIST

## 2017-05-18 ENCOUNTER — HOSPITAL ENCOUNTER (OUTPATIENT)
Dept: PHYSICAL THERAPY | Facility: CLINIC | Age: 62
Setting detail: THERAPIES SERIES
End: 2017-05-18
Attending: ORTHOPAEDIC SURGERY
Payer: COMMERCIAL

## 2017-05-18 PROCEDURE — 97110 THERAPEUTIC EXERCISES: CPT | Mod: GP | Performed by: PHYSICAL THERAPIST

## 2017-05-18 PROCEDURE — 40000718 ZZHC STATISTIC PT DEPARTMENT ORTHO VISIT: Performed by: PHYSICAL THERAPIST

## 2017-05-18 NOTE — PROGRESS NOTES
Mirian Cruz  1955  Diagnosis - Left TKA Physical Therapy Discharge Note  05/18/17 1000   Signing Clinician's Name / Credentials   Signing clinician's name / credentials Brenden Sarah PT, DPT   Session Number   Session Number 5 (Start of Care Date - 4/27/2017)   Progress Note/Recertification   Progress Note Due Date 06/22/17   Adult Goals   PT Ortho Eval Goals 1;2;3;4   Ortho Goal 1   Goal Identifier HEP   Goal Description Pt will be independent in HEP in order to achieve and maintain long term treatment goals.   Target Date 05/11/17   Date Met 05/11/17   Ortho Goal 2   Goal Identifier Stairs   Goal Description Pt will be able to ascend and descend 1 flight of stairs with good eccentric control and a reciprical gait pattern and handrail assist safely.   Target Date 06/22/17   Date Met 05/18/17   Ortho Goal 3   Goal Identifier Sleeping   Goal Description Pt will be able to sleep through the night without waking up due to knee pain.   Target Date 06/22/17   Date Met 05/18/17   Subjective Report   Subjective Report Saw Dr. Mendez and was told that everything looked good and she could go back to work.    Objective Measures   Objective Measures Objective Measure 1;Objective Measure 2   Objective Measure 1   Objective Measure AROM   Details 0-125   Objective Measure 2   Objective Measure Strength   Details Knee flexion - 5/5 / Knee extension - 5/5 / Hip abduction - 5/5   Objective Measure 3   Objective Measure Stairs   Details Reciprical gait pattern with handrail assist   Treatment Interventions   Interventions Therapeutic Procedure/Exercise;Manual Therapy   Therapeutic Procedure/exercise   Minutes 27   Skilled Intervention ROM and strengthening exercise instruction /    Patient Response Improved ROM   Treatment Detail Bike x5 minutes (no holes showing on seat) / AAROM knee flexion x7 with 10 second holds / AAROM for knee extension x10 with 10 second holds / Stool Push-Pull x8 lengths / Mini Squats 2x15 at  sink / Mini Lunges x20 B /     Plan   Home program AAROM knee flexion x10 with 10 second holds / Quad sets x10 with 5 second holds / AAROM for knee extension x10 with 10 second holds / SLR in supine x15 / Standing hip abduction x15 B / Standing hip extension x15 B / Mini Squats x15 at sink   Plan for next session Progress ROM and strengthening as tolerated   Comments   Comments Pt has done well in physical therapy and has met all goals. Pt's ROM and LE strength is adequate to perform all ADL's. Pt has been cleared to return to work by surgeon. Pt will be discharged to [facility] at this time. Pt is in agreement with this plan.   Total Session Time   Total Session Time 27 (2TE)     Referring Physician - Zelalem Mendez

## 2017-05-22 DIAGNOSIS — N30.90 BLADDER INFECTION: ICD-10-CM

## 2017-05-22 RX ORDER — MIRABEGRON 50 MG/1
50 TABLET, EXTENDED RELEASE ORAL DAILY
Qty: 90 TABLET | Refills: 1 | Status: CANCELLED | OUTPATIENT
Start: 2017-05-22

## 2017-05-23 ENCOUNTER — OFFICE VISIT (OUTPATIENT)
Dept: UROLOGY | Facility: CLINIC | Age: 62
End: 2017-05-23
Payer: COMMERCIAL

## 2017-05-23 VITALS — DIASTOLIC BLOOD PRESSURE: 76 MMHG | RESPIRATION RATE: 18 BRPM | HEART RATE: 93 BPM | SYSTOLIC BLOOD PRESSURE: 135 MMHG

## 2017-05-23 DIAGNOSIS — N39.3 URINARY, INCONTINENCE, STRESS FEMALE: Primary | ICD-10-CM

## 2017-05-23 DIAGNOSIS — N32.81 OAB (OVERACTIVE BLADDER): ICD-10-CM

## 2017-05-23 LAB
ALBUMIN UR-MCNC: NEGATIVE MG/DL
APPEARANCE UR: ABNORMAL
BILIRUB UR QL STRIP: ABNORMAL
COLOR UR AUTO: ABNORMAL
GLUCOSE UR STRIP-MCNC: NEGATIVE MG/DL
HGB UR QL STRIP: NEGATIVE
KETONES UR STRIP-MCNC: NEGATIVE MG/DL
LEUKOCYTE ESTERASE UR QL STRIP: NEGATIVE
NITRATE UR QL: NEGATIVE
NON-SQ EPI CELLS #/AREA URNS LPF: ABNORMAL /LPF
PH UR STRIP: 5.5 PH (ref 5–7)
RBC #/AREA URNS AUTO: ABNORMAL /HPF (ref 0–2)
SP GR UR STRIP: 1.02 (ref 1–1.03)
URATE CRY #/AREA URNS HPF: ABNORMAL /HPF
URN SPEC COLLECT METH UR: ABNORMAL
UROBILINOGEN UR STRIP-ACNC: 1 EU/DL (ref 0.2–1)
WBC #/AREA URNS AUTO: ABNORMAL /HPF (ref 0–2)

## 2017-05-23 PROCEDURE — 99213 OFFICE O/P EST LOW 20 MIN: CPT | Performed by: UROLOGY

## 2017-05-23 PROCEDURE — 81001 URINALYSIS AUTO W/SCOPE: CPT | Performed by: UROLOGY

## 2017-05-23 RX ORDER — MIRABEGRON 50 MG/1
50 TABLET, EXTENDED RELEASE ORAL DAILY
Qty: 90 TABLET | Refills: 2 | Status: SHIPPED | OUTPATIENT
Start: 2017-05-23 | End: 2019-02-05

## 2017-05-23 NOTE — MR AVS SNAPSHOT
"              After Visit Summary   2017    Mirian Cruz    MRN: [id number]           Patient Information     Date Of Birth          1955        Visit Information        Provider Department      2017 11:30 AM Doni Benavides MD [facility]        Today's Diagnoses     Urinary, incontinence, stress female    -  1    OAB (overactive bladder)           Follow-ups after your visit        Follow-up notes from your care team     Return in about 6 months (around 2017).      Who to contact     If you have questions or need follow up information about today's clinic visit or your schedule please contact [facility] directly at [phone].  Normal or non-critical lab and imaging results will be communicated to you by MyChart, letter or phone within 4 business days after the clinic has received the results. If you do not hear from us within 7 days, please contact the clinic through MyChart or phone. If you have a critical or abnormal lab result, we will notify you by phone as soon as possible.  Submit refill requests through [employer] or call your pharmacy and they will forward the refill request to us. Please allow 3 business days for your refill to be completed.          Additional Information About Your Visit        MyChart Information     [employer] lets you send messages to your doctor, view your test results, renew your prescriptions, schedule appointments and more. To sign up, go to [email or web address] . Click on \"Log in\" on the left side of the screen, which will take you to the Welcome page. Then click on \"Sign up Now\" on the right side of the page.     You will be asked to enter the access code listed below, as well as some personal information. Please follow the directions to create your username and password.     Your access code is: [email or web address]  Expires: 2017 10:12 AM     Your access code will  in 90 days. If you need help or a new code, please " call your [facility] clinic or [phone].        Care EveryWhere ID     This is your Care EveryWhere ID. This could be used by other organizations to access your [facility] medical records  [id number]        Your Vitals Were     Pulse Respirations                93 18           Blood Pressure from Last 3 Encounters:   05/23/17 135/76   04/08/17 119/88   03/13/17 132/80    Weight from Last 3 Encounters:   04/05/17 235 lb (106.6 kg)   03/13/17 234 lb 14.4 oz (106.5 kg)   01/24/17 239 lb 12.8 oz (108.8 kg)              We Performed the Following     *UA reflex to Microscopic and Culture ([place] and [facility] (except [place] and [place])     Urine Microscopic          Today's Medication Changes          These changes are accurate as of: 5/23/17 12:01 PM.  If you have any questions, ask your nurse or doctor.               These medicines have changed or have updated prescriptions.        Dose/Directions    * mirabegron 50 MG 24 hr tablet   Commonly known as:  MYRBETRIQ   This may have changed:  Another medication with the same name was added. Make sure you understand how and when to take each.   Used for:  Bladder infection   Changed by:  Doni Benavides MD        Dose:  50 mg   Take 1 tablet (50 mg) by mouth daily   Quantity:  90 tablet   Refills:  1       * mirabegron 50 MG 24 hr tablet   Commonly known as:  MYRBETRIQ   This may have changed:  You were already taking a medication with the same name, and this prescription was added. Make sure you understand how and when to take each.   Used for:  OAB (overactive bladder)   Changed by:  Doni Benavides MD        Dose:  50 mg   Take 1 tablet (50 mg) by mouth daily   Quantity:  90 tablet   Refills:  2       * Notice:  This list has 2 medication(s) that are the same as other medications prescribed for you. Read the directions carefully, and ask your doctor or other care provider to review them with you.         Where to get your medicines      Some of  these will need a paper prescription and others can be bought over the counter.  Ask your nurse if you have questions.     Bring a paper prescription for each of these medications     mirabegron 50 MG 24 hr tablet                Primary Care Provider Office Phone # Fax #    Bianca Chelsie Peralta [phone]       [facility] [address]  [facility] [address]        Thank you!     Thank you for choosing [facility]  for your care. Our goal is always to provide you with excellent care. Hearing back from our patients is one way we can continue to improve our services. Please take a few minutes to complete the written survey that you may receive in the mail after your visit with us. Thank you!             Your Updated Medication List - Protect others around you: Learn how to safely use, store and throw away your medicines at www.disposemymeds.org.          This list is accurate as of: 5/23/17 12:01 PM.  Always use your most recent med list.                   Brand Name Dispense Instructions for use    acetaminophen 325 MG tablet    TYLENOL    100 tablet    Take 3 tablets (975 mg) by mouth every 8 hours       ascorbic acid 500 MG tablet    VITAMIN C    100 tablet    Take 1 tablet by mouth daily.       aspirin 325 MG EC tablet     42 tablet    Take 1 tablet (325 mg) by mouth daily with food       calcium 600 MG tablet      1 daily       cholecalciferol 1000 UNIT tablet    vitamin D     Take 1 tablet by mouth daily.       fish oil-omega-3 fatty acids 1000 MG capsule      1 daily       IRON SUPPLEMENT PO      Take 325 mg by mouth every other day       * mirabegron 50 MG 24 hr tablet    MYRBETRIQ    90 tablet    Take 1 tablet (50 mg) by mouth daily       * mirabegron 50 MG 24 hr tablet    MYRBETRIQ    90 tablet    Take 1 tablet (50 mg) by mouth daily       naproxen 500 MG tablet    NAPROSYN    60 tablet    Take 1 tablet (500 mg) by mouth 2 times daily (with meals) As needed for  knee or hip pain       omeprazole 40 MG capsule    priLOSEC    90 capsule    Take 1 capsule (40 mg) by mouth daily Take 30-60 minutes before a meal.       senna-docusate 8.6-50 MG per tablet    SENOKOT-S;PERICOLACE    100 tablet    Take 1 tablet by mouth At Bedtime       vitamin E 400 UNIT capsule     100 capsule    Take 1 capsule by mouth daily.       * Notice:  This list has 2 medication(s) that are the same as other medications prescribed for you. Read the directions carefully, and ask your doctor or other care provider to review them with you.

## 2017-05-23 NOTE — PROGRESS NOTES
F/u rec UTI, OAB wet, modest bladder capacity, s/p TVT, Myrbetriq 50      Compliant.     Reports 2 UTI's since last visit:  1/24/17 E coli  3/13/17 E coli  Feels that they were both related to a certain type of wipe.      Present denies dysuria, gross hematuria, frequency. noc x 2.     Wears pad day and nite; one pad per day, only a couple of drops at nite.    BM's are satisfactory.     Moderate fluids and caffeine.    Is quitting smoking; now 2 weeks without tobacco.           Current Outpatient Prescriptions   Medication     acetaminophen (TYLENOL) 325 MG tablet     aspirin 325 MG EC tablet     senna-docusate (SENOKOT-S;PERICOLACE) 8.6-50 MG per tablet     Ferrous Sulfate (IRON SUPPLEMENT PO)     naproxen (NAPROSYN) 500 MG tablet     mirabegron (MYRBETRIQ) 50 MG 24 hr tablet     omeprazole (PRILOSEC) 40 MG capsule     cholecalciferol (VITAMIN D) 1000 UNIT tablet     ascorbic acid (VITAMIN C) 500 MG tablet     vitamin E 400 UNIT capsule     CALCIUM 600 MG OR TABS     FISH OIL 1000 MG OR CAPS     No current facility-administered medications for this visit.          Results for orders placed or performed in visit on 05/23/17   *UA reflex to Microscopic and Culture ([place] and [facility] (except [place] and [place])   Result Value Ref Range    Color Urine Rachael     Appearance Urine Slightly Cloudy     Glucose Urine Negative NEG mg/dL    Bilirubin Urine (A) NEG     Small  This is an unconfirmed screening test result. A positive result may be false.      Ketones Urine Negative NEG mg/dL    Specific Gravity Urine 1.025 1.003 - 1.035    Blood Urine Negative NEG    pH Urine 5.5 5.0 - 7.0 pH    Protein Albumin Urine Negative NEG mg/dL    Urobilinogen Urine 1.0 0.2 - 1.0 EU/dL    Nitrite Urine Negative NEG    Leukocyte Esterase Urine Negative NEG    Source Midstream Urine    Urine Microscopic   Result Value Ref Range    WBC Urine O - 2 0 - 2 /HPF    RBC Urine O - 2 0 - 2 /HPF    Squamous Epithelial /LPF Urine Few  FEW /LPF    Uric Acid Crystals Few (A) NEG /HPF         IMP:  1. OAB, stable  2. Several recent UTI's, resolved      PLAN:  1. Discussed situation with patient in detail.  2. OK to continue Myrbetriq for now; discussed option of Botox at some point  3. 15 minutes spent with patient, more than 50% spent in counseling and coordination of care for OAB/UTI.  4. F/u 6 mos; if no infections will consider Botox in more detail (pt quite interested)  5. Copy N Fabian

## 2017-05-23 NOTE — NURSING NOTE
"Chief Complaint   Patient presents with     RECHECK     medication       Initial /76 (BP Location: Left arm, Patient Position: Chair, Cuff Size: Adult Large)  Pulse 93  Resp 18 Estimated body mass index is 40.34 kg/(m^2) as calculated from the following:    Height as of 4/5/17: 5' 4\" (1.626 m).    Weight as of 4/5/17: 235 lb (106.6 kg).  Medication Reconciliation: complete     Michael De La Vega CMA      "

## 2017-08-03 ENCOUNTER — TELEPHONE (OUTPATIENT)
Dept: FAMILY MEDICINE | Facility: CLINIC | Age: 62
End: 2017-08-03

## 2017-08-03 NOTE — LETTER
[facility]  [address]  [facility] [address]  [phone]      September 18, 2017      Mirian Cruz  [id number] [place] [id number]    Dear Mirian,    I care about your health and have reviewed your health plan. I have reviewed your medical conditions, medication list, and lab results and am making recommendations based on this review, to better manage your health.    You are in particular need of attention regarding:  -Colon Cancer Screening    I am recommending that you:  {recommendations:-schedule a COLONOSCOPY to look for colon cancer (due every 10 years or 5 years in higher risk situations.)   Colon cancer is now the second leading cause of death in the [place] for both men and women and there are over 130,000 new cases and 50,000 deaths per year from colon cancer.  Colonoscopies can prevent 90-95% of these deaths.  Problem lesions can be removed before they ever become cancer.  This test is not only looking for cancer, but also getting rid of precancerious lesions.  If you do not wish to do a colonoscopy or cannot afford to do one, at this time, there is another option. It is called a FIT test or Fecal Immunochemical Occult Blood Test (take home stool sample kit).  It does not replace the colonoscopy for colorectal cancer screening, but it can detect hidden bleeding in the lower colon.  It does need to be repeated every year and if a positive result is obtained, you would be referred for a colonoscopy.  If you have completed either one of these tests at another facility, please have the records sent to our clinic so that we can best coordinate your care.    Here is a list of Health Maintenance topics that are due now or due soon:  Health Maintenance Due   Topic Date Due     HEPATITIS C SCREENING  05/05/1975     COLON CANCER SCREEN (SYSTEM ASSIGNED)  05/05/2007     INFLUENZA VACCINE (SYSTEM ASSIGNED)  09/01/2017       Please  call us at [phone] (or use [employer]) to address the above recommendations.     Thank you for trusting [facility] and we appreciate the opportunity to serve you.  We look forward to supporting your healthcare needs in the future.    Healthy Regards,    [facility] Team

## 2017-08-03 NOTE — TELEPHONE ENCOUNTER
Panel Management Review      Patient has the following on her problem list: None      Composite cancer screening  Chart review shows that this patient is due/due soon for the following Colonoscopy  Summary:    Patient is due/failing the following:   COLONOSCOPY    Action needed:   Patient needs referral/order: colonoscopy    Type of outreach:    Phone, left message for patient to call back.     Questions for provider review:    None                                                                                                                                    Leonila Woodall [facility]     Chart routed to Care Team .

## 2017-08-04 DIAGNOSIS — K21.9 GASTROESOPHAGEAL REFLUX DISEASE WITHOUT ESOPHAGITIS: ICD-10-CM

## 2017-08-04 NOTE — TELEPHONE ENCOUNTER
Omeprazole      Last Written Prescription Date: 08/08/2016  Last Fill Quantity: 90,  # refills: 3   Last Office Visit with [facility] [place] or [facility] prescribing provider: 03/13/2017      Anam SERRANO)

## 2017-08-07 RX ORDER — OMEPRAZOLE 40 MG/1
40 CAPSULE, DELAYED RELEASE ORAL DAILY
Qty: 90 CAPSULE | Refills: 2 | Status: SHIPPED | OUTPATIENT
Start: 2017-08-07 | End: 2018-05-24

## 2017-10-23 ENCOUNTER — OFFICE VISIT (OUTPATIENT)
Dept: FAMILY MEDICINE | Facility: CLINIC | Age: 62
End: 2017-10-23
Payer: COMMERCIAL

## 2017-10-23 VITALS
TEMPERATURE: 98.8 F | HEIGHT: 64 IN | BODY MASS INDEX: 41.15 KG/M2 | SYSTOLIC BLOOD PRESSURE: 134 MMHG | DIASTOLIC BLOOD PRESSURE: 77 MMHG | WEIGHT: 241 LBS | RESPIRATION RATE: 16 BRPM | HEART RATE: 81 BPM

## 2017-10-23 DIAGNOSIS — R82.90 NONSPECIFIC FINDING ON EXAMINATION OF URINE: ICD-10-CM

## 2017-10-23 DIAGNOSIS — Z12.11 SPECIAL SCREENING FOR MALIGNANT NEOPLASMS, COLON: ICD-10-CM

## 2017-10-23 DIAGNOSIS — R30.0 DYSURIA: ICD-10-CM

## 2017-10-23 DIAGNOSIS — N39.0 URINARY TRACT INFECTION WITHOUT HEMATURIA, SITE UNSPECIFIED: Primary | ICD-10-CM

## 2017-10-23 DIAGNOSIS — B35.1 ONYCHOMYCOSIS: ICD-10-CM

## 2017-10-23 LAB
ALBUMIN UR-MCNC: ABNORMAL MG/DL
APPEARANCE UR: CLEAR
BACTERIA #/AREA URNS HPF: ABNORMAL /HPF
BILIRUB UR QL STRIP: NEGATIVE
COLOR UR AUTO: YELLOW
GLUCOSE UR STRIP-MCNC: NEGATIVE MG/DL
HGB UR QL STRIP: ABNORMAL
KETONES UR STRIP-MCNC: NEGATIVE MG/DL
LEUKOCYTE ESTERASE UR QL STRIP: ABNORMAL
NITRATE UR QL: NEGATIVE
NON-SQ EPI CELLS #/AREA URNS LPF: ABNORMAL /LPF
PH UR STRIP: 6 PH (ref 5–7)
RBC #/AREA URNS AUTO: ABNORMAL /HPF
SOURCE: ABNORMAL
SP GR UR STRIP: 1.02 (ref 1–1.03)
UROBILINOGEN UR STRIP-ACNC: 0.2 EU/DL (ref 0.2–1)
WBC #/AREA URNS AUTO: ABNORMAL /HPF

## 2017-10-23 PROCEDURE — 87086 URINE CULTURE/COLONY COUNT: CPT | Performed by: NURSE PRACTITIONER

## 2017-10-23 PROCEDURE — 87088 URINE BACTERIA CULTURE: CPT | Performed by: NURSE PRACTITIONER

## 2017-10-23 PROCEDURE — 81001 URINALYSIS AUTO W/SCOPE: CPT | Performed by: NURSE PRACTITIONER

## 2017-10-23 PROCEDURE — 99214 OFFICE O/P EST MOD 30 MIN: CPT | Performed by: NURSE PRACTITIONER

## 2017-10-23 PROCEDURE — 87186 SC STD MICRODIL/AGAR DIL: CPT | Performed by: NURSE PRACTITIONER

## 2017-10-23 RX ORDER — NITROFURANTOIN 25; 75 MG/1; MG/1
100 CAPSULE ORAL 2 TIMES DAILY
Qty: 14 CAPSULE | Refills: 0 | Status: SHIPPED | OUTPATIENT
Start: 2017-10-23 | End: 2017-12-26

## 2017-10-23 NOTE — PATIENT INSTRUCTIONS
Return in April for regular exam, come fasting for labs and will schedule mammogram.  Do your FIT kit.  Take the antibiotic for UTI and follow up with urology.  Follow up if symptoms persist or worsen and as needed.        Thank you for choosing [facility].  You may be receiving a survey in the mail from Rosalba Puente regarding your visit today.  Please take a few minutes to complete and return the survey to let us know how we are doing.      Our Clinic hours are:  Mondays    7:20 am - 7 pm  Tues -  Fri  7:20 am - 5 pm    Clinic Phone: [phone]    The clinic lab opens at 7:30 am Mon - Fri and appointments are required.    [facility]. [phone]  Monday-Thursday 8 am - 7pm  Tues/Wed/Fri 8 am - 5:30 pm

## 2017-10-23 NOTE — PROGRESS NOTES
SUBJECTIVE:   Mirian Cruz is a 62 year old female who presents to clinic today for the following health issues:      URINARY TRACT SYMPTOMS      Duration: 1-2 weeks    Description  dysuria, frequency, urgency, odor and nocturia x 2-3    Intensity:  moderate    Accompanying signs and symptoms:  Fever/chills: no   Flank pain no   Nausea and vomiting: no   Vaginal symptoms: none  Abdominal/Pelvic Pain: no     History  History of frequent UTI's: YES  History of kidney stones: no   Sexually Active: no   Possibility of pregnancy: No    Precipitating or alleviating factors: was going to have botox in her bladder for over active bladder but needed to be UTI free for 6 months and was at her 6 months and now she thinks she has a bladder infection.    Therapies tried and outcome: increase fluid intake   Outcome: na            She would like her like her both big toe nails, she thinks she has a fungus.    Problem list and histories reviewed & adjusted, as indicated.  Additional history:   She has had a lot of bladder problems and was hoping to have botox next. She hasn't had a UTI for six months.      She states her big toe nails are thick, and discolored. No pain, swelling or discharge present.        Patient Active Problem List   Diagnosis     Tobacco use disorder     GERD (gastroesophageal reflux disease)     Morbid obesity (H)     Hyperlipidemia LDL goal <130     GBS (Guillain-[place] syndrome) (H)     Advanced directives, counseling/discussion     Sleep related leg cramps     Urinary, incontinence, stress female     Urethral hypermobility     OA (osteoarthritis) of knee     Status post total right knee replacement     Prediabetes     Status post total left knee replacement     Left knee DJD     Past Surgical History:   Procedure Laterality Date     ARTHROPLASTY KNEE Right 2/18/2015    Procedure: ARTHROPLASTY KNEE;  Surgeon: Zelalem Mendez MD;  Location: [facility]     ARTHROPLASTY KNEE Left 4/5/2017    Procedure:  ARTHROPLASTY KNEE;  Surgeon: Zelalem Mendez MD;  Location: [facility]     BUNIONECTOMY CHEVRON AND REUBEN, COMBINED  3/25/2011    COMBINED BUNIONECTOMY CHEVRON AND REUBEN performed by TRISTON SEWELL at [facility]     CYSTOSCOPY  5/20/2013    Procedure: CYSTOSCOPY;;  Surgeon: Adan Morrison MD;  Location: [facility]     REMOVE HARDWARE FOOT  2/15/2012    Procedure:REMOVE HARDWARE FOOT; Removal of hardware left foot; Surgeon:TRISTON SEWELL; Location:[facility]     SLING TRANSVAGINAL  5/20/2013    Procedure: SLING TRANSVAGINAL;  Transvaginal taping, cystoscopy;  Surgeon: Adan Morrison MD;  Location: [facility]     TUBAL LIGATION         Social History   Substance Use Topics     Smoking status: Former Smoker     Types: Cigarettes     Quit date: 7/18/2015     Smokeless tobacco: Never Used     Alcohol use 0.0 oz/week     0 Standard drinks or equivalent per week      Comment: occ     Family History   Problem Relation Age of Onset     DIABETES Mother      GASTROINTESTINAL DISEASE Father      colon polyps     DIABETES Maternal Grandmother          Current Outpatient Prescriptions   Medication Sig Dispense Refill     nitroFURantoin, macrocrystal-monohydrate, (MACROBID) 100 MG capsule Take 1 capsule (100 mg) by mouth 2 times daily 14 capsule 0     omeprazole (PRILOSEC) 40 MG capsule Take 1 capsule (40 mg) by mouth daily Take 30-60 minutes before a meal. 90 capsule 2     mirabegron (MYRBETRIQ) 50 MG 24 hr tablet Take 1 tablet (50 mg) by mouth daily 90 tablet 2     Ferrous Sulfate (IRON SUPPLEMENT PO) Take 325 mg by mouth every other day       naproxen (NAPROSYN) 500 MG tablet Take 1 tablet (500 mg) by mouth 2 times daily (with meals) As needed for knee or hip pain 60 tablet 5     mirabegron (MYRBETRIQ) 50 MG 24 hr tablet Take 1 tablet (50 mg) by mouth daily 90 tablet 1     cholecalciferol (VITAMIN D) 1000 UNIT tablet Take 1 tablet by mouth daily.       ascorbic acid (VITAMIN C) 500 MG tablet Take 1 tablet by mouth  "daily. 100 tablet 12     vitamin E 400 UNIT capsule Take 1 capsule by mouth daily. 100 capsule 12     CALCIUM 600 MG OR TABS 1 daily       FISH OIL 1000 MG OR CAPS 1 daily       aspirin 325 MG EC tablet Take 1 tablet (325 mg) by mouth daily with food (Patient not taking: Reported on 10/23/2017) 42 tablet 0     senna-docusate (SENOKOT-S;PERICOLACE) 8.6-50 MG per tablet Take 1 tablet by mouth At Bedtime (Patient not taking: Reported on 10/23/2017) 100 tablet 0     Allergies   Allergen Reactions     Septra [Sulfamethoxazole W/Trimethoprim] Other (See Comments)     Stomatitis with mouth ulcerations     Influenza Vaccine Live      History of Guillain-[place]         Reviewed and updated as needed this visit by clinical staffTobacco  Allergies  Med Hx  Surg Hx  Fam Hx  Soc Hx      Reviewed and updated as needed this visit by Provider          ROS: 10 point ROS neg other than the symptoms noted above in the HPI.    OBJECTIVE:     /77 (BP Location: Right arm, Patient Position: Chair, Cuff Size: Adult Large)  Pulse 81  Temp 98.8  F (37.1  C) (Oral)  Resp 16  Ht 5' 4\" (1.626 m)  Wt 241 lb (109.3 kg)  BMI 41.37 kg/m2  Body mass index is 41.37 kg/(m^2).  GENERAL: healthy, alert and no distress  HENT: pharynx without erythema  NECK: no adenopathy, no asymmetry  RESP: lungs clear to auscultation - no rales, rhonchi or wheezes  CV: regular rate and rhythm, normal S1 S2, no S3 or S4, no murmur  ABDOMEN: soft, nontender, no hepatosplenomegaly, no masses and bowel sounds normal, no CVA tenderness  MS: no gross musculoskeletal defects noted, thickness and mild distal discoloration of both great toenails, no active infections      Diagnostic Test Results:  Results for orders placed or performed in visit on 10/23/17 (from the past 24 hour(s))   UA reflex to Microscopic and Culture   Result Value Ref Range    Color Urine Yellow     Appearance Urine Clear     Glucose Urine Negative NEG^Negative mg/dL    Bilirubin Urine " Negative NEG^Negative    Ketones Urine Negative NEG^Negative mg/dL    Specific Gravity Urine 1.020 1.003 - 1.035    Blood Urine Moderate (A) NEG^Negative    pH Urine 6.0 5.0 - 7.0 pH    Protein Albumin Urine Trace (A) NEG^Negative mg/dL    Urobilinogen Urine 0.2 0.2 - 1.0 EU/dL    Nitrite Urine Negative NEG^Negative    Leukocyte Esterase Urine Moderate (A) NEG^Negative    Source Midstream Urine    Urine Microscopic   Result Value Ref Range    WBC Urine 25-50 (A) OTO2^O - 2 /HPF    RBC Urine 25-50 (A) OTO2^O - 2 /HPF    Squamous Epithelial /LPF Urine Few FEW^Few /LPF    Bacteria Urine Few (A) NEG^Negative /HPF       ASSESSMENT/PLAN:             1. Urinary tract infection without hematuria, site unspecified    - nitroFURantoin, macrocrystal-monohydrate, (MACROBID) 100 MG capsule; Take 1 capsule (100 mg) by mouth 2 times daily  Dispense: 14 capsule; Refill: 0  Discussed how to take the medication(s), expected outcomes, potential side effects.    2. Dysuria    - UA reflex to Microscopic and Culture  - Urine Microscopic    3. Special screening for malignant neoplasms, colon    - Fecal colorectal cancer screen (FIT); Future    4. Nonspecific finding on examination of urine    - Urine Culture Aerobic Bacterial    5. Onychomycosis    This is very mild. Reviewed natural discoloration, thickness of toe nails with age. She will consider tea tree oil and soaking in half bleach water.        See Patient Instructions  Patient Instructions   Return in April for regular exam, come fasting for labs and will schedule mammogram.  Do your FIT kit.  Take the antibiotic for UTI and follow up with urology.  Follow up if symptoms persist or worsen and as needed.        Thank you for choosing [facility].  You may be receiving a survey in the mail from [employer] regarding your visit today.  Please take a few minutes to complete and return the survey to let us know how we are doing.      Our Clinic hours are:  Mondays    7:20 am - 7  pm  Tues -  Fri  7:20 am - 5 pm    Clinic Phone: [phone]    The clinic lab opens at 7:30 am Mon - Fri and appointments are required.    [facility]  Ph. [phone]  Monday-Thursday 8 am - 7pm  Tues/Wed/Fri 8 am - 5:30 pm             ANNALEE Polo [facility]

## 2017-10-23 NOTE — NURSING NOTE
"Chief Complaint   Patient presents with     Urinary Problem       Initial /77 (BP Location: Right arm, Patient Position: Chair, Cuff Size: Adult Large)  Pulse 81  Temp 98.8  F (37.1  C) (Oral)  Resp 16  Ht 5' 4\" (1.626 m)  Wt 241 lb (109.3 kg)  BMI 41.37 kg/m2 Estimated body mass index is 41.37 kg/(m^2) as calculated from the following:    Height as of this encounter: 5' 4\" (1.626 m).    Weight as of this encounter: 241 lb (109.3 kg).  Medication Reconciliation: complete  "

## 2017-10-23 NOTE — MR AVS SNAPSHOT
After Visit Summary   10/23/2017    Mirian Cruz    MRN: [id number]           Patient Information     Date Of Birth          1955        Visit Information        Provider Department      10/23/2017 9:40 AM Birdie Kraus APRN CNP [facility]        Today's Diagnoses     Urinary tract infection without hematuria, site unspecified    -  1    Dysuria        Special screening for malignant neoplasms, colon        Nonspecific finding on examination of urine        Onychomycosis          Care Instructions    Return in April for regular exam, come fasting for labs and will schedule mammogram.  Do your FIT kit.  Take the antibiotic for UTI and follow up with urology.  Follow up if symptoms persist or worsen and as needed.        Thank you for choosing [facility].  You may be receiving a survey in the mail from [employer] regarding your visit today.  Please take a few minutes to complete and return the survey to let us know how we are doing.      Our Clinic hours are:  Mondays    7:20 am - 7 pm  Tues -  Fri  7:20 am - 5 pm    Clinic Phone: [phone]    The clinic lab opens at 7:30 am Mon - Fri and appointments are required.    [facility]  Ph. [phone]  Monday-Thursday 8 am - 7pm  Tues/Wed/Fri 8 am - 5:30 pm                 Follow-ups after your visit        Future tests that were ordered for you today     Open Future Orders        Priority Expected Expires Ordered    Fecal colorectal cancer screen (FIT) Routine 11/13/2017 1/15/2018 10/23/2017            Who to contact     If you have questions or need follow up information about today's clinic visit or your schedule please contact [facility] directly at [phone].  Normal or non-critical lab and imaging results will be communicated to you by MyChart, letter or phone within 4 business days after the clinic has received the results. If you do not hear from us within 7 days,  "please contact the clinic through [employer] or phone. If you have a critical or abnormal lab result, we will notify you by phone as soon as possible.  Submit refill requests through [employer] or call your pharmacy and they will forward the refill request to us. Please allow 3 business days for your refill to be completed.          Additional Information About Your Visit        [facility] Information     [employer] lets you send messages to your doctor, view your test results, renew your prescriptions, schedule appointments and more. To sign up, go to [email or web address] . Click on \"Log in\" on the left side of the screen, which will take you to the Welcome page. Then click on \"Sign up Now\" on the right side of the page.     You will be asked to enter the access code listed below, as well as some personal information. Please follow the directions to create your username and password.     Your access code is: [email or web address]  Expires: 2018 10:14 AM     Your access code will  in 90 days. If you need help or a new code, please call your [place] clinic or [phone].        Care EveryWhere ID     This is your Care EveryWhere ID. This could be used by other organizations to access your [facility] medical records  [id number]        Your Vitals Were     Pulse Temperature Respirations Height BMI (Body Mass Index)       81 98.8  F (37.1  C) (Oral) 16 5' 4\" (1.626 m) 41.37 kg/m2        Blood Pressure from Last 3 Encounters:   10/23/17 134/77   17 135/76   17 119/88    Weight from Last 3 Encounters:   10/23/17 241 lb (109.3 kg)   17 235 lb (106.6 kg)   17 234 lb 14.4 oz (106.5 kg)              We Performed the Following     UA reflex to Microscopic and Culture     Urine Culture Aerobic Bacterial     Urine Microscopic          Today's Medication Changes          These changes are accurate as of: 10/23/17 10:14 AM.  If you have any questions, ask your nurse or doctor.               Start taking these " medicines.        Dose/Directions    nitroFURantoin (macrocrystal-monohydrate) 100 MG capsule   Commonly known as:  MACROBID   Used for:  Urinary tract infection without hematuria, site unspecified   Started by:  Birdie Kraus APRN CNP        Dose:  100 mg   Take 1 capsule (100 mg) by mouth 2 times daily   Quantity:  14 capsule   Refills:  0            Where to get your medicines      These medications were sent to [place] [facility] - [address]  [address], [place] [address]    Hours:  [facility] Phone:  [phone]     nitroFURantoin (macrocrystal-monohydrate) 100 MG capsule                Primary Care Provider Office Phone # Fax #    Bianca Peralta [phone] [address] [facility] [address]        Equal Access to Services     : Hadii aad ku hadasho Soomaali, waaxda luqadaha, qaybta kaalmada adeegyada, waxay manuelin hayaan lucio wells . So [facility] [phone].    ATENCIÓN: Si habla español, tiene a cardenas disposición servicios gratuitos de asistencia lingüística. [facility] [phone].    We comply with applicable federal civil rights laws and [place] laws. We do not discriminate on the basis of race, color, national origin, age, disability, sex, sexual orientation, or gender identity.            Thank you!     Thank you for choosing [facility]  for your care. Our goal is always to provide you with excellent care. Hearing back from our patients is one way we can continue to improve our services. Please take a few minutes to complete the written survey that you may receive in the mail after your visit with us. Thank you!             Your Updated Medication List - Protect others around you: Learn how to safely use, store and throw away your medicines at www.disposemymeds.org.          This list is accurate as of: 10/23/17 10:14 AM.  Always use your most recent med list.                    Brand Name Dispense Instructions for use Diagnosis    ascorbic acid 500 MG tablet    VITAMIN C    100 tablet    Take 1 tablet by mouth daily.        aspirin 325 MG EC tablet     42 tablet    Take 1 tablet (325 mg) by mouth daily with food    Status post total left knee replacement       calcium 600 MG tablet      1 daily        cholecalciferol 1000 UNIT tablet    vitamin D3     Take 1 tablet by mouth daily.        fish oil-omega-3 fatty acids 1000 MG capsule      1 daily        IRON SUPPLEMENT PO      Take 325 mg by mouth every other day        * mirabegron 50 MG 24 hr tablet    MYRBETRIQ    90 tablet    Take 1 tablet (50 mg) by mouth daily    Bladder infection       * mirabegron 50 MG 24 hr tablet    MYRBETRIQ    90 tablet    Take 1 tablet (50 mg) by mouth daily    OAB (overactive bladder)       naproxen 500 MG tablet    NAPROSYN    60 tablet    Take 1 tablet (500 mg) by mouth 2 times daily (with meals) As needed for knee or hip pain    Primary osteoarthritis of left knee       nitroFURantoin (macrocrystal-monohydrate) 100 MG capsule    MACROBID    14 capsule    Take 1 capsule (100 mg) by mouth 2 times daily    Urinary tract infection without hematuria, site unspecified       omeprazole 40 MG capsule    priLOSEC    90 capsule    Take 1 capsule (40 mg) by mouth daily Take 30-60 minutes before a meal.    Gastroesophageal reflux disease without esophagitis       senna-docusate 8.6-50 MG per tablet    SENOKOT-S;PERICOLACE    100 tablet    Take 1 tablet by mouth At Bedtime    Status post total left knee replacement       vitamin E 400 UNIT capsule     100 capsule    Take 1 capsule by mouth daily.        * Notice:  This list has 2 medication(s) that are the same as other medications prescribed for you. Read the directions carefully, and ask your doctor or other care provider to review them with you.

## 2017-10-25 LAB
BACTERIA SPEC CULT: ABNORMAL
Lab: ABNORMAL
SPECIMEN SOURCE: ABNORMAL

## 2017-11-21 ENCOUNTER — TELEPHONE (OUTPATIENT)
Dept: FAMILY MEDICINE | Facility: CLINIC | Age: 62
End: 2017-11-21

## 2017-11-21 NOTE — TELEPHONE ENCOUNTER
"11/21/2017      Patient Communication Preferences indicate  Do not contact  and/or communication by \"Phone\" is not preferred. Call not required per Outreach team.          Outreach ,  Edward Donald    "

## 2017-12-04 PROCEDURE — 82274 ASSAY TEST FOR BLOOD FECAL: CPT | Performed by: NURSE PRACTITIONER

## 2017-12-07 DIAGNOSIS — Z12.11 SPECIAL SCREENING FOR MALIGNANT NEOPLASMS, COLON: ICD-10-CM

## 2017-12-07 LAB — HEMOCCULT STL QL IA: NEGATIVE

## 2017-12-19 ENCOUNTER — TELEPHONE (OUTPATIENT)
Dept: UROLOGY | Facility: CLINIC | Age: 62
End: 2017-12-19

## 2017-12-19 NOTE — TELEPHONE ENCOUNTER
Prior authorization was sent on 12/19/2017 to Cover My Meds and a print out is in a file labeled prior authorizations in specialty clinic.    Michael De La Vega, CMA

## 2017-12-26 ENCOUNTER — OFFICE VISIT (OUTPATIENT)
Dept: UROLOGY | Facility: CLINIC | Age: 62
End: 2017-12-26
Payer: COMMERCIAL

## 2017-12-26 VITALS
HEART RATE: 81 BPM | DIASTOLIC BLOOD PRESSURE: 81 MMHG | SYSTOLIC BLOOD PRESSURE: 136 MMHG | BODY MASS INDEX: 42.1 KG/M2 | RESPIRATION RATE: 16 BRPM | HEIGHT: 64 IN | WEIGHT: 246.6 LBS

## 2017-12-26 DIAGNOSIS — N32.81 OAB (OVERACTIVE BLADDER): ICD-10-CM

## 2017-12-26 DIAGNOSIS — N39.3 URINARY, INCONTINENCE, STRESS FEMALE: Primary | ICD-10-CM

## 2017-12-26 LAB
ALBUMIN UR-MCNC: NEGATIVE MG/DL
APPEARANCE UR: CLEAR
BILIRUB UR QL STRIP: NEGATIVE
COLOR UR AUTO: YELLOW
GLUCOSE UR STRIP-MCNC: NEGATIVE MG/DL
HGB UR QL STRIP: NEGATIVE
KETONES UR STRIP-MCNC: NEGATIVE MG/DL
LEUKOCYTE ESTERASE UR QL STRIP: NEGATIVE
NITRATE UR QL: NEGATIVE
PH UR STRIP: 6 PH (ref 5–7)
SOURCE: NORMAL
SP GR UR STRIP: 1.02 (ref 1–1.03)
UROBILINOGEN UR STRIP-ACNC: 0.2 EU/DL (ref 0.2–1)

## 2017-12-26 PROCEDURE — 52287 CYSTOSCOPY CHEMODENERVATION: CPT | Performed by: UROLOGY

## 2017-12-26 PROCEDURE — 81003 URINALYSIS AUTO W/O SCOPE: CPT | Performed by: UROLOGY

## 2017-12-26 RX ORDER — LEVOFLOXACIN 250 MG/1
250 TABLET, FILM COATED ORAL DAILY
Qty: 3 TABLET | Refills: 0 | Status: SHIPPED | OUTPATIENT
Start: 2017-12-26 | End: 2018-07-23

## 2017-12-26 NOTE — NURSING NOTE
"Chief Complaint   Patient presents with     Cystoscopy     with Botox injections       Initial /81 (BP Location: Left arm, Patient Position: Chair, Cuff Size: Adult Large)  Pulse 81  Resp 16  Ht 1.626 m (5' 4\")  Wt 111.9 kg (246 lb 9.6 oz)  BMI 42.33 kg/m2 Estimated body mass index is 42.33 kg/(m^2) as calculated from the following:    Height as of this encounter: 1.626 m (5' 4\").    Weight as of this encounter: 111.9 kg (246 lb 9.6 oz).  Medication Reconciliation: complete     Michael De La Vega CMA      "

## 2017-12-26 NOTE — PROGRESS NOTES
Here for Botox for OAB wet, modest bladder capacity, s/p TVT    Again reviewed the nature of Botox therapy as well as the technical aspects, risks, complications, recovery, results, retention, followup, need for repeat injections, etc; pt expresses understanding, informed consent obtained.        Current Outpatient Prescriptions   Medication     naproxen (NAPROSYN) 500 MG tablet     omeprazole (PRILOSEC) 40 MG capsule     mirabegron (MYRBETRIQ) 50 MG 24 hr tablet     Ferrous Sulfate (IRON SUPPLEMENT PO)     mirabegron (MYRBETRIQ) 50 MG 24 hr tablet     cholecalciferol (VITAMIN D) 1000 UNIT tablet     ascorbic acid (VITAMIN C) 500 MG tablet     vitamin E 400 UNIT capsule     CALCIUM 600 MG OR TABS     FISH OIL 1000 MG OR CAPS     aspirin 325 MG EC tablet     senna-docusate (SENOKOT-S;PERICOLACE) 8.6-50 MG per tablet     No current facility-administered medications for this visit.          Results for orders placed or performed in visit on 12/26/17   UA reflex to Microscopic and Culture [[id number]]   Result Value Ref Range    Color Urine Yellow     Appearance Urine Clear     Glucose Urine Negative NEG^Negative mg/dL    Bilirubin Urine Negative NEG^Negative    Ketones Urine Negative NEG^Negative mg/dL    Specific Gravity Urine 1.020 1.003 - 1.035    Blood Urine Negative NEG^Negative    pH Urine 6.0 5.0 - 7.0 pH    Protein Albumin Urine Negative NEG^Negative mg/dL    Urobilinogen Urine 0.2 0.2 - 1.0 EU/dL    Nitrite Urine Negative NEG^Negative    Leukocyte Esterase Urine Negative NEG^Negative    Source Midstream Urine          Urojet for 10 minutes. Then Botox A 100 units ([id number], 05/2020) into 10 cc saline, injected into posterior wall at 3 sites; excellent blebs and hemostasis.      IMP:  1. Botox #1 for OAB wet      PLAN:  1. Discussed situation with patient in detail.  2. Precautions  3. levoquin 250 x 1  4. RTC 2 wks

## 2017-12-26 NOTE — MR AVS SNAPSHOT
"              After Visit Summary   12/26/2017    Mirian Cruz    MRN: [id number]           Patient Information     Date Of Birth          1955        Visit Information        Provider Department      12/26/2017 2:30 PM Doni Benavides MD [facility]        Today's Diagnoses     Urinary, incontinence, stress female    -  1    OAB (overactive bladder)           Follow-ups after your visit        Your next 10 appointments already scheduled     Jan 09, 2018  3:00 PM CST   Return Visit with Doni Benavides MD   [facility] ([facility]    [address] [facility] [place] [address]   [phone]              Who to contact     If you have questions or need follow up information about today's clinic visit or your schedule please contact [facility] directly at [phone].  Normal or non-critical lab and imaging results will be communicated to you by MyChart, letter or phone within 4 business days after the clinic has received the results. If you do not hear from us within 7 days, please contact the clinic through MyChart or phone. If you have a critical or abnormal lab result, we will notify you by phone as soon as possible.  Submit refill requests through [employer] or call your pharmacy and they will forward the refill request to us. Please allow 3 business days for your refill to be completed.          Additional Information About Your Visit        MyChart Information     [employer] lets you send messages to your doctor, view your test results, renew your prescriptions, schedule appointments and more. To sign up, go to [email or web address] . Click on \"Log in\" on the left side of the screen, which will take you to the Welcome page. Then click on \"Sign up Now\" on the right side of the page.     You will be asked to enter the access code listed below, as well as some personal information. Please follow the directions to create your username and " "password.     Your access code is: [email or web address]  Expires: 2018  9:14 AM     Your access code will  in 90 days. If you need help or a new code, please call your [facility] clinic or [phone].        Care EveryWhere ID     This is your Care EveryWhere ID. This could be used by other organizations to access your [facility] medical records  [id number]        Your Vitals Were     Pulse Respirations Height BMI (Body Mass Index)          81 16 1.626 m (5' 4\") 42.33 kg/m2         Blood Pressure from Last 3 Encounters:   17 136/81   10/23/17 134/77   17 135/76    Weight from Last 3 Encounters:   17 111.9 kg (246 lb 9.6 oz)   10/23/17 109.3 kg (241 lb)   17 106.6 kg (235 lb)              We Performed the Following     BOTULINUM TOXIN A PER UNIT []     CYSTOURETHROSCOPY INJ CHEMODENERVATION BLADDER (62825)     UA reflex to Microscopic and Culture [[id number]]          Today's Medication Changes          These changes are accurate as of: 17  3:00 PM.  If you have any questions, ask your nurse or doctor.               Start taking these medicines.        Dose/Directions    levofloxacin 250 MG tablet   Commonly known as:  LEVAQUIN   Used for:  Urinary, incontinence, stress female   Started by:  Doni Benavides MD        Dose:  250 mg   Take 1 tablet (250 mg) by mouth daily   Quantity:  3 tablet   Refills:  0            Where to get your medicines      These medications [facility] to [facility] - [address]  [address], [place] [address]    Hours:  [place] Phone:  [phone]     levofloxacin 250 MG tablet                Primary Care Provider Office Phone # Fax #    ANNALEE Polo [phone] [address] [facility] [address]        Equal Access to Services     GERA ROLLE AH: Hadii elvia smith hadasho Soomaali, waaxda luqadaha, qaybta kaalmada adeegyada, marci boles " lucio luther'aan ah. So [facility] [phone].    ATENCIÓN: Si deny nj, tiene a cardenas disposición servicios gratuitos de asistencia lingüística. Kike al [phone].    We comply with applicable federal civil rights laws and [place] laws. We do not discriminate on the basis of race, color, national origin, age, disability, sex, sexual orientation, or gender identity.            Thank you!     Thank you for choosing [facility]  for your care. Our goal is always to provide you with excellent care. Hearing back from our patients is one way we can continue to improve our services. Please take a few minutes to complete the written survey that you may receive in the mail after your visit with us. Thank you!             Your Updated Medication List - Protect others around you: Learn how to safely use, store and throw away your medicines at www.disposemymeds.org.          This list is accurate as of: 12/26/17  3:00 PM.  Always use your most recent med list.                   Brand Name Dispense Instructions for use Diagnosis    ascorbic acid 500 MG tablet    VITAMIN C    100 tablet    Take 1 tablet by mouth daily.        aspirin 325 MG EC tablet     42 tablet    Take 1 tablet (325 mg) by mouth daily with food    Status post total left knee replacement       calcium 600 MG tablet      1 daily        cholecalciferol 1000 UNIT tablet    vitamin D3     Take 1 tablet by mouth daily.        fish oil-omega-3 fatty acids 1000 MG capsule      1 daily        IRON SUPPLEMENT PO      Take 325 mg by mouth every other day        levofloxacin 250 MG tablet    LEVAQUIN    3 tablet    Take 1 tablet (250 mg) by mouth daily    Urinary, incontinence, stress female       * mirabegron 50 MG 24 hr tablet    MYRBETRIQ    90 tablet    Take 1 tablet (50 mg) by mouth daily    Bladder infection       * mirabegron 50 MG 24 hr tablet    MYRBETRIQ    90 tablet    Take 1 tablet (50 mg) by mouth daily    OAB (overactive bladder)        naproxen 500 MG tablet    NAPROSYN    60 tablet    Take 1 tablet (500 mg) by mouth 2 times daily (with meals) As needed for knee or hip pain    Primary osteoarthritis of left knee       omeprazole 40 MG capsule    priLOSEC    90 capsule    Take 1 capsule (40 mg) by mouth daily Take 30-60 minutes before a meal.    Gastroesophageal reflux disease without esophagitis       senna-docusate 8.6-50 MG per tablet    SENOKOT-S;PERICOLACE    100 tablet    Take 1 tablet by mouth At Bedtime    Status post total left knee replacement       vitamin E 400 UNIT capsule     100 capsule    Take 1 capsule by mouth daily.        * Notice:  This list has 2 medication(s) that are the same as other medications prescribed for you. Read the directions carefully, and ask your doctor or other care provider to review them with you.

## 2017-12-27 NOTE — TELEPHONE ENCOUNTER
PA approved.  Effective date: 01/01/2018-01/01/2019  PA reference #: [id number]  Pt. notified:   Yes   Pt. informed directly but discussed effective date.  She had the procedure completed 12/26/2017.  I asked her if she received anything in regards to the PA and if she knew that procedure was approved.  She said she had no clue about anything and just assumed it would be paid for through her [employer] and [facility].  Will call [employer] to see if the procedure is covered.  I left a message with [employer] PA specialist and asked for a return call.  When someone calls back please inquire if the procedure was covered on 12/26/2017 or if there is something we can do.    Betsy Montalvo, CMA

## 2017-12-28 NOTE — TELEPHONE ENCOUNTER
Representative from  called me back and stated that the procedure will be covered on 12/26.      Betsy Montalvo CMA

## 2018-05-24 DIAGNOSIS — K21.9 GASTROESOPHAGEAL REFLUX DISEASE WITHOUT ESOPHAGITIS: ICD-10-CM

## 2018-05-24 NOTE — TELEPHONE ENCOUNTER
"Requested Prescriptions   Pending Prescriptions Disp Refills     omeprazole (PRILOSEC) 40 MG capsule  Last Written Prescription Date:  08/07/2017  Last Fill Quantity: 90,  # refills: 2   Last office visit: 10/23/2017 with prescribing provider:  Efra   Future Office Visit:     90 capsule 2     Sig: Take 1 capsule (40 mg) by mouth daily Take 30-60 minutes before a meal.    PPI Protocol Passed    5/24/2018  3:49 PM       Passed - Not on Clopidogrel (unless Pantoprazole ordered)       Passed - No diagnosis of osteoporosis on record       Passed - Recent (12 mo) or future (30 days) visit within the authorizing provider's specialty    Patient had office visit in the last 12 months or has a visit in the next 30 days with authorizing provider or within the authorizing provider's specialty.  See \"Patient Info\" tab in inbasket, or \"Choose Columns\" in Meds & Orders section of the refill encounter.           Passed - Patient is age 18 or older       Passed - No active pregnacy on record       Passed - No positive pregnancy test in past 12 months          "

## 2018-05-25 NOTE — TELEPHONE ENCOUNTER
Routing refill request to provider for review/approval because:  Medication has not been reviewed in the past year (upon chart review, do not see medication mentioned in any office visit since 10-27-09).   Please advise refill.    SOFIA Soto

## 2018-05-28 RX ORDER — OMEPRAZOLE 40 MG/1
40 CAPSULE, DELAYED RELEASE ORAL DAILY
Qty: 90 CAPSULE | Refills: 2 | Status: SHIPPED | OUTPATIENT
Start: 2018-05-28 | End: 2019-04-04

## 2018-07-23 ENCOUNTER — OFFICE VISIT (OUTPATIENT)
Dept: FAMILY MEDICINE | Facility: CLINIC | Age: 63
End: 2018-07-23
Payer: COMMERCIAL

## 2018-07-23 VITALS
WEIGHT: 245 LBS | DIASTOLIC BLOOD PRESSURE: 72 MMHG | BODY MASS INDEX: 40.82 KG/M2 | HEART RATE: 79 BPM | TEMPERATURE: 98.1 F | SYSTOLIC BLOOD PRESSURE: 130 MMHG | OXYGEN SATURATION: 96 % | HEIGHT: 65 IN | RESPIRATION RATE: 12 BRPM

## 2018-07-23 DIAGNOSIS — R82.90 NONSPECIFIC FINDING ON EXAMINATION OF URINE: ICD-10-CM

## 2018-07-23 DIAGNOSIS — R39.89 OTHER SYMPTOMS AND SIGNS INVOLVING THE GENITOURINARY SYSTEM: ICD-10-CM

## 2018-07-23 DIAGNOSIS — N30.00 ACUTE CYSTITIS WITHOUT HEMATURIA: Primary | ICD-10-CM

## 2018-07-23 LAB
ALBUMIN UR-MCNC: ABNORMAL MG/DL
APPEARANCE UR: ABNORMAL
BACTERIA #/AREA URNS HPF: ABNORMAL /HPF
BILIRUB UR QL STRIP: NEGATIVE
COLOR UR AUTO: YELLOW
GLUCOSE UR STRIP-MCNC: NEGATIVE MG/DL
HGB UR QL STRIP: ABNORMAL
KETONES UR STRIP-MCNC: NEGATIVE MG/DL
LEUKOCYTE ESTERASE UR QL STRIP: ABNORMAL
NITRATE UR QL: NEGATIVE
NON-SQ EPI CELLS #/AREA URNS LPF: ABNORMAL /LPF
PH UR STRIP: 5.5 PH (ref 5–7)
RBC #/AREA URNS AUTO: ABNORMAL /HPF
SOURCE: ABNORMAL
SP GR UR STRIP: 1.01 (ref 1–1.03)
UROBILINOGEN UR STRIP-ACNC: 0.2 EU/DL (ref 0.2–1)
WBC #/AREA URNS AUTO: ABNORMAL /HPF

## 2018-07-23 PROCEDURE — 99213 OFFICE O/P EST LOW 20 MIN: CPT | Performed by: NURSE PRACTITIONER

## 2018-07-23 PROCEDURE — 87186 SC STD MICRODIL/AGAR DIL: CPT | Performed by: NURSE PRACTITIONER

## 2018-07-23 PROCEDURE — 87088 URINE BACTERIA CULTURE: CPT | Performed by: NURSE PRACTITIONER

## 2018-07-23 PROCEDURE — 87086 URINE CULTURE/COLONY COUNT: CPT | Performed by: NURSE PRACTITIONER

## 2018-07-23 PROCEDURE — 81001 URINALYSIS AUTO W/SCOPE: CPT | Performed by: NURSE PRACTITIONER

## 2018-07-23 RX ORDER — NITROFURANTOIN 25; 75 MG/1; MG/1
100 CAPSULE ORAL 2 TIMES DAILY
Qty: 14 CAPSULE | Refills: 0 | Status: SHIPPED | OUTPATIENT
Start: 2018-07-23 | End: 2019-02-05

## 2018-07-23 NOTE — PROGRESS NOTES
SUBJECTIVE:   Mirian Cruz is a 63 year old female who presents to clinic today for the following health issues:      URINARY TRACT SYMPTOMS  Onset: 1 week    Description: Pt states that she has a hx of UTI's. Current sx start 1 week ago and worsened.  Painful urination (Dysuria): YES  Blood in urine (Hematuria): no   Delay in urine (Hesitency): YES    Intensity: mild    Progression of Symptoms:  worsening and constant    Accompanying Signs & Symptoms:  Fever/chills: no   Flank pain no   Nausea and vomiting: no   Any vaginal symptoms: none  Abdominal/Pelvic Pain: no     History:   History of frequent UTI's: YES- Last UTI was about 6 months ago.  History of kidney stones: no   Sexually Active: no   Possibility of pregnancy: No    Precipitating factors:   Pt gets Botox injections in her bladder to help with frequency.    Therapies Tried and outcome: Azo and Increase fluid intake have not been successful.  Drinking cranberry juice.    No vaginal symptoms.  Symptoms are getting better.  Patient does state that she has been using a new soap on her armpits and bottom in the shower.    Problem list and histories reviewed & adjusted, as indicated.  Additional history: as documented    Patient Active Problem List   Diagnosis     Tobacco use disorder     GERD (gastroesophageal reflux disease)     Morbid obesity (H)     Hyperlipidemia LDL goal <130     GBS (Guillain-[place] syndrome) (H)     Advanced directives, counseling/discussion     Sleep related leg cramps     Urinary, incontinence, stress female     Urethral hypermobility     OA (osteoarthritis) of knee     Status post total right knee replacement     Prediabetes     Status post total left knee replacement     Left knee DJD     Past Surgical History:   Procedure Laterality Date     ARTHROPLASTY KNEE Right 2/18/2015    Procedure: ARTHROPLASTY KNEE;  Surgeon: Zelalem Mendez MD;  Location: [facility]     ARTHROPLASTY KNEE Left 4/5/2017    Procedure: ARTHROPLASTY KNEE;   Surgeon: Zelalem Mendez MD;  Location: [facility]     BUNIONECTOMY CHEVRON AND REUBEN, COMBINED  3/25/2011    COMBINED BUNIONECTOMY CHEVRON AND REUBEN performed by TRISTON SEWELL at [facility]     CYSTOSCOPY  5/20/2013    Procedure: CYSTOSCOPY;;  Surgeon: Adan Morrison MD;  Location: [facility]     REMOVE HARDWARE FOOT  2/15/2012    Procedure:REMOVE HARDWARE FOOT; Removal of hardware left foot; Surgeon:TRISTON SEWELL; Location:[facility]     SLING TRANSVAGINAL  5/20/2013    Procedure: SLING TRANSVAGINAL;  Transvaginal taping, cystoscopy;  Surgeon: Adan Morrison MD;  Location: [facility]     TUBAL LIGATION         Social History   Substance Use Topics     Smoking status: Former Smoker     Types: Cigarettes     Quit date: 7/18/2015     Smokeless tobacco: Never Used     Alcohol use 0.0 oz/week     0 Standard drinks or equivalent per week      Comment: occ     Family History   Problem Relation Age of Onset     Diabetes Mother      GASTROINTESTINAL DISEASE Father      colon polyps     Diabetes Maternal Grandmother          Current Outpatient Prescriptions   Medication Sig Dispense Refill     ascorbic acid (VITAMIN C) 500 MG tablet Take 1 tablet by mouth daily. 100 tablet 12     CALCIUM 600 MG OR TABS 1 daily       cholecalciferol (VITAMIN D) 1000 UNIT tablet Take 1 tablet by mouth daily.       Ferrous Sulfate (IRON SUPPLEMENT PO) Take 325 mg by mouth every other day       FISH OIL 1000 MG OR CAPS 1 daily       naproxen (NAPROSYN) 500 MG tablet Take 1 tablet (500 mg) by mouth 2 times daily (with meals) As needed for knee or hip pain 60 tablet 0     omeprazole (PRILOSEC) 40 MG capsule Take 1 capsule (40 mg) by mouth daily Take 30-60 minutes before a meal. 90 capsule 2     vitamin E 400 UNIT capsule Take 1 capsule by mouth daily. 100 capsule 12     Allergies   Allergen Reactions     Septra [Sulfamethoxazole W/Trimethoprim] Other (See Comments)     Stomatitis with mouth ulcerations     Influenza Vaccine Live       "History of Guillain-[place]       Reviewed and updated as needed this visit by clinical staff  Tobacco  Allergies  Meds  Problems       Reviewed and updated as needed this visit by Provider  Allergies  Meds  Problems         ROS:  CONSTITUTIONAL: NEGATIVE for fever, chills, change in weight  RESP: NEGATIVE for significant cough or SOB  CV: NEGATIVE for chest pain, palpitations or peripheral edema  : dysuria and hesitancy  PSYCHIATRIC: NEGATIVE for changes in mood or affect  ROS otherwise negative    OBJECTIVE:     /72  Pulse 79  Temp 98.1  F (36.7  C) (Tympanic)  Resp 12  Ht 5' 4.5\" (1.638 m)  Wt 245 lb (111.1 kg)  SpO2 96%  BMI 41.4 kg/m2  Body mass index is 41.4 kg/(m^2).  GENERAL: healthy, alert and no distress  RESP: lungs clear to auscultation - no rales, rhonchi or wheezes  CV: regular rate and rhythm, normal S1 S2, no S3 or S4, no murmur, click or rub, no peripheral edema and peripheral pulses strong  ABDOMEN: soft, nontender, no hepatosplenomegaly, no masses and bowel sounds normal  PSYCH: mentation appears normal, affect normal/bright    Diagnostic Test Results:  Results for orders placed or performed in visit on 07/23/18 (from the past 24 hour(s))   *UA reflex to Microscopic and Culture ([place] and [facility] (except [place] and [place])   Result Value Ref Range    Color Urine Yellow     Appearance Urine Cloudy     Glucose Urine Negative NEG^Negative mg/dL    Bilirubin Urine Negative NEG^Negative    Ketones Urine Negative NEG^Negative mg/dL    Specific Gravity Urine 1.015 1.003 - 1.035    Blood Urine Trace (A) NEG^Negative    pH Urine 5.5 5.0 - 7.0 pH    Protein Albumin Urine Trace (A) NEG^Negative mg/dL    Urobilinogen Urine 0.2 0.2 - 1.0 EU/dL    Nitrite Urine Negative NEG^Negative    Leukocyte Esterase Urine Moderate (A) NEG^Negative    Source Midstream Urine    Urine Microscopic   Result Value Ref Range    WBC Urine 10-25 (A) OTO5^0 - 5 /HPF    RBC Urine 2-5 (A) OTO2^O - 2 " /HPF    Squamous Epithelial /LPF Urine Few FEW^Few /LPF    Bacteria Urine Many (A) NEG^Negative /HPF       ASSESSMENT/PLAN:     1. Acute cystitis without hematuria  UA positive, culture pending.  Treating with Macrobid for 7 days.  Conservative care with AZO and pushing fluids should be continued.  Recommend discontinued use of new soap to her bottom.  Follow-up in clinic if any worsening symptoms or persistent symptoms despite treatment.  - nitroFURantoin, macrocrystal-monohydrate, (MACROBID) 100 MG capsule; Take 1 capsule (100 mg) by mouth 2 times daily  Dispense: 14 capsule; Refill: 0    2. Other symptoms and signs involving the genitourinary system  - *UA reflex to Microscopic and Culture ([place] and [facility] (except [place] and [place])  - Urine Microscopic    3. Nonspecific finding on examination of urine  - Urine Culture Aerobic Bacterial    Elif Paul NP  [facility]

## 2018-07-23 NOTE — MR AVS SNAPSHOT
"              After Visit Summary   7/23/2018    Mirian Cruz    MRN: [id number]           Patient Information     Date Of Birth          1955        Visit Information        Provider Department      7/23/2018 10:00 AM Elif Paul NP [facility]        Today's Diagnoses     Acute cystitis without hematuria    -  1    Other symptoms and signs involving the genitourinary system        Nonspecific finding on examination of urine           Follow-ups after your visit        Follow-up notes from your care team     Return if symptoms worsen or fail to improve.      Who to contact     If you have questions or need follow up information about today's clinic visit or your schedule please contact [facility] directly at [phone].  Normal or non-critical lab and imaging results will be communicated to you by MyChart, letter or phone within 4 business days after the clinic has received the results. If you do not hear from us within 7 days, please contact the clinic through MyChart or phone. If you have a critical or abnormal lab result, we will notify you by phone as soon as possible.  Submit refill requests through [employer] or call your pharmacy and they will forward the refill request to us. Please allow 3 business days for your refill to be completed.          Additional Information About Your Visit        Care EveryWhere ID     This is your Care EveryWhere ID. This could be used by other organizations to access your [facility] medical records  [id number]        Your Vitals Were     Pulse Temperature Respirations Height Pulse Oximetry BMI (Body Mass Index)    79 98.1  F (36.7  C) (Tympanic) 12 5' 4.5\" (1.638 m) 96% 41.4 kg/m2       Blood Pressure from Last 3 Encounters:   07/23/18 130/72   12/26/17 136/81   10/23/17 134/77    Weight from Last 3 Encounters:   07/23/18 245 lb (111.1 kg)   12/26/17 246 lb 9.6 oz (111.9 kg)   10/23/17 241 lb (109.3 kg)              We " Performed the Following     *UA reflex to Microscopic and Culture ([place] and [facility] (except [place] and [place])     Urine Culture Aerobic Bacterial     Urine Microscopic          Today's Medication Changes          These changes are accurate as of 7/23/18 11:08 AM.  If you have any questions, ask your nurse or doctor.               Start taking these medicines.        Dose/Directions    nitroFURantoin (macrocrystal-monohydrate) 100 MG capsule   Commonly known as:  MACROBID   Used for:  Acute cystitis without hematuria   Started by:  Elif Paul NP        Dose:  100 mg   Take 1 capsule (100 mg) by mouth 2 times daily   Quantity:  14 capsule   Refills:  0            Where to get your medicines      These medications were sent to [facility] - [facility] [id number] [address]    Hours:  [place] [facility] Phone:  [phone]     nitroFURantoin (macrocrystal-monohydrate) 100 MG capsule                Primary Care Provider Office Phone # Fax #    ANNALEE Polo [phone] [address] [facility] [address]        Equal Access to Services     GERA ROLLE AH: Hadii aad ku hadasho Soomaali, waaxda luqadaha, qaybta kaalmada adeegyada, marci edmonds haybrandin bass. So [facility] [phone].    ATENCIÓN: Si habla español, tiene a cardenas disposición servicios gratuitos de asistencia lingüística. [facility] [phone].    We comply with applicable federal civil rights laws and [place] laws. We do not discriminate on the basis of race, color, national origin, age, disability, sex, sexual orientation, or gender identity.            Thank you!     Thank you for choosing [facility]  for your care. Our goal is always to provide you with excellent care. Hearing back from our patients is one way we can continue to improve our services. Please take a few minutes to  complete the written survey that you may receive in the mail after your visit with us. Thank you!             Your Updated Medication List - Protect others around you: Learn how to safely use, store and throw away your medicines at www.disposemymeds.org.          This list is accurate as of 7/23/18 11:08 AM.  Always use your most recent med list.                   Brand Name Dispense Instructions for use Diagnosis    ascorbic acid 500 MG tablet    VITAMIN C    100 tablet    Take 1 tablet by mouth daily.        calcium 600 MG tablet      1 daily        cholecalciferol 1000 UNIT tablet    vitamin D3     Take 1 tablet by mouth daily.        fish oil-omega-3 fatty acids 1000 MG capsule      1 daily        IRON SUPPLEMENT PO      Take 325 mg by mouth every other day        naproxen 500 MG tablet    NAPROSYN    60 tablet    Take 1 tablet (500 mg) by mouth 2 times daily (with meals) As needed for knee or hip pain    Primary osteoarthritis of left knee       nitroFURantoin (macrocrystal-monohydrate) 100 MG capsule    MACROBID    14 capsule    Take 1 capsule (100 mg) by mouth 2 times daily    Acute cystitis without hematuria       omeprazole 40 MG capsule    priLOSEC    90 capsule    Take 1 capsule (40 mg) by mouth daily Take 30-60 minutes before a meal.    Gastroesophageal reflux disease without esophagitis       vitamin E 400 UNIT capsule     100 capsule    Take 1 capsule by mouth daily.

## 2018-07-24 LAB
BACTERIA SPEC CULT: ABNORMAL
Lab: ABNORMAL
SPECIMEN SOURCE: ABNORMAL

## 2018-11-15 ENCOUNTER — TELEPHONE (OUTPATIENT)
Dept: FAMILY MEDICINE | Facility: CLINIC | Age: 63
End: 2018-11-15

## 2018-11-15 ENCOUNTER — TELEPHONE (OUTPATIENT)
Dept: UROLOGY | Facility: CLINIC | Age: 63
End: 2018-11-15

## 2018-11-15 DIAGNOSIS — Z12.11 SPECIAL SCREENING FOR MALIGNANT NEOPLASMS, COLON: Primary | ICD-10-CM

## 2018-11-15 NOTE — LETTER
Formerly named [facility]  [address]  [facility] [address]  [phone]  November 15, 2018    Mirian Cruz  [address]  [facility] [address]    Dear Mirian,       This is a reminder that it is time to make your appointment for your annual mammogram.  You may make an appointment for your Digital Mammogram by calling our scheduling line at [phone] to schedule at one of our locations. Your last mammogram was 4/25/18.    If you are experiencing breast symptoms or concerns such as a new lump or breast pain you should first contact your health care provider before scheduling your mammogram. Your physician may want to see you prior to your visit.    We would like to take this opportunity to remind you that along with an annual mammogram, the American Cancer Society recommends an annual breast examination by your physician or health care provider.    If you are a patient currently enrolled in the [place] ANNA Program or the [facility] Wellness Program, you must be seen by your provider for a clinical breast examination prior to your mammogram.    Please disregard this notice if you have already scheduled an appointment. Or, if you have had this done elsewhere, please have your records sent to the clinic.     Thank you and we look forward to seeing you in the near future for your annual screening mammogram.    Healthy Regards,      Your Health Care Team  [facility]

## 2018-11-15 NOTE — LETTER
[facility]  [address]  [facility] [address]  [phone]  November 15, 2018    Mirian Cruz  [address]  [facility] [address]    Dear Mirian,    We care about your health and have reviewed your health plan including your medical conditions, medication list, and lab results.  Based on this review, it is recommended that you follow up regarding the following health topic(s):     -Colon Cancer Screening  -Wellness (Physical) Visit     We recommend you take the following action(s):    -Please call [phone] to schedule your annual physical     Colon cancer screening. Enclosed is a FIT. This is a yearly test. Your last FIT was completed 12/4/17. We have noted that you have declined having a colonoscopy or other form of Colon Cancer screening in the past. This is an important part of preventative care once you have turned 50 years old.     This is a test to check for blood in the stool. Studies have proven that this test, when performed every 1-2 years in people ages 50 to 75, reduces the number of deaths due to Colorectal cancer by as much as 30 %.    Thank you for trusting [facility] and we appreciate the opportunity to serve you.  We look forward to supporting your healthcare needs in the future.    Healthy Regards,      Stephanie Degroot MD  Your Health Care Team  [facility]

## 2018-11-15 NOTE — TELEPHONE ENCOUNTER
Surgery Pre-Certification    Medical Record Number: [id number]  Mirian Cruz  YOB: 1955   Phone: [phone] (home)   Primary Provider: Birdie Kraus    Reason for Admit:  OAB (N32.81)    Surgeon: EDWARD Benavides MD  Surgical Procedure: Cystoscopy with Botox Injection  ICD-9 Coded: /61305  Date of Surgery: 11/20/18  Consent signed? No    Date signed: 11/20/18  Hospital: [facility]  In-Clinic Procedure    Requestor:  Nataliya Simpson     Location:  [facility]    Certification/Reference Number  [id number]    Status  Pended    Effective Date  11/20/2018    Called [employer] still pending through [employer]. Can take up to 10 business days. I let Nataliya know. 11/19/2018    Has been reschedule to 11/23/2018. Will keep checking for [employer] approval. 11/19/2018

## 2018-11-15 NOTE — TELEPHONE ENCOUNTER
Panel Management Review      Patient has the following on her problem list: None      Composite cancer screening  Chart review shows that this patient is due/due soon for the following Pap Smear, Mammogram and Fecal Colorectal (FIT)  Summary:    Patient is due/failing the following:   FIT, MAMMOGRAM, PAP and PHYSICAL    Action needed:   Patient needs office visit for annual pe. and mammogram and complete FIT    Type of outreach:    Sent letter.    Questions for provider review:    None                                                                                                                                    Ginny Lewis MA

## 2018-11-23 NOTE — TELEPHONE ENCOUNTER
Per [employer] case is still pending.     Thank you,   Yasemin Calderon   Referral Department  [phone]

## 2018-11-26 NOTE — TELEPHONE ENCOUNTER
Called patient and left VM, informed that as of Friday, PA was still pending. Informed that if I hear anything before her appointment tomorrow afternoon, I will call her. Informed that she can keep her appointment, but will need to sign ABN form if PA doesn't come through beforehand; otherwise she can cancel and reschedule. Advised to call with any questions, clinic number provided.     Nataliya BENITEZ MA

## 2018-11-27 NOTE — TELEPHONE ENCOUNTER
Please close encounter if PA determination is not received prior to 11/30/18; patient's insurance terminates on 11/30/18.    Nataliya BENITEZ MA

## 2018-11-28 NOTE — TELEPHONE ENCOUNTER
Called [employer]. They said status should be available by 11/29/2018. Case number is [id number]. I had them note the date of procedure as 11/30/2018. Will call again tomorrow.

## 2018-11-29 NOTE — TELEPHONE ENCOUNTER
Approval noted. Unfortunately, patient is unable to get Botox injection due to her insurance going inactive on 12/1/18. Also, Dr Benavides is not in clinic the remainder of the week.     Encounter will be closed now.    Nataliya BENITEZ MA

## 2018-11-29 NOTE — TELEPHONE ENCOUNTER
Has been approved. 100 units every 12 weeks. 11/18/2018 to 11/19/2019.     Case number [id number]

## 2019-02-05 ENCOUNTER — HOSPITAL ENCOUNTER (EMERGENCY)
Facility: CLINIC | Age: 64
Discharge: HOME | End: 2019-02-05
Attending: FAMILY MEDICINE | Admitting: FAMILY MEDICINE
Payer: COMMERCIAL

## 2019-02-05 ENCOUNTER — APPOINTMENT (OUTPATIENT)
Dept: GENERAL RADIOLOGY | Facility: CLINIC | Age: 64
End: 2019-02-05
Attending: PHYSICIAN ASSISTANT

## 2019-02-05 VITALS
BODY MASS INDEX: 40.56 KG/M2 | RESPIRATION RATE: 16 BRPM | OXYGEN SATURATION: 95 % | TEMPERATURE: 97 F | SYSTOLIC BLOOD PRESSURE: 145 MMHG | WEIGHT: 240 LBS | HEART RATE: 85 BPM | DIASTOLIC BLOOD PRESSURE: 63 MMHG

## 2019-02-05 DIAGNOSIS — S42.291A OTHER CLOSED DISPLACED FRACTURE OF PROXIMAL END OF RIGHT HUMERUS, INITIAL ENCOUNTER: ICD-10-CM

## 2019-02-05 PROCEDURE — 73060 X-RAY EXAM OF HUMERUS: CPT | Mod: TC,RT

## 2019-02-05 PROCEDURE — 25000128 H RX IP 250 OP 636: Performed by: PHYSICIAN ASSISTANT

## 2019-02-05 PROCEDURE — 96376 TX/PRO/DX INJ SAME DRUG ADON: CPT

## 2019-02-05 PROCEDURE — 73030 X-RAY EXAM OF SHOULDER: CPT | Mod: RT

## 2019-02-05 PROCEDURE — 99284 EMERGENCY DEPT VISIT MOD MDM: CPT | Mod: 25 | Performed by: PHYSICIAN ASSISTANT

## 2019-02-05 PROCEDURE — 99285 EMERGENCY DEPT VISIT HI MDM: CPT | Mod: 25

## 2019-02-05 PROCEDURE — 23600 CLTX PROX HUMRL FX W/O MNPJ: CPT

## 2019-02-05 PROCEDURE — 96374 THER/PROPH/DIAG INJ IV PUSH: CPT

## 2019-02-05 PROCEDURE — 23600 CLTX PROX HUMRL FX W/O MNPJ: CPT | Mod: 54 | Performed by: PHYSICIAN ASSISTANT

## 2019-02-05 RX ORDER — OXYCODONE AND ACETAMINOPHEN 5; 325 MG/1; MG/1
1 TABLET ORAL EVERY 4 HOURS PRN
Qty: 20 TABLET | Refills: 0 | Status: SHIPPED | OUTPATIENT
Start: 2019-02-05 | End: 2019-04-04

## 2019-02-05 RX ORDER — HYDROMORPHONE HYDROCHLORIDE 1 MG/ML
0.5 INJECTION, SOLUTION INTRAMUSCULAR; INTRAVENOUS; SUBCUTANEOUS
Status: COMPLETED | OUTPATIENT
Start: 2019-02-05 | End: 2019-02-05

## 2019-02-05 RX ORDER — HYDROMORPHONE HYDROCHLORIDE 1 MG/ML
0.5 INJECTION, SOLUTION INTRAMUSCULAR; INTRAVENOUS; SUBCUTANEOUS ONCE
Status: COMPLETED | OUTPATIENT
Start: 2019-02-05 | End: 2019-02-05

## 2019-02-05 RX ADMIN — HYDROMORPHONE HYDROCHLORIDE 0.5 MG: 1 INJECTION, SOLUTION INTRAMUSCULAR; INTRAVENOUS; SUBCUTANEOUS at 13:37

## 2019-02-05 RX ADMIN — HYDROMORPHONE HYDROCHLORIDE 0.5 MG: 1 INJECTION, SOLUTION INTRAMUSCULAR; INTRAVENOUS; SUBCUTANEOUS at 15:59

## 2019-02-05 ASSESSMENT — ENCOUNTER SYMPTOMS
BACK PAIN: 0
LIGHT-HEADEDNESS: 0
NECK PAIN: 0
WEAKNESS: 0
CHILLS: 0
ABDOMINAL PAIN: 0
NAUSEA: 0
PHOTOPHOBIA: 0
COLOR CHANGE: 0
NUMBNESS: 0
HEADACHES: 0
EYE PAIN: 0
COUGH: 0
WOUND: 0
DIZZINESS: 0
VOMITING: 0
SHORTNESS OF BREATH: 0
WHEEZING: 0
DIARRHEA: 0
EYE REDNESS: 0
FEVER: 0
ARTHRALGIAS: 1

## 2019-02-05 NOTE — ED PROVIDER NOTES
History     Chief Complaint   Patient presents with     Fall     slip and fall on ice, concern for R shoulder dislocation, not taking blood thinners, did not hit head.     Shoulder Injury     HPI  Mirian Cruz is a 63 year old female who presents to the emergency department with concern over right shoulder pain, concern for dislocation after she sustained a fall on the ice earlier today.  Patient slipped on the sidewalk while she was dropping off a rent at her landlords and fell directly on the lateral aspect of her right shoulder.  She had sudden onset of pain rating down the upper right arm.  She denies any other areas of injury.  She specifically denies hitting her head.  She has not had any headache, dizziness, lightheadedness, nausea, vomiting, photo or phonophobia.  She has not had any neck pain, chest pain, palpitations, dyspnea, wheezing.  She has not attempted any OTC treatments instead presenting directly to the emergency department.  She denies any history of blood thinner use.    Allergies:  Allergies   Allergen Reactions     Septra [Sulfamethoxazole W/Trimethoprim] Other (See Comments)     Stomatitis with mouth ulcerations     Influenza Vaccine Live Other (See Comments)     History of Guillain-[place]     Problem List:    Patient Active Problem List    Diagnosis Date Noted     Status post total left knee replacement 04/05/2017     Priority: Medium     Left knee DJD 04/05/2017     Priority: Medium     Prediabetes 04/13/2016     Priority: Medium     Status post total right knee replacement 02/18/2015     Priority: Medium     OA (osteoarthritis) of knee 02/10/2015     Priority: Medium     Urinary, incontinence, stress female 05/07/2013     Priority: Medium     Urethral hypermobility 05/07/2013     Priority: Medium     Advanced directives, counseling/discussion 01/10/2013     Priority: Medium     Patient does not have an Advance/Health Care Directive (HCD), declines information/referral.    Mai  Almaz  January 10, 2013         Sleep related leg cramps 01/10/2013     Priority: Medium     Improve with gabapentin       GBS (Guillain-Jackson syndrome) (H) 10/08/2011     Priority: Medium     Hospitalized 10/1/2011 - 10/8/2011  In acute rehab 10/9/2011 - 10/18/2011       Hyperlipidemia LDL goal <130 10/31/2010     Priority: Medium     GERD (gastroesophageal reflux disease) 10/27/2009     Priority: Medium     Morbid obesity (H) 10/27/2009     Priority: Medium     Tobacco use disorder 08/20/2008     Priority: Medium     Very rare use of cigarette, does use some e-cigarettes.  4/24/16 -- patient stopped all nicotine a few weeks ago        Past Medical History:    No past medical history on file.    Past Surgical History:    Past Surgical History:   Procedure Laterality Date     ARTHROPLASTY KNEE Right 2/18/2015    Procedure: ARTHROPLASTY KNEE;  Surgeon: Zelalem Mendez MD;  Location: [facility]     ARTHROPLASTY KNEE Left 4/5/2017    Procedure: ARTHROPLASTY KNEE;  Surgeon: Zelalem Mendez MD;  Location: [facility]     BUNIONECTOMY VALENTINE AND REUBEN, COMBINED  3/25/2011    COMBINED BUNIONECTOMY VALENTINE AND REUBEN performed by TRISTON SEWELL at [facility]     CYSTOSCOPY  5/20/2013    Procedure: CYSTOSCOPY;;  Surgeon: Adan Morrison MD;  Location: [facility]     REMOVE HARDWARE FOOT  2/15/2012    Procedure:REMOVE HARDWARE FOOT; Removal of hardware left foot; Surgeon:TRISTON SEWELL; Location:[facility]     SLING TRANSVAGINAL  5/20/2013    Procedure: SLING TRANSVAGINAL;  Transvaginal taping, cystoscopy;  Surgeon: Adan Morrison MD;  Location: [facility]     TUBAL LIGATION       Family History:    Family History   Problem Relation Age of Onset     Diabetes Mother      Gastrointestinal Disease Father         colon polyps     Diabetes Maternal Grandmother      Social History:  Marital Status:   [4]  Social History     Tobacco Use     Smoking status: Former Smoker     Types: Cigarettes     Last attempt to quit:  7/18/2015     Years since quitting: 3.5     Smokeless tobacco: Never Used   Substance Use Topics     Alcohol use: Yes     Alcohol/week: 0.0 oz     Comment: occ     Drug use: No      Medications:      ascorbic acid (VITAMIN C) 500 MG tablet   Calcium Citrate-Vitamin D (CALCIUM + D PO)   cholecalciferol (VITAMIN D) 1000 UNIT tablet   Ferrous Sulfate (IRON SUPPLEMENT PO)   FISH OIL 1000 MG OR CAPS   MAGNESIUM PO   omeprazole (PRILOSEC) 40 MG capsule   POTASSIUM PO   vitamin E 400 UNIT capsule     Review of Systems   Constitutional: Negative for chills and fever.   Eyes: Negative for photophobia, pain, redness and visual disturbance.   Respiratory: Negative for cough, shortness of breath and wheezing.    Cardiovascular: Negative for chest pain.   Gastrointestinal: Negative for abdominal pain, diarrhea, nausea and vomiting.   Musculoskeletal: Positive for arthralgias (right shoulder). Negative for back pain and neck pain.   Skin: Negative for color change, rash and wound.   Neurological: Negative for dizziness, weakness, light-headedness, numbness and headaches.     Physical Exam   BP: (!) 169/99  Pulse: 92  Temp: 97  F (36.1  C)  Resp: 16  Weight: 108.9 kg (240 lb)  SpO2: 98 %  Physical Exam   Constitutional: She appears well-developed and well-nourished. No distress.   HENT:   Head: Normocephalic and atraumatic.   Eyes: Conjunctivae and EOM are normal. Pupils are equal, round, and reactive to light.   Neck: Normal range of motion. Neck supple.   Cardiovascular: Normal rate, regular rhythm and normal heart sounds. Exam reveals no gallop and no friction rub.   No murmur heard.  Pulses:       Radial pulses are 2+ on the right side.   Pulmonary/Chest: Effort normal and breath sounds normal. No respiratory distress. She has no wheezes. She has no rales. She exhibits no tenderness.   Musculoskeletal:        Right shoulder: She exhibits decreased range of motion, tenderness, bony tenderness and swelling. She exhibits no  deformity, no laceration and normal pulse.        Right elbow: She exhibits decreased range of motion (secondary to shoulder pain). She exhibits no swelling, no effusion, no deformity and no laceration. No tenderness found.   Skin: Skin is warm and dry. Bruising and ecchymosis noted. No abrasion, no laceration and no rash noted. No erythema.      ED Course        Procedures           Critical Care time:  none        Results for orders placed or performed during the hospital encounter of 02/05/19   Shoulder XR, 2 view, right    Narrative    SHOULDER TWO VIEWS RIGHT  2/5/2019 2:14 PM     HISTORY: Pain after fall    COMPARISON: None.      Impression    IMPRESSION: Fracture through the proximal humerus, somewhat  transverse, and more in the surgical neck of the humerus as opposed to  anatomical neck. There may be associated impaction at the inferior  aspect of the glenoid. Actual glenohumeral articulation difficult to  assess on scapular Y radiograph. Distraction of the proximal fracture  fragment laterally measuring approximately 8 mm.    CHRIS ROY MD   Humerus XR, G/E 2 views, right    Narrative    This exam was marked as non-reportable because it will not be read by a   radiologist or a [facility] non-radiologist provider.               Medications   HYDROmorphone (PF) (DILAUDID) injection 0.5 mg (0.5 mg Intravenous Given 2/5/19 1337)   HYDROmorphone (PF) (DILAUDID) injection 0.5 mg (0.5 mg Intravenous Given 2/5/19 7019)       Donnie independently reviewed x-ray images stated that this was generally treated nonoperatively recommended sling, follow-up with Ortho patient within the next week.  Recommend to remind patient that we will have secondary distal swelling/edema    Assessments & Plan (with Medical Decision Making)     I have reviewed the nursing notes.    I have reviewed the findings, diagnosis, plan and need for follow up with the patient.          Medication List      ASK your doctor about these  medications    oxyCODONE-acetaminophen 5-325 MG tablet  Commonly known as:  PERCOCET  1 tablet, Oral, EVERY 4 HOURS PRN  Ask about: Should I take this medication?          Final diagnoses:   Other closed displaced fracture of proximal end of right humerus, initial encounter     63-year-old female presents to the emergency department with concern over right shoulder pain after she sustained a fall earlier today.  She had a stable vital signs upon arrival.  Physical exam findings as described above were significant for decreased range of motion of the right shoulder with localized swelling, tenderness to palpation, ecchymosis.  Distal neurovascular status was intact.  There is no deformity noted.  Patient did have x-ray which showed a fracture through the proximal humerus which was somewhat transverse and more in the surgical neck as opposed to the anatomic neck with possibility of associated impaction at the inferior aspect of the glenoid and with distraction of the proximal fracture fragment laterally measuring up to 8 mm.  I did discuss case with orthopedist on call Dr. Fields who independently examined x-ray images and stated that generally these would be treated nonoperatively recommended sling, follow-up with Ortho within the next week.  Required 2 doses of Dilaudid in the department for pain control.  She was discharged home stable in sling with prescription for oxycodone to be used as needed for pain control.  Follow-up with Orth O as previously directed, worrisome reasons to return to the ER sooner discussed.     Disclaimer: This note consists of symbols derived from keyboarding, dictation, and/or voice recognition software. As a result, there may be errors in the script that have gone undetected.  Please consider this when interpreting information found in the chart.    2/5/2019   [facility] EMERGENCY DEPARTMENT     Rachael Fuentes PA-C  02/10/19 2145

## 2019-02-05 NOTE — ED NOTES
Pt present with R arm/shoulder pain after fall on the ice.  No LOC and no blood thinners.  Radial pulses are + and =.  Unable to palpate R radial P, but it is easily found on doppler.

## 2019-02-05 NOTE — ED AVS SNAPSHOT
[facility] Emergency Department  [address] [facility] [address]  Phone:  [phone]  Fax:  [phone]                                    Mirian Cruz   MRN: [id number]    Department:  [facility] Emergency Department   Date of Visit:  2/5/2019           After Visit Summary Signature Page    I have received my discharge instructions, and my questions have been answered. I have discussed any challenges I see with this plan with the nurse or doctor.    ..........................................................................................................................................  Patient/Patient Representative Signature      ..........................................................................................................................................  Patient Representative Print Name and Relationship to Patient    ..................................................               ................................................  Date                                   Time    ..........................................................................................................................................  Reviewed by Signature/Title    ...................................................              ..............................................  Date                                               Time          22EPIC Rev 08/18

## 2019-02-25 ENCOUNTER — TRANSFERRED RECORDS (OUTPATIENT)
Dept: HEALTH INFORMATION MANAGEMENT | Facility: CLINIC | Age: 64
End: 2019-02-25

## 2019-03-21 ENCOUNTER — TRANSFERRED RECORDS (OUTPATIENT)
Dept: HEALTH INFORMATION MANAGEMENT | Facility: CLINIC | Age: 64
End: 2019-03-21

## 2019-03-27 ENCOUNTER — HOSPITAL ENCOUNTER (OUTPATIENT)
Dept: PHYSICAL THERAPY | Facility: CLINIC | Age: 64
Setting detail: THERAPIES SERIES
End: 2019-03-27
Attending: ORTHOPAEDIC SURGERY
Payer: COMMERCIAL

## 2019-03-27 PROCEDURE — 97161 PT EVAL LOW COMPLEX 20 MIN: CPT | Mod: GP | Performed by: PHYSICAL THERAPIST

## 2019-03-27 PROCEDURE — 97110 THERAPEUTIC EXERCISES: CPT | Mod: GP | Performed by: PHYSICAL THERAPIST

## 2019-03-27 NOTE — PROGRESS NOTES
Mirian Cruz  1955 Physical Therapy Initial Evaluation  03/27/19 0800   General Information   Type of Visit Initial OP Ortho PT Evaluation   Start of Care Date 03/27/19   Referring Physician NELLIE Guajardo   Patient/Family Goals Statement Go back to work   Orders Evaluate and Treat   Orders Comment Begin working on PROM and progress as tolerated. May begin strengthening exercises once tolerating ROM.   Date of Order 03/21/19   Insurance Type Other  ([employer])   Medical Diagnosis Right proximal humerus fracture   Surgical/Medical history reviewed Yes   Precautions/Limitations no known precautions/limitations   Body Part(s)   Body Part(s) Shoulder   Presentation and Etiology   Pertinent history of current problem (include personal factors and/or comorbidities that impact the POC) Slipped on ice when dropping off rent check on February 5, 2019. Was in a sling up until 1 week ago. X-rays looked good at [facility] - healing well. Sees [facility] in 6 weeks. Will be returning to work in 2 days. Works as a cook. Restrictions right now - careful with reaching, 10 pound lifting restriction. Sleeping has been going ok. / Comorbidities - Guillan-Effingham syndrome (H), Tobacco use disorder   Impairments A. Pain;B. Decreased WB tolerance;D. Decreased ROM;F. Decreased strength and endurance   Functional Limitations perform required work activities;perform activities of daily living;perform desired leisure / sports activities   Symptom Location Right shoulder   How/Where did it occur With a fall   Onset date of current episode/exacerbation 02/05/19   Chronicity New   Pain rating (0-10 point scale) Best (/10);Worst (/10)   Best (/10) 0   Worst (/10) 6   Pain quality B. Dull;C. Aching   Frequency of pain/symptoms C. With activity   Pain/symptoms exacerbated by C. Lifting;G. Certain positions;H. Overhead reach   Pain/symptoms eased by C. Rest;E. Changing positions;F. Certain positions;I. OTC medication(s)   Prior Level of Function    Functional Level Prior Comment Ind   Current Level of Function   Patient role/employment history A. Employed   Employment Comments [employer]   [facility]   Home/community accessibility 0 stairs   Fall Risk Screen   Fall screen completed by PT   Have you fallen 2 or more times in the past year? No   Have you fallen and had an injury in the past year? Yes   Timed Up and Go score (seconds) 10   Is patient a fall risk? No   Shoulder Objective Findings   Side (if bilateral, select both right and left) Right   Posture Rounded shoulders. Forward head seated posture   Cervical Screen (ROM, quadrant) Symmetrical B. Some tightness reported in right UT.   Right Shoulder Flexion AROM 45 / Left - 137   Right Shoulder Abduction AROM 48 / Left - 134   Right Shoulder ER PROM 27 with UE at 30 degrees of abduction / Left - 85   Right Shoulder IR AROM To side of hip / Left - T10   Right Shoulder Flexion Strength <3/5 due to ROM deficits   Right Shoulder Abduction Strength <3/5 due to ROM deficits   Right Shoulder ER Strength <3/5 due to ROM deficits   Right Shoulder IR Strength <3/5 due to ROM deficits   Planned Therapy Interventions   Planned Therapy Interventions joint mobilization;manual therapy;ROM;strengthening;stretching;neuromuscular re-education   Planned Modality Interventions   Planned Modality Interventions Cryotherapy;Hot packs;TENS   Clinical Impression   Criteria for Skilled Therapeutic Interventions Met yes, treatment indicated   PT Diagnosis Humerus fracture leading to ROM and strength deficits and difficulty reaching.   Influenced by the following impairments ROM and strength deficits, Pain   Functional limitations due to impairments Difficulty reaching overhead, reaching behind back, Styling hair   Clinical Presentation Stable/Uncomplicated   Clinical Presentation Rationale 2 comorbidities impacting PT / 1 body system / Stable   Clinical Decision Making (Complexity) Low complexity   Therapy  Frequency (1-2 times / week)   Predicted Duration of Therapy Intervention (days/wks) 12 weeks   Risk & Benefits of therapy have been explained Yes   Patient, Family & other staff in agreement with plan of care Yes   Education Assessment   Preferred Learning Style Listening;Reading;Demonstration;Pictures/video   Barriers to Learning No barriers   ORTHO GOALS   PT Ortho Eval Goals 1;2;3;4;5;6   Ortho Goal 1   Goal Identifier HEP   Goal Description Pt will be independent in HEP in order to achieve and maintain long term treatment.   Target Date 04/27/19   Ortho Goal 2   Goal Identifier Reaching overhead   Goal Description Pt will be able to reach overhead with flexion within 5 degrees of uninvilved UE to grab items from a cupboard.   Target Date 06/19/19   Ortho Goal 3   Goal Identifier Hair   Goal Description Pt will be able to style her hair in the morning without having to sidebend cervical spine.   Target Date 05/22/19   Ortho Goal 4   Goal Identifier Seatbelt   Goal Description Pt will be able to fasten her seatbelt with a minimal increase in pain 1-2/10.   Target Date 05/22/19   Ortho Goal 5   Goal Identifier Work tasks   Goal Description Pt will be able to perform all work tasks with a minimal increase in shoulder pain 1-2/10.   Target Date 06/19/19   Ortho Goal 6   Goal Identifier Getting dressed   Goal Description Pt will have adequate IR ROM to put on a bra with minimal discomfort 1-2/10.   Target Date 06/19/19   Total Evaluation Time   PT Eval, Low Complexity Minutes (81577) 15     Brenden Sarah, PT, DPT

## 2019-03-29 ENCOUNTER — TELEPHONE (OUTPATIENT)
Dept: FAMILY MEDICINE | Facility: CLINIC | Age: 64
End: 2019-03-29

## 2019-03-29 DIAGNOSIS — Z12.11 SPECIAL SCREENING FOR MALIGNANT NEOPLASMS, COLON: Primary | ICD-10-CM

## 2019-03-29 NOTE — LETTER
[facility]  [address]  [facility] [address]  [phone]  March 29, 2019    Mirian Cruz  [address]  [facility] [address]    Dear Mirian,    We care about your health and have reviewed your health plan including your medical conditions, medication list, and lab results.  Based on this review, it is recommended that you follow up regarding the following health topic(s):     -Colon Cancer Screening     Your clinic record indicates that you are due for your yearly stool blood testing (FIT). Your last FIT was completed 12/4/2017. Enclosed is a FIT for you to complete. Please complete and mail back in the envelope provided in the packets.    If you have questions about this letter please contact your care team.       Healthy Regards,      Your Health Care Team   [facility]

## 2019-03-29 NOTE — TELEPHONE ENCOUNTER
Panel Management Review      Patient has the following on her problem list: None      Composite cancer screening  Chart review shows that this patient is due/due soon for the following Mammogram  Summary:    Patient is due/failing the following:   MAMMOGRAM    Action needed:   Pt to schedule mammogram    Type of outreach:    Sent letter.    Questions for provider review:    None                                                                                                                                    Ginny Lewis MA

## 2019-03-29 NOTE — LETTER
[facility]  [address]  [facility] [address]  [phone]  March 29, 2019    Mirian Cruz  [address]  [facility] [address]    Dear Mirian,    We care about your health and have reviewed your health plan including your medical conditions, medication list, and lab results.  Based on this review, it is recommended that you follow up regarding the following health topic(s):     -Breast Cancer Screening       This is a reminder that it is time to make your appointment for your annual mammogram.  You may make an appointment for your Digital Mammogram by calling our scheduling line at [phone] to schedule at one of our locations. Your last mammogram was 4/25/19.    If you are experiencing breast symptoms or concerns such as a new lump or breast pain you should first contact your health care provider before scheduling your mammogram. Your physician may want to see you prior to your visit.    We would like to take this opportunity to remind you that along with an annual mammogram, the [employer] Society recommends an annual breast examination by your physician or health care provider.    If you are a patient currently enrolled in the [place] [facility] Program or the [facility]ness Program, you must be seen by your provider for a clinical breast examination prior to your mammogram.    Please disregard this notice if you have already scheduled an appointment. Or, if you have had this done elsewhere, please have your records sent to the clinic.     Thank you and we look forward to seeing you in the near future for your annual screening mammogram.          Healthy Regards,      Your Health Care Team  [facility]

## 2019-04-01 ENCOUNTER — HOSPITAL ENCOUNTER (OUTPATIENT)
Dept: PHYSICAL THERAPY | Facility: CLINIC | Age: 64
Setting detail: THERAPIES SERIES
End: 2019-04-01
Attending: PHYSICIAN ASSISTANT
Payer: COMMERCIAL

## 2019-04-01 PROCEDURE — 97110 THERAPEUTIC EXERCISES: CPT | Mod: GP | Performed by: PHYSICAL THERAPIST

## 2019-04-04 ENCOUNTER — OFFICE VISIT (OUTPATIENT)
Dept: FAMILY MEDICINE | Facility: CLINIC | Age: 64
End: 2019-04-04
Payer: COMMERCIAL

## 2019-04-04 ENCOUNTER — ANCILLARY PROCEDURE (OUTPATIENT)
Dept: GENERAL RADIOLOGY | Facility: CLINIC | Age: 64
End: 2019-04-04
Attending: NURSE PRACTITIONER
Payer: COMMERCIAL

## 2019-04-04 ENCOUNTER — HOSPITAL ENCOUNTER (OUTPATIENT)
Dept: PHYSICAL THERAPY | Facility: CLINIC | Age: 64
Setting detail: THERAPIES SERIES
End: 2019-04-04
Attending: PHYSICIAN ASSISTANT
Payer: COMMERCIAL

## 2019-04-04 VITALS
TEMPERATURE: 98.3 F | HEIGHT: 65 IN | HEART RATE: 107 BPM | OXYGEN SATURATION: 99 % | BODY MASS INDEX: 40.22 KG/M2 | WEIGHT: 241.4 LBS | DIASTOLIC BLOOD PRESSURE: 74 MMHG | RESPIRATION RATE: 16 BRPM | SYSTOLIC BLOOD PRESSURE: 126 MMHG

## 2019-04-04 DIAGNOSIS — K21.00 GASTROESOPHAGEAL REFLUX DISEASE WITH ESOPHAGITIS: ICD-10-CM

## 2019-04-04 DIAGNOSIS — M25.551 BILATERAL HIP PAIN: Primary | ICD-10-CM

## 2019-04-04 DIAGNOSIS — M25.551 BILATERAL HIP PAIN: ICD-10-CM

## 2019-04-04 DIAGNOSIS — N30.00 ACUTE CYSTITIS WITHOUT HEMATURIA: ICD-10-CM

## 2019-04-04 DIAGNOSIS — R30.0 DYSURIA: ICD-10-CM

## 2019-04-04 DIAGNOSIS — M25.552 BILATERAL HIP PAIN: Primary | ICD-10-CM

## 2019-04-04 DIAGNOSIS — M25.552 BILATERAL HIP PAIN: ICD-10-CM

## 2019-04-04 DIAGNOSIS — R82.90 NONSPECIFIC FINDING ON EXAMINATION OF URINE: ICD-10-CM

## 2019-04-04 LAB
ALBUMIN UR-MCNC: NEGATIVE MG/DL
APPEARANCE UR: CLEAR
BACTERIA #/AREA URNS HPF: ABNORMAL /HPF
BILIRUB UR QL STRIP: NEGATIVE
COLOR UR AUTO: YELLOW
GLUCOSE UR STRIP-MCNC: NEGATIVE MG/DL
HGB UR QL STRIP: NEGATIVE
KETONES UR STRIP-MCNC: NEGATIVE MG/DL
LEUKOCYTE ESTERASE UR QL STRIP: ABNORMAL
NITRATE UR QL: NEGATIVE
NON-SQ EPI CELLS #/AREA URNS LPF: ABNORMAL /LPF
PH UR STRIP: 7 PH (ref 5–7)
RBC #/AREA URNS AUTO: ABNORMAL /HPF
SOURCE: ABNORMAL
SP GR UR STRIP: 1.02 (ref 1–1.03)
UROBILINOGEN UR STRIP-ACNC: 1 EU/DL (ref 0.2–1)
WBC #/AREA URNS AUTO: ABNORMAL /HPF

## 2019-04-04 PROCEDURE — 87086 URINE CULTURE/COLONY COUNT: CPT | Performed by: NURSE PRACTITIONER

## 2019-04-04 PROCEDURE — 99214 OFFICE O/P EST MOD 30 MIN: CPT | Performed by: NURSE PRACTITIONER

## 2019-04-04 PROCEDURE — 81001 URINALYSIS AUTO W/SCOPE: CPT | Performed by: NURSE PRACTITIONER

## 2019-04-04 PROCEDURE — 87186 SC STD MICRODIL/AGAR DIL: CPT | Performed by: NURSE PRACTITIONER

## 2019-04-04 PROCEDURE — 97110 THERAPEUTIC EXERCISES: CPT | Mod: GP | Performed by: PHYSICAL THERAPIST

## 2019-04-04 PROCEDURE — 73523 X-RAY EXAM HIPS BI 5/> VIEWS: CPT | Mod: FY

## 2019-04-04 PROCEDURE — 87088 URINE BACTERIA CULTURE: CPT | Performed by: NURSE PRACTITIONER

## 2019-04-04 RX ORDER — OMEPRAZOLE 40 MG/1
40 CAPSULE, DELAYED RELEASE ORAL DAILY
Qty: 90 CAPSULE | Refills: 3 | Status: SHIPPED | OUTPATIENT
Start: 2019-04-04 | End: 2020-04-15

## 2019-04-04 RX ORDER — CIPROFLOXACIN 250 MG/1
250 TABLET, FILM COATED ORAL 2 TIMES DAILY
Qty: 10 TABLET | Refills: 0 | Status: SHIPPED | OUTPATIENT
Start: 2019-04-04 | End: 2019-04-09

## 2019-04-04 ASSESSMENT — MIFFLIN-ST. JEOR: SCORE: 1652.92

## 2019-04-04 NOTE — PROGRESS NOTES
SUBJECTIVE:   Mirian Cruz is a 63 year old female who presents to clinic today for the following health issues:    Requesting refill on Omeprazole. Upper GI 20009- esophagitis. Denies dysphagia or abdominal pain.    Joint Pain    Onset: 2 years     Description:   Location: left hip and right hip, left is worse   Character: Dull ache    Intensity: 6-7/10, only hurts with ambulation more than a block or feels like it wants to lock up when she moves a certain way like getting into cars.     Progression of Symptoms: same    Accompanying Signs & Symptoms:  Other symptoms: numbness in left leg     History:   Previous similar pain: no       Precipitating factors:   Trauma or overuse: no, has had bilateral knee replacements      Alleviating factors:  Improved by: rest/inactivity    Therapies Tried and outcome: none       URINARY TRACT SYMPTOMS      Duration: 1 month     Description  dysuria, frequency, urgency, odor and incontinence when she doesn't make it to the bathroom on time     Intensity:  Moderate, improving     Accompanying signs and symptoms:  Fever/chills: YES- last week but did have head cold   Flank pain no   Nausea and vomiting: no   Vaginal symptoms: none  Abdominal/Pelvic Pain: no     History  History of frequent UTI's: YES  History of kidney stones: YES  Sexually Active: no   Possibility of pregnancy: No    Precipitating or alleviating factors: None    Therapies tried and outcome: cranberry pills, azo   Outcome: moderate relief      Problem list and histories reviewed & adjusted, as indicated.  Additional history: as documented    Patient Active Problem List   Diagnosis     Tobacco use disorder     GERD (gastroesophageal reflux disease)     Morbid obesity (H)     Hyperlipidemia LDL goal <130     GBS (Guillain-Bristow syndrome) (H)     Advanced directives, counseling/discussion     Sleep related leg cramps     Urinary, incontinence, stress female     Urethral hypermobility     OA (osteoarthritis) of  knee     Status post total right knee replacement     Prediabetes     Status post total left knee replacement     Left knee DJD     Past Surgical History:   Procedure Laterality Date     ARTHROPLASTY KNEE Right 2/18/2015    Procedure: ARTHROPLASTY KNEE;  Surgeon: Zelalem Mendez MD;  Location: [facility]     ARTHROPLASTY KNEE Left 4/5/2017    Procedure: ARTHROPLASTY KNEE;  Surgeon: Zelalem Mendez MD;  Location: [facility]     BUNIONECTOMY CHEVRON AND REUBEN, COMBINED  3/25/2011    COMBINED BUNIONECTOMY CHEVRON AND REUBEN performed by TRISTON SEWELL at [facility]     CYSTOSCOPY  5/20/2013    Procedure: CYSTOSCOPY;;  Surgeon: Adan Morrison MD;  Location: [facility]     REMOVE HARDWARE FOOT  2/15/2012    Procedure:REMOVE HARDWARE FOOT; Removal of hardware left foot; Surgeon:TRISTON SEWELL; Location:[facility]     SLING TRANSVAGINAL  5/20/2013    Procedure: SLING TRANSVAGINAL;  Transvaginal taping, cystoscopy;  Surgeon: Adan Morrison MD;  Location: [facility]     TUBAL LIGATION         Social History     Tobacco Use     Smoking status: Current Some Day Smoker     Types: Cigarettes     Last attempt to quit: 7/18/2015     Years since quitting: 3.7     Smokeless tobacco: Never Used     Tobacco comment: 5 cigarettes a week    Substance Use Topics     Alcohol use: Yes     Alcohol/week: 0.0 oz     Comment: occ     Family History   Problem Relation Age of Onset     Diabetes Mother      Gastrointestinal Disease Father         colon polyps     Diabetes Maternal Grandmother            Reviewed and updated as needed this visit by clinical staff  Tobacco  Allergies  Meds  Med Hx  Surg Hx  Fam Hx  Soc Hx      Reviewed and updated as needed this visit by Provider         ROS:  Constitutional, HEENT, cardiovascular, pulmonary, gi and gu systems are negative, except as otherwise noted.    OBJECTIVE:     /74 (BP Location: Right arm, Patient Position: Sitting, Cuff Size: Adult Large)   Pulse 107   Temp 98.3  F  "(36.8  C) (Tympanic)   Resp 16   Ht 1.638 m (5' 4.5\")   Wt 109.5 kg (241 lb 6.4 oz)   SpO2 99%   BMI 40.80 kg/m    Body mass index is 40.8 kg/m .  GENERAL: healthy, alert and no distress  ABDOMEN: soft, nontender  MS: bilateral lower extremities with 5/5 strength, normal ROM. Pain with internal rotation and lateral abduction bilateral. No tenderness to palpation over greater trochanters.   SKIN: no suspicious lesions or rashes  NEURO: Normal strength and tone, mentation intact and speech normal  BACK: no CVA tenderness, no paralumbar tenderness  PSYCH: mentation appears normal, affect normal/bright    Diagnostic Test Results:  Results for orders placed or performed in visit on 04/04/19 (from the past 24 hour(s))   *UA reflex to Microscopic and Culture ([place] and [facility] (except [place] and [place])   Result Value Ref Range    Color Urine Yellow     Appearance Urine Clear     Glucose Urine Negative NEG^Negative mg/dL    Bilirubin Urine Negative NEG^Negative    Ketones Urine Negative NEG^Negative mg/dL    Specific Gravity Urine 1.020 1.003 - 1.035    Blood Urine Negative NEG^Negative    pH Urine 7.0 5.0 - 7.0 pH    Protein Albumin Urine Negative NEG^Negative mg/dL    Urobilinogen Urine 1.0 0.2 - 1.0 EU/dL    Nitrite Urine Negative NEG^Negative    Leukocyte Esterase Urine Moderate (A) NEG^Negative    Source Midstream Urine    Urine Microscopic   Result Value Ref Range    WBC Urine 10-25 (A) OTO5^0 - 5 /HPF    RBC Urine O - 2 OTO2^O - 2 /HPF    Squamous Epithelial /LPF Urine Few FEW^Few /LPF    Bacteria Urine Moderate (A) NEG^Negative /HPF     Xray - pending radiology review.    ASSESSMENT/PLAN:       ICD-10-CM    1. Bilateral hip pain M25.551 XR Pelvis w Hip Bilateral G/E 2 Views    M25.552 ORTHOPEDICS ADULT REFERRAL     [facility] PT, HAND, AND CHIROPRACTIC REFERRAL   2. Dysuria R30.0 *UA reflex to Microscopic and Culture ([place] and [facility] (except [place] and [place])     Urine Microscopic " "  3. Nonspecific finding on examination of urine R82.90 Urine Culture Aerobic Bacterial   4. Gastroesophageal reflux disease with esophagitis K21.0 omeprazole (PRILOSEC) 40 MG DR capsule    Upper GI- 2009, esophagitis, to be on anti reflux meds indefinately.    5. Acute cystitis without hematuria N30.00 ciprofloxacin (CIPRO) 250 MG tablet       CONSULTATION/REFERRAL to physical therapy and ORTHO- patient interested in joint injection    FUTURE APPOINTMENTS:       - Follow up in 1 week for persistent urinary symptoms, sooner for new or worsening symptoms.     Patient Instructions       Patient Education     Bladder Infection, Female (Adult)    Urine is normally doesn't have any bacteria in it. But bacteria can get into the urinary tract from the skin around the rectum. Or they can travel in the blood from elsewhere in the body. Once they are in your urinary tract, they can cause infection in the urethra (urethritis), the bladder (cystitis), or the kidneys (pyelonephritis).  The most common place for an infection is in the bladder. This is called a bladder infection. This is one of the most common infections in women. Most bladder infections are easily treated. They are not serious unless the infection spreads to the kidney.  The phrases \"bladder infection,\" \"UTI,\" and \"cystitis\" are often used to describe the same thing. But they are not always the same. Cystitis is an inflammation of the bladder. The most common cause of cystitis is an infection.  Symptoms  The infection causes inflammation in the urethra and bladder. This causes many of the symptoms. The most common symptoms of a bladder infection are:    Pain or burning when urinating    Having to urinate more often than usual    Urgent need to urinate    Only a small amount of urine comes out    Blood in urine    Abdominal discomfort. This is usually in the lower abdomen above the pubic bone.    Cloudy urine    Strong- or bad-smelling urine    Unable to urinate " (urinary retention)    Unable to hold urine in (urinary incontinence)    Fever    Loss of appetite    Confusion (in older adults)  Causes  Bladder infections are not contagious. You can't get one from someone else, from a toilet seat, or from sharing a bath.  The most common cause of bladder infections is bacteria from the bowels. The bacteria get onto the skin around the opening of the urethra. From there, they can get into the urine and travel up to the bladder, causing inflammation and infection. This usually happens because of:    Wiping improperly after urinating. Always wipe from front to back.    Bowel incontinence    Pregnancy    Procedures such as having a catheter inserted    Older age    Not emptying your bladder. This can allow bacteria a chance to grow in your urine.    Dehydration    Constipation    Sex    Use of a diaphragm for birth control   Treatment  Bladder infections are diagnosed by a urine test. They are treated with antibiotics and usually clear up quickly without complications. Treatment helps prevent a more serious kidney infection.  Medicines  Medicines can help in the treatment of a bladder infection:    Take antibiotics until they are used up, even if you feel better. It is important to finish them to make sure the infection has cleared.    You can use acetaminophen or ibuprofen for pain, fever, or discomfort, unless another medicine was prescribed. If you have chronic liver or kidney disease, talk with your healthcare provider before using these medicines. Also talk with your provider if you've ever had a stomach ulcer or gastrointestinal bleeding, or are taking blood-thinner medicines.    If you are given phenazopydridine to reduce burning with urination, it will cause your urine to become a bright orange color. This can stain clothing.  Care and prevention  These self-care steps can help prevent future infections:    Drink plenty of fluids to prevent dehydration and flush out your  bladder. Do this unless you must restrict fluids for other health reasons, or your doctor told you not to.    Proper cleaning after going to the bathroom is important. Wipe from front to back after using the toilet to prevent the spread of bacteria.    Urinate more often. Don't try to hold urine in for a long time.    Wear loose-fitting clothes and cotton underwear. Avoid tight-fitting pants.    Improve your diet and prevent constipation. Eat more fresh fruit and vegetables, and fiber, and less junk and fatty foods.    Avoid sex until your symptoms are gone.    Avoid caffeine, alcohol, and spicy foods. These can irritate your bladder.    Urinate right after intercourse to flush out your bladder.    If you use birth control pills and have frequent bladder infections, discuss it with your doctor.  Follow-up care  Call your healthcare provider if all symptoms are not gone after 3 days of treatment. This is especially important if you have repeat infections.  If a culture was done, you will be told if your treatment needs to be changed. If directed, you can call to find out the results.  If X-rays were done, you will be told if the results will affect your treatment.  Call 911  Call 911 if any of the following occur:    Trouble breathing    Hard to wake up or confusion    Fainting or loss of consciousness    Rapid heart rate  When to seek medical advice  Call your healthcare provider right away if any of these occur:    Fever of 100.4 F (38.0 C) or higher, or as directed by your healthcare provider    Symptoms are not better by the third day of treatment    Back or belly (abdominal) pain that gets worse    Repeated vomiting, or unable to keep medicine down    Weakness or dizziness    Vaginal discharge    Pain, redness, or swelling in the outer vaginal area (labia)  Date Last Reviewed: 10/1/2016    [id number] [employer]. [address] [place] [address], [place] [address]. All rights reserved. This information is not  intended as a substitute for professional medical care. Always follow your healthcare professional's instructions.           Patient Education     Understanding Osteoarthritis of the Hip    A joint is a place where two bones meet. The hip is a ball-and-socket joint. It is formed where the ball at the top of the thighbone (femur) rests in the socket in the pelvic bone. The hip joint allows the leg to move freely in many directions.  Hip osteoarthritis is a condition where parts of the hip joint wear out. It can lead to pain, stiffness, and limited movement.   What is osteoarthritis?  All joints contain a smooth tissue called cartilage. Cartilage cushions the ends of bones, helping them glide against each other. Hip osteoarthritis occurs when cartilage in the hip joint begins to break down and wear away. The ball and socket bones may then become exposed and rub together. The cartilage may become rough and pitted and may begin to wear away. This prevents smooth movement of the joint and can lead to pain.  Causes of osteoarthritis of the hip  Causes can include:    Wear and tear from normal use of the hip over time    Overuse of the hip during sports or work activities    Being overweight. This increases stress on the hip joint.    Injury to the hip    Infection of the hip joint  Symptoms of osteoarthritis of the hip  The main symptom of hip osteoarthritis is pain. The pain is most often felt in the groin area. It may also travel down the leg to the knee or to the back of the hip. The pain usually gets worse with activity, such as walking or climbing stairs. The pain may get better with rest. The joint may also be stiff first thing in the morning or after periods of sitting or lying down. Stiffness usually gets better with movement.  Treating osteoarthritis of the hip  Osteoarthritis is a long-term condition. Treatment usually focuses on managing symptoms. Treatment may include:    Over-the-counter or prescription  medicines taken by mouth to help relieve pain and swelling    Injections of medicine into the joint to help relieve symptoms for a time    A weight-loss plan if you are overweight    A plan of physical therapy and exercises to improve strength and flexibility around the joint    Cold or heat packs help relieve pain and stiffness    Assistive devices that help with movement, such as a cane or a walker    Assistive devices that make activities of daily life easier, such as raised toilet seats or shower bars  If other treatments don t do enough to relieve severe symptoms, you may need surgery to replace the joint. This surgery replaces the hip joint with an artificial joint. It can help relieve pain and stiffness and improve function of the hip.     When to call your healthcare provider  Call your healthcare provider right away if you have any of these:    Fever of 100.4 F (38 C) or higher, or as directed    Symptoms that don t get better with prescribed medicines or get worse    New symptoms   Date Last Reviewed: 3/10/2016    [id number] [employer]. [address] [place] [address], [place] [address]. All rights reserved. This information is not intended as a substitute for professional medical care. Always follow your healthcare professional's instructions.               ANNALEE Pereyra [facility]

## 2019-04-04 NOTE — LETTER
[facility]  [address]  [facility] [address]  Phone: [phone]      4/8/2019     Mirian Cruz  [address]  [facility] [address]      Dear Mirian:    Thank you for allowing me to participate in your care. Your recent test results were reviewed and listed below.  UTI treated with appropriate antibiotic, please let us know if you are still having symptoms. Please let us know if you have any questions.     Your results are provided below for your review  Results for orders placed or performed in visit on 04/04/19   *UA reflex to Microscopic and Culture ([place] and [facility] (except [place] and [place])   Result Value Ref Range    Color Urine Yellow     Appearance Urine Clear     Glucose Urine Negative NEG^Negative mg/dL    Bilirubin Urine Negative NEG^Negative    Ketones Urine Negative NEG^Negative mg/dL    Specific Gravity Urine 1.020 1.003 - 1.035    Blood Urine Negative NEG^Negative    pH Urine 7.0 5.0 - 7.0 pH    Protein Albumin Urine Negative NEG^Negative mg/dL    Urobilinogen Urine 1.0 0.2 - 1.0 EU/dL    Nitrite Urine Negative NEG^Negative    Leukocyte Esterase Urine Moderate (A) NEG^Negative    Source Midstream Urine    Urine Microscopic   Result Value Ref Range    WBC Urine 10-25 (A) OTO5^0 - 5 /HPF    RBC Urine O - 2 OTO2^O - 2 /HPF    Squamous Epithelial /LPF Urine Few FEW^Few /LPF    Bacteria Urine Moderate (A) NEG^Negative /HPF   Urine Culture Aerobic Bacterial   Result Value Ref Range    Specimen Description Midstream Urine     Special Requests Specimen received in preservative     Culture Micro >100,000 colonies/mL  Escherichia coli   (A)        Susceptibility    Escherichia coli - JOSE     AMPICILLIN <=2 Sensitive ug/mL     CEFAZOLIN* <=4 Sensitive ug/mL      * Cefazolin JOSE breakpoints are for the treatment of uncomplicated urinary tract infections.  For the treatment of systemic infections, please contact the laboratory for additional testing.      CEFOXITIN <=4 Sensitive ug/mL     CEFTAZIDIME <=1 Sensitive ug/mL     CEFTRIAXONE <=1 Sensitive ug/mL     CIPROFLOXACIN <=0.25 Sensitive ug/mL     GENTAMICIN <=1 Sensitive ug/mL     LEVOFLOXACIN <=0.12 Sensitive ug/mL     NITROFURANTOIN <=16 Sensitive ug/mL     TOBRAMYCIN <=1 Sensitive ug/mL     Trimethoprim/Sulfa <=1/19 Sensitive ug/mL     AMPICILLIN/SULBACTAM <=2 Sensitive ug/mL     Piperacillin/Tazo <=4 Sensitive ug/mL     CEFEPIME <=1 Sensitive ug/mL                 Thank you for choosing [facility]. As a result, please continue with the treatment plan discussed in the office. Return as discussed or sooner if symptoms worsen or fail to improve.     If you have any further questions or concerns, please do not hesitate to contact us.      Sincerely,        ANNALEE Hussein CNP

## 2019-04-04 NOTE — PATIENT INSTRUCTIONS
"  Patient Education     Bladder Infection, Female (Adult)    Urine is normally doesn't have any bacteria in it. But bacteria can get into the urinary tract from the skin around the rectum. Or they can travel in the blood from elsewhere in the body. Once they are in your urinary tract, they can cause infection in the urethra (urethritis), the bladder (cystitis), or the kidneys (pyelonephritis).  The most common place for an infection is in the bladder. This is called a bladder infection. This is one of the most common infections in women. Most bladder infections are easily treated. They are not serious unless the infection spreads to the kidney.  The phrases \"bladder infection,\" \"UTI,\" and \"cystitis\" are often used to describe the same thing. But they are not always the same. Cystitis is an inflammation of the bladder. The most common cause of cystitis is an infection.  Symptoms  The infection causes inflammation in the urethra and bladder. This causes many of the symptoms. The most common symptoms of a bladder infection are:    Pain or burning when urinating    Having to urinate more often than usual    Urgent need to urinate    Only a small amount of urine comes out    Blood in urine    Abdominal discomfort. This is usually in the lower abdomen above the pubic bone.    Cloudy urine    Strong- or bad-smelling urine    Unable to urinate (urinary retention)    Unable to hold urine in (urinary incontinence)    Fever    Loss of appetite    Confusion (in older adults)  Causes  Bladder infections are not contagious. You can't get one from someone else, from a toilet seat, or from sharing a bath.  The most common cause of bladder infections is bacteria from the bowels. The bacteria get onto the skin around the opening of the urethra. From there, they can get into the urine and travel up to the bladder, causing inflammation and infection. This usually happens because of:    Wiping improperly after urinating. Always wipe " from front to back.    Bowel incontinence    Pregnancy    Procedures such as having a catheter inserted    Older age    Not emptying your bladder. This can allow bacteria a chance to grow in your urine.    Dehydration    Constipation    Sex    Use of a diaphragm for birth control   Treatment  Bladder infections are diagnosed by a urine test. They are treated with antibiotics and usually clear up quickly without complications. Treatment helps prevent a more serious kidney infection.  Medicines  Medicines can help in the treatment of a bladder infection:    Take antibiotics until they are used up, even if you feel better. It is important to finish them to make sure the infection has cleared.    You can use acetaminophen or ibuprofen for pain, fever, or discomfort, unless another medicine was prescribed. If you have chronic liver or kidney disease, talk with your healthcare provider before using these medicines. Also talk with your provider if you've ever had a stomach ulcer or gastrointestinal bleeding, or are taking blood-thinner medicines.    If you are given phenazopydridine to reduce burning with urination, it will cause your urine to become a bright orange color. This can stain clothing.  Care and prevention  These self-care steps can help prevent future infections:    Drink plenty of fluids to prevent dehydration and flush out your bladder. Do this unless you must restrict fluids for other health reasons, or your doctor told you not to.    Proper cleaning after going to the bathroom is important. Wipe from front to back after using the toilet to prevent the spread of bacteria.    Urinate more often. Don't try to hold urine in for a long time.    Wear loose-fitting clothes and cotton underwear. Avoid tight-fitting pants.    Improve your diet and prevent constipation. Eat more fresh fruit and vegetables, and fiber, and less junk and fatty foods.    Avoid sex until your symptoms are gone.    Avoid caffeine,  alcohol, and spicy foods. These can irritate your bladder.    Urinate right after intercourse to flush out your bladder.    If you use birth control pills and have frequent bladder infections, discuss it with your doctor.  Follow-up care  Call your healthcare provider if all symptoms are not gone after 3 days of treatment. This is especially important if you have repeat infections.  If a culture was done, you will be told if your treatment needs to be changed. If directed, you can call to find out the results.  If X-rays were done, you will be told if the results will affect your treatment.  Call 911  Call 911 if any of the following occur:    Trouble breathing    Hard to wake up or confusion    Fainting or loss of consciousness    Rapid heart rate  When to seek medical advice  Call your healthcare provider right away if any of these occur:    Fever of 100.4 F (38.0 C) or higher, or as directed by your healthcare provider    Symptoms are not better by the third day of treatment    Back or belly (abdominal) pain that gets worse    Repeated vomiting, or unable to keep medicine down    Weakness or dizziness    Vaginal discharge    Pain, redness, or swelling in the outer vaginal area (labia)  Date Last Reviewed: 10/1/2016    1593-6158 [employer]. [address] [place] [address]. All rights reserved. This information is not intended as a substitute for professional medical care. Always follow your healthcare professional's instructions.           Patient Education     Understanding Osteoarthritis of the Hip    A joint is a place where two bones meet. The hip is a ball-and-socket joint. It is formed where the ball at the top of the thighbone (femur) rests in the socket in the pelvic bone. The hip joint allows the leg to move freely in many directions.  Hip osteoarthritis is a condition where parts of the hip joint wear out. It can lead to pain, stiffness, and limited movement.   What is  osteoarthritis?  All joints contain a smooth tissue called cartilage. Cartilage cushions the ends of bones, helping them glide against each other. Hip osteoarthritis occurs when cartilage in the hip joint begins to break down and wear away. The ball and socket bones may then become exposed and rub together. The cartilage may become rough and pitted and may begin to wear away. This prevents smooth movement of the joint and can lead to pain.  Causes of osteoarthritis of the hip  Causes can include:    Wear and tear from normal use of the hip over time    Overuse of the hip during sports or work activities    Being overweight. This increases stress on the hip joint.    Injury to the hip    Infection of the hip joint  Symptoms of osteoarthritis of the hip  The main symptom of hip osteoarthritis is pain. The pain is most often felt in the groin area. It may also travel down the leg to the knee or to the back of the hip. The pain usually gets worse with activity, such as walking or climbing stairs. The pain may get better with rest. The joint may also be stiff first thing in the morning or after periods of sitting or lying down. Stiffness usually gets better with movement.  Treating osteoarthritis of the hip  Osteoarthritis is a long-term condition. Treatment usually focuses on managing symptoms. Treatment may include:    Over-the-counter or prescription medicines taken by mouth to help relieve pain and swelling    Injections of medicine into the joint to help relieve symptoms for a time    A weight-loss plan if you are overweight    A plan of physical therapy and exercises to improve strength and flexibility around the joint    Cold or heat packs help relieve pain and stiffness    Assistive devices that help with movement, such as a cane or a walker    Assistive devices that make activities of daily life easier, such as raised toilet seats or shower bars  If other treatments don t do enough to relieve severe symptoms,  you may need surgery to replace the joint. This surgery replaces the hip joint with an artificial joint. It can help relieve pain and stiffness and improve function of the hip.     When to call your healthcare provider  Call your healthcare provider right away if you have any of these:    Fever of 100.4 F (38 C) or higher, or as directed    Symptoms that don t get better with prescribed medicines or get worse    New symptoms   Date Last Reviewed: 3/10/2016    6325-2809 [employer]. [address] [place], [place] [address]. All rights reserved. This information is not intended as a substitute for professional medical care. Always follow your healthcare professional's instructions.

## 2019-04-05 LAB
BACTERIA SPEC CULT: ABNORMAL
Lab: ABNORMAL
SPECIMEN SOURCE: ABNORMAL

## 2019-04-05 NOTE — RESULT ENCOUNTER NOTE
Loly Vela    Radiology report shows mild arthritis in both hips, worse in left, and moderate arthritis in low back. Follow up with ortho if you would like to proceed with trial of steroid injection. Please let us know if you have any questions.     Thanks for coming in to the clinic.    ANNALEE Hussein CNP

## 2019-04-08 NOTE — RESULT ENCOUNTER NOTE
Please send patient letter notifying of labs/imaging and include provider message.  UTI treated with appropriate antibiotic, please let us know if you are still having symptoms. Please let us know if you have any questions.      Thanks,  ANNALEE Hussein CNP

## 2019-04-15 ENCOUNTER — HOSPITAL ENCOUNTER (OUTPATIENT)
Dept: PHYSICAL THERAPY | Facility: CLINIC | Age: 64
Setting detail: THERAPIES SERIES
End: 2019-04-15
Attending: PHYSICIAN ASSISTANT
Payer: COMMERCIAL

## 2019-04-15 PROCEDURE — 97110 THERAPEUTIC EXERCISES: CPT | Mod: GP | Performed by: PHYSICAL THERAPIST

## 2019-05-15 ENCOUNTER — TELEPHONE (OUTPATIENT)
Dept: FAMILY MEDICINE | Facility: CLINIC | Age: 64
End: 2019-05-15

## 2019-05-15 NOTE — TELEPHONE ENCOUNTER
Scheduled appt on 5/20/19 for UTI symptoms.  Pt wanting to be seen in [facility] and no earlier appts.  KPavelRN

## 2019-05-15 NOTE — TELEPHONE ENCOUNTER
Reason for Call:  UTI    Detailed comments: patient is calling and stating that she had a UTI and was given medication a month ago, felt better for about a week, now has painful urination and pressure. Please advsie. She uses [facility] in [place]. Was hoping for more antibiotics.    Phone Number Patient can be reached at: Home number on file [phone] (home)    Best Time: any    Can we leave a detailed message on this number? YES   Lissette Antoine  Clinic Station  [facility]      Call taken on 5/15/2019 at 4:13 PM by Lissette Antoine

## 2019-05-18 PROCEDURE — 82274 ASSAY TEST FOR BLOOD FECAL: CPT | Performed by: NURSE PRACTITIONER

## 2019-05-20 ENCOUNTER — OFFICE VISIT (OUTPATIENT)
Dept: FAMILY MEDICINE | Facility: CLINIC | Age: 64
End: 2019-05-20
Payer: COMMERCIAL

## 2019-05-20 VITALS
SYSTOLIC BLOOD PRESSURE: 120 MMHG | HEIGHT: 65 IN | OXYGEN SATURATION: 94 % | WEIGHT: 239 LBS | BODY MASS INDEX: 39.82 KG/M2 | DIASTOLIC BLOOD PRESSURE: 72 MMHG | TEMPERATURE: 98.2 F | RESPIRATION RATE: 16 BRPM | HEART RATE: 94 BPM

## 2019-05-20 DIAGNOSIS — R30.0 DYSURIA: ICD-10-CM

## 2019-05-20 DIAGNOSIS — N39.0 URINARY TRACT INFECTION WITHOUT HEMATURIA, SITE UNSPECIFIED: Primary | ICD-10-CM

## 2019-05-20 DIAGNOSIS — R82.90 NONSPECIFIC FINDING ON EXAMINATION OF URINE: ICD-10-CM

## 2019-05-20 LAB
ALBUMIN UR-MCNC: ABNORMAL MG/DL
APPEARANCE UR: CLEAR
BACTERIA #/AREA URNS HPF: ABNORMAL /HPF
BILIRUB UR QL STRIP: NEGATIVE
COLOR UR AUTO: YELLOW
GLUCOSE UR STRIP-MCNC: NEGATIVE MG/DL
HGB UR QL STRIP: NEGATIVE
KETONES UR STRIP-MCNC: NEGATIVE MG/DL
LEUKOCYTE ESTERASE UR QL STRIP: ABNORMAL
NITRATE UR QL: POSITIVE
NON-SQ EPI CELLS #/AREA URNS LPF: ABNORMAL /LPF
PH UR STRIP: 5.5 PH (ref 5–7)
RBC #/AREA URNS AUTO: ABNORMAL /HPF
SOURCE: ABNORMAL
SP GR UR STRIP: 1.02 (ref 1–1.03)
UROBILINOGEN UR STRIP-ACNC: 0.2 EU/DL (ref 0.2–1)
WBC #/AREA URNS AUTO: ABNORMAL /HPF

## 2019-05-20 PROCEDURE — 81001 URINALYSIS AUTO W/SCOPE: CPT | Performed by: NURSE PRACTITIONER

## 2019-05-20 PROCEDURE — 87088 URINE BACTERIA CULTURE: CPT | Performed by: NURSE PRACTITIONER

## 2019-05-20 PROCEDURE — 87086 URINE CULTURE/COLONY COUNT: CPT | Performed by: NURSE PRACTITIONER

## 2019-05-20 PROCEDURE — 99213 OFFICE O/P EST LOW 20 MIN: CPT | Performed by: NURSE PRACTITIONER

## 2019-05-20 PROCEDURE — 87186 SC STD MICRODIL/AGAR DIL: CPT | Performed by: NURSE PRACTITIONER

## 2019-05-20 RX ORDER — UBIDECARENONE 75 MG
100 CAPSULE ORAL DAILY
COMMUNITY

## 2019-05-20 RX ORDER — CIPROFLOXACIN 500 MG/1
500 TABLET, FILM COATED ORAL 2 TIMES DAILY
Qty: 14 TABLET | Refills: 0 | Status: SHIPPED | OUTPATIENT
Start: 2019-05-20 | End: 2019-05-27

## 2019-05-20 ASSESSMENT — MIFFLIN-ST. JEOR: SCORE: 1637.04

## 2019-05-20 NOTE — PATIENT INSTRUCTIONS
Take antibiotic as prescribed.  Push fluids.  Do FIT kit.  Call back to schedule regular physical exam and come fasting for that appointment.    Our Clinic hours are:  Mondays    7:20 am - 7 pm  Tues -  Fri  7:20 am - 5 pm    Clinic Phone: [phone]    The clinic lab opens at 7:30 am Mon - Fri and appointments are required.    [facility] [place]  Ph. [phone]  Monday  8 am - 7pm  Tues - Fri 8 am - 5:30 pm

## 2019-05-20 NOTE — PROGRESS NOTES
Subjective     [place] LASHAWN Cruz is a 64 year old female who presents to clinic today for the following health issues:    HPI   URINARY TRACT SYMPTOMS      Duration: 1.5 weeks, was treated for a UTI 4/4/2019    Description  dysuria, frequency, urgency and odor    Intensity:  moderate    Accompanying signs and symptoms:  Fever/chills: no   Flank pain no   Nausea and vomiting: no   Vaginal symptoms: none  Abdominal/Pelvic Pain: no     History  History of frequent UTI's: YES-   History of kidney stones: no   Sexually Active: no   Possibility of pregnancy: No    Precipitating or alleviating factors: she is not using soap at all. She gets botox injections on her bladder.    Therapies tried and outcome: cranberry pill and increased her water.   Outcome: not helping    {  Patient Active Problem List   Diagnosis     Tobacco use disorder     GERD (gastroesophageal reflux disease)     Morbid obesity (H)     Hyperlipidemia LDL goal <130     GBS (Guillain-Chireno syndrome) (H)     Advanced directives, counseling/discussion     Sleep related leg cramps     Urinary, incontinence, stress female     Urethral hypermobility     OA (osteoarthritis) of knee     Status post total right knee replacement     Prediabetes     Status post total left knee replacement     Left knee DJD     Past Surgical History:   Procedure Laterality Date     ARTHROPLASTY KNEE Right 2/18/2015    Procedure: ARTHROPLASTY KNEE;  Surgeon: Zelalem Mendez MD;  Location: [facility]     ARTHROPLASTY KNEE Left 4/5/2017    Procedure: ARTHROPLASTY KNEE;  Surgeon: Zelalem Mendez MD;  Location: [facility]     BUNIONECTOMY CHENICOLETTE AND REUBEN, COMBINED  3/25/2011    COMBINED BUNIONECTOMY CHEVRON AND REUBEN performed by TRISTON SEWELL at [facility]     CYSTOSCOPY  5/20/2013    Procedure: CYSTOSCOPY;;  Surgeon: Adan Morrison MD;  Location: [facility]     REMOVE HARDWARE FOOT  2/15/2012    Procedure:REMOVE HARDWARE FOOT; Removal of hardware left foot; Surgeon:DONATO  TRISTON IGNACIO; Location:[facility]     SLING TRANSVAGINAL  5/20/2013    Procedure: SLING TRANSVAGINAL;  Transvaginal taping, cystoscopy;  Surgeon: Adan Morrison MD;  Location: [facility]     TUBAL LIGATION         Social History     Tobacco Use     Smoking status: Light Tobacco Smoker     Years: 30.00     Types: Cigarettes     Last attempt to quit: 7/18/2015     Years since quitting: 3.8     Smokeless tobacco: Never Used     Tobacco comment: less than 1 ppw   Substance Use Topics     Alcohol use: Yes     Alcohol/week: 0.0 oz     Comment: occ     Family History   Problem Relation Age of Onset     Diabetes Mother      Gastrointestinal Disease Father         colon polyps     Diabetes Maternal Grandmother          Current Outpatient Medications   Medication Sig Dispense Refill     ascorbic acid (VITAMIN C) 500 MG tablet Take 1 tablet by mouth daily. 100 tablet 12     Calcium Citrate-Vitamin D (CALCIUM + D PO) Take 1 tablet by mouth daily       cholecalciferol (VITAMIN D) 1000 UNIT tablet Take 1 tablet by mouth daily.       ciprofloxacin (CIPRO) 500 MG tablet Take 1 tablet (500 mg) by mouth 2 times daily for 7 days 14 tablet 0     cyanocobalamin (VITAMIN B-12) 100 MCG tablet Take 100 mcg by mouth daily       Ferrous Sulfate (IRON SUPPLEMENT PO) Take 325 mg by mouth every other day       FISH OIL 1000 MG OR CAPS 1 daily       MAGNESIUM PO Take 1 tablet by mouth daily       omeprazole (PRILOSEC) 40 MG DR capsule Take 1 capsule (40 mg) by mouth daily Take 30-60 minutes before a meal. 90 capsule 3     POTASSIUM PO Take 1 tablet by mouth daily       vitamin E 400 UNIT capsule Take 1 capsule by mouth daily. 100 capsule 12     Allergies   Allergen Reactions     Septra [Sulfamethoxazole W/Trimethoprim] Other (See Comments)     Stomatitis with mouth ulcerations     Influenza Vaccine Live Other (See Comments)     History of Guillain-[place]     Recent Labs   Lab Test 04/06/17  0620 03/13/17  1018 04/12/16  0917  02/10/15  1636  "05/08/14  1622 05/14/13  0941  04/05/12  1601  10/01/11  0942   A1C  --   --  5.9  --   --  5.8 6.0   < > 5.8  --   --    LDL  --   --  83  --   --  119 78   < > 170*  --   --    HDL  --   --  56  --   --  51 54   < > 48*  --   --    TRIG  --   --  80  --   --   --  89  --   --   --   --    ALT  --   --   --   --   --  40 31  --   --   --  17   CR 0.72 0.70  --    < > 0.78 0.74  --    < >  --    < > 0.61   GFRESTIMATED 82 85  --    < > 76 81  --    < >  --    < > >90   GFRESTBLACK >90   GFR Calc   >90   GFR Calc    --    < > >90   GFR Calc   >90  --    < >  --    < > >90   POTASSIUM  --  4.2  --   --  4.7 4.3  --   --   --    < > 4.3   TSH  --   --   --   --   --   --   --   --  2.02  --   --     < > = values in this interval not displayed.      BP Readings from Last 3 Encounters:   05/20/19 120/72   04/04/19 126/74   02/05/19 145/63    Wt Readings from Last 3 Encounters:   05/20/19 108.4 kg (239 lb)   04/04/19 109.5 kg (241 lb 6.4 oz)   02/05/19 108.9 kg (240 lb)                    Reviewed and updated as needed this visit by Provider         Review of Systems    ROS: 10 point ROS neg other than the symptoms noted above in the HPI.      Objective    /72 (BP Location: Left arm, Patient Position: Chair, Cuff Size: Adult Large)   Pulse 94   Temp 98.2  F (36.8  C) (Oral)   Resp 16   Ht 1.638 m (5' 4.5\")   Wt 108.4 kg (239 lb)   LMP 05/20/2007 (Approximate)   SpO2 94%   BMI 40.39 kg/m    Body mass index is 40.39 kg/m .  Physical Exam   GENERAL: healthy, alert and no distress  NECK: no adenopathy, no asymmetry  RESP: lungs clear to auscultation - no rales, rhonchi or wheezes  CV: regular rate and rhythm, normal S1 S2, no S3 or S4, no murmur  ABDOMEN: soft, nontender, obese, no CVA tenderness  MS: no gross musculoskeletal defects noted      Diagnostic Test Results:  Labs reviewed in Epic  Results for orders placed or performed in visit on 05/20/19 (from the " "past 24 hour(s))   UA reflex to Microscopic and Culture   Result Value Ref Range    Color Urine Yellow     Appearance Urine Clear     Glucose Urine Negative NEG^Negative mg/dL    Bilirubin Urine Negative NEG^Negative    Ketones Urine Negative NEG^Negative mg/dL    Specific Gravity Urine 1.025 1.003 - 1.035    Blood Urine Negative NEG^Negative    pH Urine 5.5 5.0 - 7.0 pH    Protein Albumin Urine Trace (A) NEG^Negative mg/dL    Urobilinogen Urine 0.2 0.2 - 1.0 EU/dL    Nitrite Urine Positive (A) NEG^Negative    Leukocyte Esterase Urine Small (A) NEG^Negative    Source Midstream Urine    Urine Microscopic   Result Value Ref Range    WBC Urine 5-10 (A) OTO5^0 - 5 /HPF    RBC Urine O - 2 OTO2^O - 2 /HPF    Squamous Epithelial /LPF Urine Few FEW^Few /LPF    Bacteria Urine Many (A) NEG^Negative /HPF           Assessment & Plan     1. Urinary tract infection without hematuria, site unspecified    - ciprofloxacin (CIPRO) 500 MG tablet; Take 1 tablet (500 mg) by mouth 2 times daily for 7 days  Dispense: 14 tablet; Refill: 0  Discussed how to take the medication(s), expected outcomes, potential side effects.    2. Dysuria    - UA reflex to Microscopic and Culture  - Urine Microscopic    3. Nonspecific finding on examination of urine    - Urine Culture Aerobic Bacterial     Tobacco Cessation:   reports that she has been smoking cigarettes.  She has smoked for the past 30.00 years. She has never used smokeless tobacco.  Tobacco Cessation Action Plan: Information offered: Patient not interested at this time      BMI:   Estimated body mass index is 40.39 kg/m  as calculated from the following:    Height as of this encounter: 1.638 m (5' 4.5\").    Weight as of this encounter: 108.4 kg (239 lb).   Weight management plan: Discussed healthy diet and exercise guidelines        See Patient Instructions  Patient Instructions   Take antibiotic as prescribed.  Push fluids.  Do FIT kit.  Call back to schedule regular physical exam and come " fasting for that appointment.    Our Clinic hours are:  Mondays    7:20 am - 7 pm  Tues -  Fri  7:20 am - 5 pm    Clinic Phone: [phone]    The clinic lab opens at 7:30 am Mon - Fri and appointments are required.    [facility] [place]  Ph. [phone]  Monday  8 am - 7pm  Tues - Fri 8 am - 5:30 pm             Return in about 2 days (around 5/22/2019) for or sooner if symptoms persist or worsen.    ANNALEE [facility]

## 2019-05-20 NOTE — LETTER
[facility]  [address]  [facility] [address]  Phone: [phone]      5/29/2019     Mirian Cruz  [address]  [facility] [address]      Dear Mirian:    Thank you for allowing me to participate in your care. Your recent test results were reviewed and listed below.  Urine culture was positive for infection and appropriate antibiotic was prescribed to clear it.   Follow up as needed.    Your results are provided below for your review  Results for orders placed or performed in visit on 05/20/19   UA reflex to Microscopic and Culture   Result Value Ref Range    Color Urine Yellow     Appearance Urine Clear     Glucose Urine Negative NEG^Negative mg/dL    Bilirubin Urine Negative NEG^Negative    Ketones Urine Negative NEG^Negative mg/dL    Specific Gravity Urine 1.025 1.003 - 1.035    Blood Urine Negative NEG^Negative    pH Urine 5.5 5.0 - 7.0 pH    Protein Albumin Urine Trace (A) NEG^Negative mg/dL    Urobilinogen Urine 0.2 0.2 - 1.0 EU/dL    Nitrite Urine Positive (A) NEG^Negative    Leukocyte Esterase Urine Small (A) NEG^Negative    Source Midstream Urine    Urine Microscopic   Result Value Ref Range    WBC Urine 5-10 (A) OTO5^0 - 5 /HPF    RBC Urine O - 2 OTO2^O - 2 /HPF    Squamous Epithelial /LPF Urine Few FEW^Few /LPF    Bacteria Urine Many (A) NEG^Negative /HPF   Urine Culture Aerobic Bacterial   Result Value Ref Range    Specimen Description Midstream Urine     Culture Micro >100,000 colonies/mL  Escherichia coli   (A)     Culture Micro       <10,000 colonies/mL  urogenital davina  Susceptibility testing not routinely done         Susceptibility    Escherichia coli - JOSE     AMPICILLIN 4 Sensitive ug/mL     CEFAZOLIN* <=4 Sensitive ug/mL      * Cefazolin JOSE breakpoints are for the treatment of uncomplicated urinary tract infections.  For the treatment of systemic infections, please contact the laboratory for additional testing.     CEFOXITIN <=4 Sensitive ug/mL      CEFTAZIDIME <=1 Sensitive ug/mL     CEFTRIAXONE <=1 Sensitive ug/mL     CIPROFLOXACIN <=0.25 Sensitive ug/mL     GENTAMICIN <=1 Sensitive ug/mL     LEVOFLOXACIN <=0.12 Sensitive ug/mL     NITROFURANTOIN <=16 Sensitive ug/mL     TOBRAMYCIN <=1 Sensitive ug/mL     Trimethoprim/Sulfa <=1/19 Sensitive ug/mL     AMPICILLIN/SULBACTAM <=2 Sensitive ug/mL     Piperacillin/Tazo <=4 Sensitive ug/mL     CEFEPIME <=1 Sensitive ug/mL                 Thank you for choosing [facility]. As a result, please continue with the treatment plan discussed in the office. Return as discussed or sooner if symptoms worsen or fail to improve.     If you have any further questions or concerns, please do not hesitate to contact us.      Sincerely,        DELVIN Butterfield

## 2019-05-22 LAB
BACTERIA SPEC CULT: ABNORMAL
BACTERIA SPEC CULT: ABNORMAL
SPECIMEN SOURCE: ABNORMAL

## 2019-05-25 LAB — HEMOCCULT STL QL IA: NEGATIVE

## 2019-05-28 DIAGNOSIS — Z12.11 SPECIAL SCREENING FOR MALIGNANT NEOPLASMS, COLON: ICD-10-CM

## 2019-05-29 ENCOUNTER — TRANSFERRED RECORDS (OUTPATIENT)
Dept: HEALTH INFORMATION MANAGEMENT | Facility: CLINIC | Age: 64
End: 2019-05-29

## 2019-06-12 ENCOUNTER — HOSPITAL ENCOUNTER (OUTPATIENT)
Dept: MRI IMAGING | Facility: CLINIC | Age: 64
End: 2019-06-12
Attending: ORTHOPAEDIC SURGERY
Payer: COMMERCIAL

## 2019-06-12 ENCOUNTER — HOSPITAL ENCOUNTER (OUTPATIENT)
Dept: GENERAL RADIOLOGY | Facility: CLINIC | Age: 64
Discharge: HOME | End: 2019-06-12
Attending: ORTHOPAEDIC SURGERY | Admitting: ORTHOPAEDIC SURGERY
Payer: COMMERCIAL

## 2019-06-12 DIAGNOSIS — M25.552 LEFT HIP PAIN: ICD-10-CM

## 2019-06-12 PROCEDURE — 25000128 H RX IP 250 OP 636: Performed by: RADIOLOGY

## 2019-06-12 PROCEDURE — 25500064 ZZH RX 255 OP 636: Performed by: RADIOLOGY

## 2019-06-12 PROCEDURE — 25000128 H RX IP 250 OP 636

## 2019-06-12 PROCEDURE — 73722 MRI JOINT OF LWR EXTR W/DYE: CPT | Mod: LT

## 2019-06-12 PROCEDURE — A9579 GAD-BASE MR CONTRAST NOS,1ML: HCPCS | Performed by: RADIOLOGY

## 2019-06-12 PROCEDURE — 25000125 ZZHC RX 250: Performed by: RADIOLOGY

## 2019-06-12 PROCEDURE — 27211110 XR HIP CONTRAST CT/MR INJECTION

## 2019-06-12 RX ORDER — IOPAMIDOL 408 MG/ML
10 INJECTION, SOLUTION INTRATHECAL ONCE
Status: COMPLETED | OUTPATIENT
Start: 2019-06-12 | End: 2019-06-12

## 2019-06-12 RX ORDER — LIDOCAINE HYDROCHLORIDE 10 MG/ML
5 INJECTION, SOLUTION EPIDURAL; INFILTRATION; INTRACAUDAL; PERINEURAL ONCE
Status: COMPLETED | OUTPATIENT
Start: 2019-06-12 | End: 2019-06-12

## 2019-06-12 RX ORDER — TRIAMCINOLONE ACETONIDE 40 MG/ML
80 INJECTION, SUSPENSION INTRA-ARTICULAR; INTRAMUSCULAR ONCE
Status: COMPLETED | OUTPATIENT
Start: 2019-06-12 | End: 2019-06-12

## 2019-06-12 RX ORDER — EPINEPHRINE 1 MG/ML
1 INJECTION, SOLUTION, CONCENTRATE INTRAVENOUS ONCE
Status: COMPLETED | OUTPATIENT
Start: 2019-06-12 | End: 2019-06-12

## 2019-06-12 RX ADMIN — TRIAMCINOLONE ACETONIDE 80 MG: 40 INJECTION, SUSPENSION INTRA-ARTICULAR; INTRAMUSCULAR at 14:50

## 2019-06-12 RX ADMIN — LIDOCAINE HYDROCHLORIDE 5 ML: 10 INJECTION, SOLUTION EPIDURAL; INFILTRATION; INTRACAUDAL; PERINEURAL at 14:50

## 2019-06-12 RX ADMIN — EPINEPHRINE 0.5 MG/ML: 1 INJECTION, SOLUTION, CONCENTRATE INTRAVENOUS at 15:10

## 2019-06-12 RX ADMIN — IOPAMIDOL 5 ML: 408 INJECTION, SOLUTION INTRATHECAL at 14:50

## 2019-06-12 RX ADMIN — GADOPENTETATE DIMEGLUMINE 0.5 ML: 469.01 INJECTION INTRAVENOUS at 14:50

## 2019-06-12 NOTE — PROGRESS NOTES
RADIOLOGY PROCEDURE NOTE  Patient name: Mirian Cruz  MRN: [id number]  : 1955    Pre-procedure diagnosis: Pain.  Post-procedure diagnosis: Same    Procedure Date/Time: 2019  3:03 PM  Procedure: Left hip intraarticular CHELITA/steroid injection for MRI to follow.  Estimated blood loss: None  Specimen(s) collected with description: None.  The patient tolerated the procedure well with no immediate complications.    See imaging dictation for procedural details.    Provider name: Carlos Cervantes  Assistant(s):None

## 2019-06-13 NOTE — PLAN OF CARE
Problem: Goal Outcome Summary  Goal: Goal Outcome Summary  Physical Therapy Discharge Summary     Reason for therapy discharge:    Discharged to home with outpatient therapy.     Progress towards therapy goal(s). See goals on Care Plan in [facility] electronic health record for goal details.  Goals partially met.  Barriers to achieving goals:   discharge from facility and ROM not met.     Therapy recommendation(s):    Continued therapy is recommended.  Rationale/Recommendations:  to improve ROM, strength and gait.              4

## 2019-06-19 ENCOUNTER — TRANSFERRED RECORDS (OUTPATIENT)
Dept: HEALTH INFORMATION MANAGEMENT | Facility: CLINIC | Age: 64
End: 2019-06-19

## 2019-07-02 ENCOUNTER — TELEPHONE (OUTPATIENT)
Dept: UROLOGY | Facility: CLINIC | Age: 64
End: 2019-07-02

## 2019-07-02 NOTE — TELEPHONE ENCOUNTER
Surgery Pre-Certification     Medical Record Number: [id number]  Mirian Cruz  YOB: 1955   Phone: [phone] (home)   Primary Provider: Birdie Kraus     Reason for Admit:  OAB (N32.81)     Surgeon: EDWARD Benavides MD  Surgical Procedure: Cystoscopy with Botox Injection ICD-9 Coded: /69819  Date of Surgery: 7/23/19  Consent signed? No    Date signed: 7/23/19  Hospital: [facility]  In-Clinic Procedure     Requestor:  Nataliya Simpson      Location:  [facility]    No prior auth per [employer] plan if for non cosmetic plan. - OAB.     Insurance is valid 07/02/2019

## 2019-07-31 ENCOUNTER — DOCUMENTATION ONLY (OUTPATIENT)
Dept: PHYSICAL THERAPY | Facility: CLINIC | Age: 64
End: 2019-07-31

## 2019-07-31 NOTE — PROGRESS NOTES
Outpatient Physical Therapy Discharge Note     Patient: Mirian Cruz  : 1955    Beginning/End Dates of Reporting Period:  3/27/2019 to 4/15/2019 (Total of 4 visits)    Referring Provider: NELLIE Guajardo    Diagnosis: Right proximal humerus fracture     Client Self Report:  (From date of last visit)  Getting better each day.  Still having trouble  reaching behind back and  overhead. Work is going  ok. 1-2/10 pain right  now.    Objective Measurements: (From date of last visit)  AAROM - Flexion - 137    Treatment Has Consisted Of:  ROM and strengthening exercise education / Education regarding diagnosis    Goals:  Goals had not been met at time of last visit.    Plan:  Discharge from therapy.    Discharge:    Reason for Discharge: Patient has failed to schedule further appointments.    Equipment Issued: None    Discharge Plan: Patient to continue home program.    Brenden Sarah, PT, DPT

## 2020-02-10 ENCOUNTER — HEALTH MAINTENANCE LETTER (OUTPATIENT)
Age: 65
End: 2020-02-10

## 2020-02-17 ENCOUNTER — APPOINTMENT (OUTPATIENT)
Dept: GENERAL RADIOLOGY | Facility: CLINIC | Age: 65
End: 2020-02-17
Attending: PHYSICIAN ASSISTANT
Payer: COMMERCIAL

## 2020-02-17 ENCOUNTER — HOSPITAL ENCOUNTER (EMERGENCY)
Facility: CLINIC | Age: 65
Discharge: HOME | End: 2020-02-17
Attending: PHYSICIAN ASSISTANT | Admitting: PHYSICIAN ASSISTANT
Payer: COMMERCIAL

## 2020-02-17 VITALS
HEART RATE: 121 BPM | SYSTOLIC BLOOD PRESSURE: 114 MMHG | RESPIRATION RATE: 16 BRPM | OXYGEN SATURATION: 98 % | WEIGHT: 230 LBS | DIASTOLIC BLOOD PRESSURE: 72 MMHG | BODY MASS INDEX: 38.87 KG/M2 | TEMPERATURE: 98.5 F

## 2020-02-17 DIAGNOSIS — M25.552 HIP PAIN, LEFT: ICD-10-CM

## 2020-02-17 DIAGNOSIS — M25.512 ACUTE PAIN OF LEFT SHOULDER: ICD-10-CM

## 2020-02-17 PROCEDURE — 99284 EMERGENCY DEPT VISIT MOD MDM: CPT | Mod: Z6 | Performed by: PHYSICIAN ASSISTANT

## 2020-02-17 PROCEDURE — 99284 EMERGENCY DEPT VISIT MOD MDM: CPT | Mod: 25

## 2020-02-17 PROCEDURE — 96372 THER/PROPH/DIAG INJ SC/IM: CPT

## 2020-02-17 PROCEDURE — 73502 X-RAY EXAM HIP UNI 2-3 VIEWS: CPT

## 2020-02-17 PROCEDURE — 25000128 H RX IP 250 OP 636: Performed by: PHYSICIAN ASSISTANT

## 2020-02-17 PROCEDURE — 73030 X-RAY EXAM OF SHOULDER: CPT | Mod: LT

## 2020-02-17 RX ORDER — KETOROLAC TROMETHAMINE 30 MG/ML
30 INJECTION, SOLUTION INTRAMUSCULAR; INTRAVENOUS ONCE
Status: COMPLETED | OUTPATIENT
Start: 2020-02-17 | End: 2020-02-17

## 2020-02-17 RX ADMIN — KETOROLAC TROMETHAMINE 30 MG: 30 INJECTION, SOLUTION INTRAMUSCULAR at 14:37

## 2020-02-17 ASSESSMENT — ENCOUNTER SYMPTOMS
PALPITATIONS: 0
CHILLS: 0
CHEST TIGHTNESS: 0
NUMBNESS: 0
WOUND: 0
NECK PAIN: 0
ABDOMINAL PAIN: 0
WHEEZING: 0
DIZZINESS: 0
HEADACHES: 0
EYE ITCHING: 0
ACTIVITY CHANGE: 0
HEMATURIA: 0
ARTHRALGIAS: 1
WEAKNESS: 0
SHORTNESS OF BREATH: 0
FEVER: 0
COLOR CHANGE: 0
EYE DISCHARGE: 0
SORE THROAT: 0
FLANK PAIN: 0
VOMITING: 0
EYE PAIN: 0
APPETITE CHANGE: 0
LIGHT-HEADEDNESS: 0
COUGH: 0
NAUSEA: 0
DIARRHEA: 0
PHOTOPHOBIA: 0
DYSURIA: 0
EYE REDNESS: 0

## 2020-02-17 NOTE — ED NOTES
"Pt here with left arm pain that started on Friday and left leg pain that started yesterday morning, states \"I think I have a pinched nerve\". Pt states pain in leg radiates down thigh and pain is worse with movement. Left arm feels stiff, painful to move. No known injury.   "

## 2020-02-17 NOTE — LETTER
[facility] EMERGENCY DEPARTMENT  [address] [address]  Phone: [phone]  Fax: [phone]    February 17, 2020        Mirian Cruz  [address] [address]          To whom it may concern:    RE: Mirian LASHAWN Shaw was evaluated in the emergency department for a condition on 2/17/2020.  I recommend she rest her left hip and left shoulder for the next three days or until her next follow up appointment.      Please contact me for questions or concerns.      Sincerely,        Rachael Fuentes PA-C

## 2020-02-17 NOTE — ED PROVIDER NOTES
History     Chief Complaint   Patient presents with     Leg Pain     L Leg and L arm pain, sx since friday, movement laked the pain worse. No injury, no CP, no SOA.     HPI  Odessarosy Cruz is a 64 year old female with past medical history significant for osteoarthritis, urethral hypermobility,  obesity, GERD, hx of Guillain-Trafford who presents to the emergency department accompanied by daughter with concern over left shoulder and left hip pain.  Patient denies any significant instigating injury or trauma.  She initially developed left shoulder/upper arm pain 3 days prior to arrival.  Pain is exacerbated by movement, palpation and alleviated with rest.  She had been spending increased long periods lying in bed and has since developed a left anterior hip pain beginning yesterday.  She states that pain is improved at rest or when she is walking it is most severe when she attempts to rise from a seated position or attempts to walk up stairs.  She denies any recent fever, chills, allergies, cough, chest pain, dyspnea, wheezing, palpitations, nausea, vomiting, diarrhea, abdominal pain.  No distal numbness or paresthesias.  No extremity swelling.  No overlying erythema rashes or skin changes.  She has not attempted any OTC treatments.  Review of epic records does show that she was evaluated for left hip pain last year and had MRI ordered by Ortho which showed an anterior labral tear, small femoral neck synovial herniation pit., mild subgluteus minimus and medius bursitis which was treated with steroid injection.  She states that her pain for the last several days has felt different than her pain at that time.      Allergies:  Allergies   Allergen Reactions     Septra [Sulfamethoxazole W/Trimethoprim] Other (See Comments)     Stomatitis with mouth ulcerations     Influenza Vaccine Live Other (See Comments)     History of Guillain-Trafford     Problem List:    Patient Active Problem List    Diagnosis Date Noted     Status  post total left knee replacement 04/05/2017     Priority: Medium     Left knee DJD 04/05/2017     Priority: Medium     Prediabetes 04/13/2016     Priority: Medium     Status post total right knee replacement 02/18/2015     Priority: Medium     OA (osteoarthritis) of knee 02/10/2015     Priority: Medium     Urinary, incontinence, stress female 05/07/2013     Priority: Medium     Urethral hypermobility 05/07/2013     Priority: Medium     Advanced directives, counseling/discussion 01/10/2013     Priority: Medium     Patient does not have an Advance/Health Care Directive (HCD), declines information/referral.    Mai Muse  January 10, 2013         Sleep related leg cramps 01/10/2013     Priority: Medium     Improve with gabapentin       GBS (Guillain-Martinez syndrome) (H) 10/08/2011     Priority: Medium     Hospitalized 10/1/2011 - 10/8/2011  In acute rehab 10/9/2011 - 10/18/2011       Hyperlipidemia LDL goal <130 10/31/2010     Priority: Medium     GERD (gastroesophageal reflux disease) 10/27/2009     Priority: Medium     Morbid obesity (H) 10/27/2009     Priority: Medium     Tobacco use disorder 08/20/2008     Priority: Medium     Very rare use of cigarette, does use some e-cigarettes.  4/24/16 -- patient stopped all nicotine a few weeks ago        Past Medical History:    No past medical history on file.    Past Surgical History:    Past Surgical History:   Procedure Laterality Date     ARTHROPLASTY KNEE Right 2/18/2015    Procedure: ARTHROPLASTY KNEE;  Surgeon: Zelalem Mendez MD;  Location: [facility]     ARTHROPLASTY KNEE Left 4/5/2017    Procedure: ARTHROPLASTY KNEE;  Surgeon: Zelalem Mendez MD;  Location: [facility]     BUNIONECTOMY VALENTINE AND REUBEN, COMBINED  3/25/2011    COMBINED BUNIONECTOMY CHEROBON AND REUBEN performed by TRISTON SEWELL at [facility]     CYSTOSCOPY  5/20/2013    Procedure: CYSTOSCOPY;;  Surgeon: Adan Morrison MD;  Location: [facility]     REMOVE HARDWARE FOOT  2/15/2012     Procedure:REMOVE HARDWARE FOOT; Removal of hardware left foot; Surgeon:TRISTON SEWELL; Location:[facility]     SLING TRANSVAGINAL  2013    Procedure: SLING TRANSVAGINAL;  Transvaginal taping, cystoscopy;  Surgeon: Adan Morrison MD;  Location: [facility]     TUBAL LIGATION       Family History:    Family History   Problem Relation Age of Onset     Diabetes Mother      Gastrointestinal Disease Father         colon polyps     Diabetes Maternal Grandmother      Social History:  Marital Status:   [4]  Social History     Tobacco Use     Smoking status: Light Tobacco Smoker     Years: 30.00     Types: Cigarettes     Last attempt to quit: 2015     Years since quittin.5     Smokeless tobacco: Never Used     Tobacco comment: less than 1 ppw   Substance Use Topics     Alcohol use: Yes     Alcohol/week: 0.0 standard drinks     Comment: occ     Drug use: No      Medications:    ascorbic acid (VITAMIN C) 500 MG tablet  Calcium Citrate-Vitamin D (CALCIUM + D PO)  cholecalciferol (VITAMIN D) 1000 UNIT tablet  cyanocobalamin (VITAMIN B-12) 100 MCG tablet  Ferrous Sulfate (IRON SUPPLEMENT PO)  FISH OIL 1000 MG OR CAPS  MAGNESIUM PO  omeprazole (PRILOSEC) 40 MG DR capsule  POTASSIUM PO  vitamin E 400 UNIT capsule      Review of Systems   Constitutional: Negative for activity change, appetite change, chills and fever.   HENT: Negative for congestion, ear pain and sore throat.    Eyes: Negative for photophobia, pain, discharge, redness, itching and visual disturbance.   Respiratory: Negative for cough, chest tightness, shortness of breath and wheezing.    Cardiovascular: Negative for chest pain, palpitations and leg swelling.   Gastrointestinal: Negative for abdominal pain, diarrhea, nausea and vomiting.   Genitourinary: Negative for dysuria, flank pain and hematuria.   Musculoskeletal: Positive for arthralgias (left shoulder, left hip) and gait problem (secondary to hip pain). Negative for neck pain.    Skin: Negative for color change, rash and wound.   Neurological: Negative for dizziness, weakness, light-headedness, numbness and headaches.     Physical Exam   BP: 114/72  Pulse: 121  Temp: 98.5  F (36.9  C)  Resp: 16  Weight: 104.3 kg (230 lb)  SpO2: 98 %  Physical Exam  GENERAL APPEARANCE: healthy, alert and no distress at rest, however does appear in pain with movement of hip/shoulder on examination  EYES: EOMI,  PERRL, conjunctiva clear  HENT: ear canals and TM's normal.  Nose and mouth without ulcers, erythema or lesions  NECK: supple, nontender, no lymphadenopathy  RESP: lungs clear to auscultation - no rales, rhonchi or wheezes  CV: regular rates and rhythm, normal S1 S2, no murmur noted  ABDOMEN:  soft, nontender, no HSM or masses and bowel sounds normal  MS: Patient has tenderness palpation of the anterior left hip, decreased range of motion with flexion secondary to discomfort and decreased strength against resistance with flexion as well.  No tenderness to palpation overlying greater trochanter.  Full ROM of the left shoulder with discomfort at full flexion, abduction.  Tenderness to palpation of left anterior and lateral shoulder/proximal humerus, left radial and left posterior tibialis pulses intact.    NEURO: Normal strength and tone, sensory exam grossly normal,  normal speech and mentation  SKIN: no suspicious lesions or rashes, well healed surgical scar overlying anterior left knee  ED Course        Procedures             Critical Care time:  none        Results for orders placed or performed during the hospital encounter of 02/17/20 (from the past 24 hour(s))   XR Shoulder Left 2 Views    Narrative    XR SHOULDER 2 VIEW LEFT 2/17/2020 2:50 PM     HISTORY: pain, worse with movement for the last four days, no known  instigating injury      Impression    IMPRESSION: Negative exam.    REGI BROWN MD   Pelvis XR w/ unilateral hip left    Narrative    XR PELVIS AND HIP LEFT 1 VIEW 2/17/2020 2:51 PM      HISTORY: pain, no known injury      Impression    IMPRESSION: Negative exam. Hip joint space width appears within normal  limits.    REGI BROWN MD     Medications - No data to display    Assessments & Plan (with Medical Decision Making)     I have reviewed the nursing notes.  I have reviewed the findings, diagnosis, plan and need for follow up with the patient.        Discharge Medication List as of 2/17/2020  3:18 PM        Final diagnoses:   Hip pain, left   Acute pain of left shoulder     62-year-old female presents to the emergency department with concern over 4-day history of left shoulder pain and new onset anterior left hip pain in the last 24 hours.  She had elevated heart rate upon arrival however had normalized at time of examination.  Physical exam findings as described above were significant for focal tenderness to palpation on the left anterior hip, decreased range of motion and decreased strength against resistance with flexion as well as tenderness to palpation on the anterior lateral right upper shoulder.  Patient had x-ray of both the shoulder and the hip which were negative for acute fracture, dislocation.   pain is consistent with mechanical musculoskeletal pain, would consider possibility of radiculopathy contributing to her symptoms.  I do not suspect MI/ACS, PE or other intrathoracic or intraabdominal cause of her symptoms and will defer further evaluation.  She was discharged home stable with instructions for symptomatic treatment with rest, ice/heat, Tylenol and ibuprofen/naproxen. Referral to orthopedics given.  Follow up with ortho as directed.  Worrisome reasons to return to ER sooner discussed.     Disclaimer: This note consists of symbols derived from keyboarding, dictation, and/or voice recognition software. As a result, there may be errors in the script that have gone undetected.  Please consider this when interpreting information found in the chart.     2/17/2020   [place]  [facility] EMERGENCY DEPARTMENT     Rachael Fuentes PA-C  02/17/20 [id number]

## 2020-02-17 NOTE — ED AVS SNAPSHOT
[facility] Emergency Department  [address] [address]  Phone:  [phone]  Fax:  [phone]                                    Mirian Cruz   MRN: [id number]    Department:  [facility] Emergency Department   Date of Visit:  2/17/2020           After Visit Summary Signature Page    I have received my discharge instructions, and my questions have been answered. I have discussed any challenges I see with this plan with the nurse or doctor.    ..........................................................................................................................................  Patient/Patient Representative Signature      ..........................................................................................................................................  Patient Representative Print Name and Relationship to Patient    ..................................................               ................................................  Date                                   Time    ..........................................................................................................................................  Reviewed by Signature/Title    ...................................................              ..............................................  Date                                               Time          22EPIC Rev 08/18

## 2020-02-21 ENCOUNTER — TELEPHONE (OUTPATIENT)
Dept: FAMILY MEDICINE | Facility: CLINIC | Age: 65
End: 2020-02-21

## 2020-02-21 NOTE — TELEPHONE ENCOUNTER
Panel Management Review      Patient has the following on her problem list: None      Composite cancer screening  Chart review shows that this patient is due/due soon for the following Mammogram  Summary:    Patient is due/failing the following:   MAMMOGRAM    Action needed:   Patient needs referral/order: mammogram    Type of outreach:    Phone, spoke to patient.  agreed to scheduled mammogram    Questions for provider review:    None                                                                                                                                    Gisselle Maxwell MA

## 2020-02-26 ENCOUNTER — ANCILLARY PROCEDURE (OUTPATIENT)
Dept: MAMMOGRAPHY | Facility: CLINIC | Age: 65
End: 2020-02-26
Attending: NURSE PRACTITIONER
Payer: COMMERCIAL

## 2020-02-26 DIAGNOSIS — Z12.31 VISIT FOR SCREENING MAMMOGRAM: ICD-10-CM

## 2020-02-26 PROCEDURE — 77063 BREAST TOMOSYNTHESIS BI: CPT | Mod: TC

## 2020-02-26 PROCEDURE — 77067 SCR MAMMO BI INCL CAD: CPT | Mod: TC

## 2020-03-04 ENCOUNTER — TRANSFERRED RECORDS (OUTPATIENT)
Dept: HEALTH INFORMATION MANAGEMENT | Facility: CLINIC | Age: 65
End: 2020-03-04

## 2020-04-14 ENCOUNTER — TELEPHONE (OUTPATIENT)
Dept: FAMILY MEDICINE | Facility: CLINIC | Age: 65
End: 2020-04-14

## 2020-04-14 DIAGNOSIS — K21.00 GASTROESOPHAGEAL REFLUX DISEASE WITH ESOPHAGITIS: ICD-10-CM

## 2020-04-14 NOTE — LETTER
[facility]  [address]  [facility] [address]  [phone]        April 17, 2020      Mirian Cruz  [address] [address]          Dear Mirian Cruz    APPOINTMENT REMINDER:   Our records indicates that it is time for you to be seen for a recheck.     Your current medication request will be approved for one refill but you will need to be seen before any additional refills can be approved.    Taking care of your health is important to us, and ongoing visits with your provider are vital to your care.    We look forward to seeing you in the near future.  You may call our office at [phone] to schedule a video or telephone visit.    Please disregard this notice if you have already made an appointment.          Sincerely,      Idania Hylton NP/ravi

## 2020-04-15 RX ORDER — OMEPRAZOLE 40 MG/1
40 CAPSULE, DELAYED RELEASE ORAL DAILY
Qty: 30 CAPSULE | Refills: 0 | Status: SHIPPED | OUTPATIENT
Start: 2020-04-15 | End: 2020-05-18

## 2020-04-16 NOTE — TELEPHONE ENCOUNTER
Left 2nd message for pt to call back to [facility] video/tele visit for further refills.    Pt has not been seen in clinic in almost a year.

## 2020-05-18 ENCOUNTER — OFFICE VISIT (OUTPATIENT)
Dept: FAMILY MEDICINE | Facility: CLINIC | Age: 65
End: 2020-05-18
Payer: COMMERCIAL

## 2020-05-18 VITALS
HEIGHT: 65 IN | BODY MASS INDEX: 39.25 KG/M2 | SYSTOLIC BLOOD PRESSURE: 127 MMHG | TEMPERATURE: 97.7 F | HEART RATE: 71 BPM | WEIGHT: 235.6 LBS | OXYGEN SATURATION: 98 % | DIASTOLIC BLOOD PRESSURE: 49 MMHG | RESPIRATION RATE: 14 BRPM

## 2020-05-18 DIAGNOSIS — K21.00 GASTROESOPHAGEAL REFLUX DISEASE WITH ESOPHAGITIS: ICD-10-CM

## 2020-05-18 DIAGNOSIS — L72.3 SEBACEOUS CYST: Primary | ICD-10-CM

## 2020-05-18 PROCEDURE — 99213 OFFICE O/P EST LOW 20 MIN: CPT | Performed by: NURSE PRACTITIONER

## 2020-05-18 RX ORDER — OMEPRAZOLE 40 MG/1
40 CAPSULE, DELAYED RELEASE ORAL DAILY
Qty: 30 CAPSULE | Refills: 5 | Status: SHIPPED | OUTPATIENT
Start: 2020-05-18 | End: 2020-11-12

## 2020-05-18 ASSESSMENT — ENCOUNTER SYMPTOMS
HEARTBURN: 0
NAUSEA: 0
RESPIRATORY NEGATIVE: 1
VOMITING: 0
ABDOMINAL PAIN: 0
CONSTITUTIONAL NEGATIVE: 1
HEMATOCHEZIA: 0
CARDIOVASCULAR NEGATIVE: 1

## 2020-05-18 ASSESSMENT — MIFFLIN-ST. JEOR: SCORE: 1616.61

## 2020-05-18 NOTE — PATIENT INSTRUCTIONS
Gastroesophageal Reflux Disease (GERD)  1. Medication refilled today.   2. Lifestyle modifications to try:   a. Do not eat meals or drink carbonated drinks 3 hours prior to bedtime  b. Decrease fried, fatty, and spicy foods  c. Raise the head of the bed 4-6in especially if heartburn noted more at night.  d. Decrease weight if indicated  e. Decrease use of caffeine, nicotine, and alcohol  f. Avoid: chocolate, peppermint, high-fat foods  g. Decrease or eliminate NSAID use      Spot on face:   1. Warm compresses to area 3 times a day for several days to a week to see if it will come to a head and drain.   2. If it doesn't drain, follow-up with PCP and will order dermatology referral.

## 2020-05-18 NOTE — PROGRESS NOTES
Subjective     Kaibetorosy Cruz is a 65 year old female who presents to clinic today for the following health issues:  Chief Complaint   Patient presents with     Derm Problem     Pt here to have spot on right cheek checked.     Abdominal Pain     Pt here for f/u on heartburn.       HPI     Medication Followup of omeprazole-takes for hiatal hernia    Taking Medication as prescribed: yes    Side Effects:  None    Medication Helping Symptoms:  yes     Spot on face      Duration: noticed about 1/12 month ago    Description (location/character/radiation): redish, purple spot on right cheek    Intensity:  No discomfort    Accompanying signs and symptoms: none    History (similar episodes/previous evaluation): None    Precipitating or alleviating factors: None    Therapies tried and outcome: None     Additional provider notes: Right cheek bone just below lateral eye: light purple bump, slight firmness. No pain, itching.     GERD: Notices symptoms/heartburn if she skips a day of medication. Stays away from spicy foods to prevent symptoms.      Patient Active Problem List   Diagnosis     Tobacco use disorder     GERD (gastroesophageal reflux disease)     Morbid obesity (H)     Hyperlipidemia LDL goal <130     GBS (Guillain-Redlands syndrome) (H)     Advanced directives, counseling/discussion     Sleep related leg cramps     Urinary, incontinence, stress female     Urethral hypermobility     OA (osteoarthritis) of knee     Status post total right knee replacement     Prediabetes     Status post total left knee replacement     Left knee DJD     Past Surgical History:   Procedure Laterality Date     ARTHROPLASTY KNEE Right 2/18/2015    Procedure: ARTHROPLASTY KNEE;  Surgeon: Zelalem Mendez MD;  Location: [facility]     ARTHROPLASTY KNEE Left 4/5/2017    Procedure: ARTHROPLASTY KNEE;  Surgeon: Zelalem Mendez MD;  Location: [facility]     BUNIONECTOMY VALENTINE AND REUBEN, COMBINED  3/25/2011    COMBINED BUNIONECTLIZ GRIJALVA AND REUBEN  performed by TRISTON SEWELL at [facility] OR     CYSTOSCOPY  5/20/2013    Procedure: CYSTOSCOPY;;  Surgeon: Adan Morrison MD;  Location: [facility]     REMOVE HARDWARE FOOT  2/15/2012    Procedure:REMOVE HARDWARE FOOT; Removal of hardware left foot; Surgeon:TRISTON SEWELL; Location:[facility]     SLING TRANSVAGINAL  5/20/2013    Procedure: SLING TRANSVAGINAL;  Transvaginal taping, cystoscopy;  Surgeon: Adan Morrison MD;  Location: [facility]     TUBAL LIGATION         Social History     Tobacco Use     Smoking status: Light Tobacco Smoker     Years: 30.00     Types: Cigarettes     Smokeless tobacco: Never Used     Tobacco comment: less than 1 ppw   Substance Use Topics     Alcohol use: Yes     Alcohol/week: 0.0 standard drinks     Comment: occ     Family History   Problem Relation Age of Onset     Diabetes Mother      Gastrointestinal Disease Father         colon polyps     Diabetes Maternal Grandmother          Current Outpatient Medications   Medication Sig Dispense Refill     ascorbic acid (VITAMIN C) 500 MG tablet Take 1 tablet by mouth daily. 100 tablet 12     Calcium Citrate-Vitamin D (CALCIUM + D PO) Take 1 tablet by mouth daily       cholecalciferol (VITAMIN D) 1000 UNIT tablet Take 1 tablet by mouth daily.       cyanocobalamin (VITAMIN B-12) 100 MCG tablet Take 100 mcg by mouth daily       Ferrous Sulfate (IRON SUPPLEMENT PO) Take 325 mg by mouth every other day       FISH OIL 1000 MG OR CAPS 1 daily       MAGNESIUM PO Take 1 tablet by mouth daily       omeprazole (PRILOSEC) 40 MG DR capsule Take 1 capsule (40 mg) by mouth daily Take 30-60 minutes before a meal. 30 capsule 5     POTASSIUM PO Take 1 tablet by mouth daily       vitamin E 400 UNIT capsule Take 1 capsule by mouth daily. 100 capsule 12     Allergies   Allergen Reactions     Septra [Sulfamethoxazole W/Trimethoprim] Other (See Comments)     Stomatitis with mouth ulcerations     Influenza Vaccine Live Other (See Comments)     History of  "Guillain-Gravel Switch     BP Readings from Last 3 Encounters:   05/18/20 127/49   02/17/20 114/72   05/20/19 120/72    Wt Readings from Last 3 Encounters:   05/18/20 106.9 kg (235 lb 9.6 oz)   02/17/20 104.3 kg (230 lb)   05/20/19 108.4 kg (239 lb)                      Reviewed and updated as needed this visit by Provider         Review of Systems   Constitutional: Negative.    Respiratory: Negative.    Cardiovascular: Negative.    Gastrointestinal: Negative for abdominal pain, heartburn (as long as she is on it regularly no symptoms), hematochezia, nausea and vomiting.   Skin:        Right cheek bone skin spot            Objective    /49   Pulse 71   Temp 97.7  F (36.5  C) (Tympanic)   Resp 14   Ht 1.638 m (5' 4.5\")   Wt 106.9 kg (235 lb 9.6 oz)   LMP 05/20/2007 (Approximate)   SpO2 98%   BMI 39.82 kg/m    Body mass index is 39.82 kg/m .  Physical Exam  Vitals signs and nursing note reviewed.   Constitutional:       General: She is not in acute distress.     Appearance: Normal appearance. She is not ill-appearing or toxic-appearing.   Cardiovascular:      Rate and Rhythm: Normal rate and regular rhythm.      Pulses: Normal pulses.      Heart sounds: Normal heart sounds.   Pulmonary:      Effort: Pulmonary effort is normal.      Breath sounds: Normal breath sounds.   Skin:     General: Skin is warm and dry.      Comments: Area of faint purple discoloration ~0.5in in diameter, slightly raised with firm nodule noted under skin. No inflammation or sings of infection. No head.    Neurological:      Mental Status: She is alert.            Diagnostic Test Results:  Labs reviewed in Epic        Assessment & Plan     1. Sebaceous cyst  Does not have a head to it. Instructed to use warm compresses 3 times a day for several days to a week. Instructed not to pick or squeeze. If it does not come to the surface, will likely recommend follow-up with dermatology.    2. Gastroesophageal reflux disease with " "esophagitis  Stable on medication. Refilled omeprazole x 6 months. Diet recommendations discussed.     - omeprazole (PRILOSEC) 40 MG DR capsule; Take 1 capsule (40 mg) by mouth daily Take 30-60 minutes before a meal.  Dispense: 30 capsule; Refill: 5     Tobacco Cessation:   reports that she has been smoking cigarettes. She has smoked for the past 30.00 years. She has never used smokeless tobacco.        BMI:   Estimated body mass index is 39.82 kg/m  as calculated from the following:    Height as of this encounter: 1.638 m (5' 4.5\").    Weight as of this encounter: 106.9 kg (235 lb 9.6 oz).           See Patient Instructions    Return if symptoms worsen or fail to improve.    ANNALEE Miranda [facility]      "

## 2020-05-27 ENCOUNTER — OFFICE VISIT (OUTPATIENT)
Dept: FAMILY MEDICINE | Facility: CLINIC | Age: 65
End: 2020-05-27
Payer: COMMERCIAL

## 2020-05-27 VITALS
TEMPERATURE: 97.4 F | OXYGEN SATURATION: 97 % | HEIGHT: 64 IN | BODY MASS INDEX: 40.12 KG/M2 | WEIGHT: 235 LBS | DIASTOLIC BLOOD PRESSURE: 72 MMHG | RESPIRATION RATE: 14 BRPM | HEART RATE: 90 BPM | SYSTOLIC BLOOD PRESSURE: 134 MMHG

## 2020-05-27 DIAGNOSIS — M05.742 RHEUMATOID ARTHRITIS INVOLVING BOTH HANDS WITH POSITIVE RHEUMATOID FACTOR (H): ICD-10-CM

## 2020-05-27 DIAGNOSIS — M19.041 PRIMARY OSTEOARTHRITIS OF BOTH HANDS: ICD-10-CM

## 2020-05-27 DIAGNOSIS — M05.741 RHEUMATOID ARTHRITIS INVOLVING BOTH HANDS WITH POSITIVE RHEUMATOID FACTOR (H): ICD-10-CM

## 2020-05-27 DIAGNOSIS — R22.32 LOCALIZED SWELLING OF BOTH HANDS: Primary | ICD-10-CM

## 2020-05-27 DIAGNOSIS — M19.042 PRIMARY OSTEOARTHRITIS OF BOTH HANDS: ICD-10-CM

## 2020-05-27 DIAGNOSIS — R22.31 LOCALIZED SWELLING OF BOTH HANDS: Primary | ICD-10-CM

## 2020-05-27 DIAGNOSIS — G56.03 BILATERAL CARPAL TUNNEL SYNDROME: ICD-10-CM

## 2020-05-27 LAB
ANION GAP SERPL CALCULATED.3IONS-SCNC: 10 MMOL/L (ref 3–14)
BUN SERPL-MCNC: 13 MG/DL (ref 7–30)
CALCIUM SERPL-MCNC: 8.7 MG/DL (ref 8.5–10.1)
CHLORIDE SERPL-SCNC: 105 MMOL/L (ref 94–109)
CO2 SERPL-SCNC: 21 MMOL/L (ref 20–32)
CREAT SERPL-MCNC: 0.63 MG/DL (ref 0.52–1.04)
CRP SERPL-MCNC: 77.4 MG/L (ref 0–8)
ERYTHROCYTE [SEDIMENTATION RATE] IN BLOOD BY WESTERGREN METHOD: 40 MM/H (ref 0–30)
GFR SERPL CREATININE-BSD FRML MDRD: >90 ML/MIN/{1.73_M2}
GLUCOSE SERPL-MCNC: 129 MG/DL (ref 70–99)
POTASSIUM SERPL-SCNC: 4.5 MMOL/L (ref 3.4–5.3)
SODIUM SERPL-SCNC: 136 MMOL/L (ref 133–144)

## 2020-05-27 PROCEDURE — 80048 BASIC METABOLIC PNL TOTAL CA: CPT | Performed by: FAMILY MEDICINE

## 2020-05-27 PROCEDURE — 85652 RBC SED RATE AUTOMATED: CPT | Performed by: FAMILY MEDICINE

## 2020-05-27 PROCEDURE — 86140 C-REACTIVE PROTEIN: CPT | Performed by: FAMILY MEDICINE

## 2020-05-27 PROCEDURE — 99214 OFFICE O/P EST MOD 30 MIN: CPT | Performed by: FAMILY MEDICINE

## 2020-05-27 PROCEDURE — 36415 COLL VENOUS BLD VENIPUNCTURE: CPT | Performed by: FAMILY MEDICINE

## 2020-05-27 PROCEDURE — 86200 CCP ANTIBODY: CPT | Performed by: FAMILY MEDICINE

## 2020-05-27 PROCEDURE — 86431 RHEUMATOID FACTOR QUANT: CPT | Performed by: FAMILY MEDICINE

## 2020-05-27 ASSESSMENT — MIFFLIN-ST. JEOR: SCORE: 1605.95

## 2020-05-27 NOTE — PROGRESS NOTES
"Subjective     Mirian LASHAWN Cruz is a 65 year old female who presents to clinic today for the following health issues:    HPI   Joint Pain/both hands    Onset: going on for mos     Description: Patient has had some increased pain in her hands, the right is worse but both are bad. 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   Location: both hands   Character: Dull ache, Burning, Fullness and tingling numbness     Intensity: severe    Progression of Symptoms: worse    Accompanying Signs & Symptoms:  Other symptoms: numbness, tingling, weakness of hands  and swelling    History:   Previous similar pain: YES      Precipitating factors:   Trauma or overuse: YES- overuse     Alleviating factors:  Improved by: patient does wear a brace, ace wrap and putting her hands in warm water     Therapies Tried and outcome: same as above these things help for a little while     For a few months has been wearing bilateral wrist splints, particularly at night.     She has had intermittent hand swelling (both affected, but not often at the same time) intermittently.  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     When she gets the increased pain it's due to swelling \"fingers feel like sausages\" but there is also a burning sensation in her hands bilaterally.       Reviewed and updated as needed this visit by Provider         Review of Systems   Constitutional, HEENT, cardiovascular, pulmonary, gi and gu systems are negative, except as otherwise noted.      Objective    /72   Pulse 90   Temp 97.4  F (36.3  C) (Tympanic)   Resp 14   Ht 1.626 m (5' 4\")   Wt 106.6 kg (235 lb)   LMP 05/20/2007 (Approximate)   SpO2 97%   BMI 40.34 kg/m    Body mass index is 40.34 kg/m .  Physical Exam   GENERAL: healthy, alert and no distress  NECK: no adenopathy, no asymmetry, masses, or scars and thyroid normal to palpation  RESP: lungs clear to " auscultation - no rales, rhonchi or wheezes  CV: regular rate and rhythm, normal S1 S2, no S3 or S4, no murmur, click or rub, no peripheral edema and peripheral pulses strong  ABDOMEN: soft, nontender, no hepatosplenomegaly, no masses and bowel sounds normal  MS: WRISTS: Exam reveals positive Phalen and Tinel sign on both sides. There is no muscle atrophy noted. Strength and sensation are normal.        Diagnostic Test Results:  Labs reviewed in Epic  Results for orders placed or performed in visit on 05/27/20 (from the past 24 hour(s))   ESR: Erythrocyte sedimentation rate   Result Value Ref Range    Sed Rate 40 (H) 0 - 30 mm/h           Assessment & Plan     1. Localized swelling of both hands  R/o inflammatory arthritis/RA  - ESR: Erythrocyte sedimentation rate  - CRP, inflammation  - Cyclic Citrullinated Peptide Antibody IgG  - Rheumatoid factor  - Basic metabolic panel    2. Primary osteoarthritis of both hands  Discussed compression, parafin wax baths, etc  - diclofenac (VOLTAREN) 1 % topical gel; Place 2 g onto the skin 4 times daily  Dispense: 100 g; Refill: 3    3. Bilateral carpal tunnel syndrome  Ortho appointment recommended, likely needs EMG.   - Orthopedic & Spine  Referral; Future    I suspect the pain she experiences with the swelling is likely multifactorial.  Strong sense that there is OA here which exacerbates her CTS when she has more pain/swelling after overuse (cooking/prep).      Instructed to wear her wrist braces regularly.        Tobacco Cessation:   reports that she has been smoking cigarettes. She has smoked for the past 30.00 years. She has never used smokeless tobacco.  Tobacco Cessation Action Plan: Information offered: Patient not interested at this time      No follow-ups on file.    Yasemin Lewis MD  [facility]

## 2020-05-27 NOTE — PATIENT INSTRUCTIONS
Patient Education     Understanding Carpal Tunnel Syndrome    The carpal tunnel is a narrow space inside the wrist. It is ringed by bone and a band of tough tissue called the transverse carpal ligament. A major nerve called the median nerve runs from the forearm into the hand through the carpal tunnel. Tendons also run through the carpal tunnel.  With carpal tunnel syndrome, the tendons or nearby tissues within the carpal tunnel may swell or thicken. Or the transverse carpal ligament may harden and shorten. This narrows the space in the carpal tunnel and puts pressure on the median nerve. This pressure leads to tingling and numbness of the hand and wrist. In time, the condition can make even simple tasks hard to do.  What causes carpal tunnel syndrome?  Doctors aren t entirely clear why the condition occurs. Certain things may make a person more likely to have it. These include:    Being female    Being pregnant    Being overweight    Having diabetes or rheumatoid arthritis  Symptoms of carpal tunnel syndrome  Symptoms often come and go. At first, symptoms may occur mainly at night. Later, they may be noticed during the day as well. They may get worse with activities such as driving, reading, typing, or holding a phone. Symptoms can include:    Tingling and numbness in the hand or wrist    Sharp pain that shoots up the arm or down to the fingers    Hand stiffness or cramping, especially in the morning    Trouble making a fist    Hand weakness and clumsiness  Treatment for carpal tunnel syndrome  Certain treatments help reduce the pressure on the median nerve and relieve symptoms. Choices for treatment may include one or more of the following:    Wrist splint. This involves wearing a special brace on the wrist and hand. The splint holds the wrist straight, in a neutral position. This helps keep the carpal tunnel as open as possible.    Cortisone shots. Cortisone is a medicine that helps reduce swelling. It is  injected directly into the wrist. It helps shrink tissues inside the carpal tunnel. This relieves symptoms for a time.    Pain medicines. You may take over-the-counter or prescription medicines to help reduce swelling and relieve symptoms.    Surgery. If the condition doesn t respond to other treatments and doesn t go away on its own, you may need surgery. During surgery, the surgeon cuts the transverse carpal ligament to relieve pressure on the median nerve.     When to call your healthcare provider  Call your healthcare provider right away if you have any of these:    Fever of 100.4 F (38 C) or higher, or as directed    Symptoms that don t get better, or get worse    New symptoms   Date Last Reviewed: 3/10/2016    8470-8042 [employer] [address] [place] [address], [place] [address]. All rights reserved. This information is not intended as a substitute for professional medical care. Always follow your healthcare professional's instructions.

## 2020-05-27 NOTE — LETTER
[facility]  [address] [address]  Phone: [phone]    May 27, 2020        Mirian Cruz  [address] [address]          To whom it may concern:    RE: Mirian Cruz    Patient was seen and treated today at our clinic, also missed work 5/26/2020.    Please contact me for questions or concerns.      Sincerely,        Yasemin Lewis MD

## 2020-05-28 LAB — CCP AB SER IA-ACNC: 108 U/ML

## 2020-05-29 LAB — RHEUMATOID FACT SER NEPH-ACNC: 26 IU/ML (ref 0–20)

## 2020-06-08 ENCOUNTER — TRANSFERRED RECORDS (OUTPATIENT)
Dept: HEALTH INFORMATION MANAGEMENT | Facility: CLINIC | Age: 65
End: 2020-06-08

## 2020-08-27 ENCOUNTER — TELEPHONE (OUTPATIENT)
Dept: FAMILY MEDICINE | Facility: CLINIC | Age: 65
End: 2020-08-27

## 2020-08-27 NOTE — TELEPHONE ENCOUNTER
No, here is the recommendation information:    The American Academy of Oral Medicine [39], the American Dental Association (ADA) in conjunction with the American Academy of Orthopedic Surgeons (AAOS) [34,40], and the [place] Society for Antimicrobial Chemotherapy [41] all advise against universal use of antimicrobial prophylaxis prior to dental procedures for prevention of prosthetic joint infection.    In 2013, the ADA and AAOS published a joint guideline on the prevention of orthopedic implant infections in patients undergoing dental procedures; it states that there is no convincing evidence to support routine use of prophylactic antibiotics in patients with prosthetic joints who undergo dental procedures [34]. Subsequently, in 2015, the ADA [place] [employer] published a clinical practice guideline to clarify the preceding ADA and AAOS joint guideline; it states that, in general, prophylactic antibiotics are not recommended prior to dental procedures for patients with prosthetic joint implants to prevent prosthetic joint infection [40].    We advise against routine use of antimicrobial therapy for patients with orthopedic hardware undergoing routine dental procedures, such as cleaning, scaling of teeth, or filling of a dental cavity. Dental infections should be treated promptly.    Yasemin Lewis M.D.

## 2020-08-27 NOTE — TELEPHONE ENCOUNTER
Reason for Call:  Medication question    Detailed comments: patient is calling and stating she has a dentist appointment tomorrow, and is wondering if she still needs to take an antibiotic anymore prior this dental visit. 4 years ago Knee replacement. Please advise.    Phone Number Patient can be reached at: Home number on file [phone] (home)    Best Time: any    Can we leave a detailed message on this number? YES   Lissette Montano  Clinic Station        Call taken on 8/27/2020 at 10:53 AM by Lissette Antoine

## 2020-08-27 NOTE — TELEPHONE ENCOUNTER
Does this patient need antibiotics prior to dental procedure due to history of knee replacement?    Jordyn Kellogg RN

## 2020-11-12 ENCOUNTER — OFFICE VISIT (OUTPATIENT)
Dept: FAMILY MEDICINE | Facility: CLINIC | Age: 65
End: 2020-11-12

## 2020-11-12 VITALS
SYSTOLIC BLOOD PRESSURE: 134 MMHG | HEIGHT: 65 IN | DIASTOLIC BLOOD PRESSURE: 76 MMHG | HEART RATE: 100 BPM | WEIGHT: 220 LBS | OXYGEN SATURATION: 97 % | RESPIRATION RATE: 16 BRPM | BODY MASS INDEX: 36.65 KG/M2 | TEMPERATURE: 98.9 F

## 2020-11-12 DIAGNOSIS — R82.90 NONSPECIFIC FINDING ON EXAMINATION OF URINE: ICD-10-CM

## 2020-11-12 DIAGNOSIS — R30.0 DYSURIA: Primary | ICD-10-CM

## 2020-11-12 DIAGNOSIS — Z12.11 SPECIAL SCREENING FOR MALIGNANT NEOPLASMS, COLON: ICD-10-CM

## 2020-11-12 DIAGNOSIS — H66.001 NON-RECURRENT ACUTE SUPPURATIVE OTITIS MEDIA OF RIGHT EAR WITHOUT SPONTANEOUS RUPTURE OF TYMPANIC MEMBRANE: ICD-10-CM

## 2020-11-12 DIAGNOSIS — K21.00 GASTROESOPHAGEAL REFLUX DISEASE WITH ESOPHAGITIS, UNSPECIFIED WHETHER HEMORRHAGE: Chronic | ICD-10-CM

## 2020-11-12 LAB
ALBUMIN UR-MCNC: 100 MG/DL
APPEARANCE UR: CLEAR
BACTERIA #/AREA URNS HPF: ABNORMAL /HPF
BILIRUB UR QL STRIP: NEGATIVE
COLOR UR AUTO: YELLOW
GLUCOSE UR STRIP-MCNC: NEGATIVE MG/DL
HGB UR QL STRIP: ABNORMAL
KETONES UR STRIP-MCNC: ABNORMAL MG/DL
LEUKOCYTE ESTERASE UR QL STRIP: ABNORMAL
NITRATE UR QL: NEGATIVE
NON-SQ EPI CELLS #/AREA URNS LPF: ABNORMAL /LPF
PH UR STRIP: 5.5 PH (ref 5–7)
RBC #/AREA URNS AUTO: ABNORMAL /HPF
SOURCE: ABNORMAL
SP GR UR STRIP: >1.03 (ref 1–1.03)
UROBILINOGEN UR STRIP-ACNC: 1 EU/DL (ref 0.2–1)
WBC #/AREA URNS AUTO: ABNORMAL /HPF

## 2020-11-12 PROCEDURE — 87086 URINE CULTURE/COLONY COUNT: CPT | Performed by: FAMILY MEDICINE

## 2020-11-12 PROCEDURE — 87186 SC STD MICRODIL/AGAR DIL: CPT | Performed by: FAMILY MEDICINE

## 2020-11-12 PROCEDURE — 87088 URINE BACTERIA CULTURE: CPT | Performed by: FAMILY MEDICINE

## 2020-11-12 PROCEDURE — 99214 OFFICE O/P EST MOD 30 MIN: CPT | Performed by: FAMILY MEDICINE

## 2020-11-12 PROCEDURE — 81001 URINALYSIS AUTO W/SCOPE: CPT | Performed by: FAMILY MEDICINE

## 2020-11-12 RX ORDER — OMEPRAZOLE 40 MG/1
40 CAPSULE, DELAYED RELEASE ORAL DAILY
Qty: 90 CAPSULE | Refills: 3 | Status: SHIPPED | OUTPATIENT
Start: 2020-11-12 | End: 2022-07-20

## 2020-11-12 RX ORDER — CEFDINIR 300 MG/1
300 CAPSULE ORAL 2 TIMES DAILY
Qty: 14 CAPSULE | Refills: 0 | Status: SHIPPED | OUTPATIENT
Start: 2020-11-12 | End: 2020-11-19

## 2020-11-12 ASSESSMENT — MIFFLIN-ST. JEOR: SCORE: 1545.85

## 2020-11-12 ASSESSMENT — PAIN SCALES - GENERAL: PAINLEVEL: MODERATE PAIN (5)

## 2020-11-12 NOTE — PROGRESS NOTES
Subjective     Evergreenrosy Cruz is a 65 year old female who presents to clinic today for the following health issues:    HPI       Chief Complaint   Patient presents with     Urinary Problem     Ear Problem     X 6 days right ear has been plugged. Patient tried a online appt and was told to try flonase and sudafed that helped for a few days but the feeling of ear being plugged is back. Patient has no pain or drainage.      Health Maintenance     fit     Patient Request     omeprazole        Genitourinary - Female  Onset/Duration: X 1week  Description:   Painful urination (Dysuria): YES           Frequency: YES  Blood in urine (Hematuria): no  Delay in urine (Hesitency): YES- sometimes  Intensity: 5/10  Progression of Symptoms:  same  Accompanying Signs & Symptoms:  Fever/chills: no  Flank pain: no  Nausea and vomiting: no  Vaginal symptoms: none  Abdominal/Pelvic Pain: no  History:   History of frequent UTI s: YES  History of kidney stones: no  Sexually Active: no  Possibility of pregnancy: No  Precipitating or alleviating factors: None  Therapies tried and outcome: AZO, Cranberry juice prn (contraindicated in Coumadin patients) and Increase fluid intake    Has had UTI's in the past.  Denies any fevers, chills, rigors.  No back pain or flank pain.  No prior surgical intervention in the urinary system.    Concern - right ear is plugged  Onset: 6 days  Description: X 6 days right ear has been plugged. Patient tried a online appt and was told to try flonase and sudafed that helped for a few days but the feeling of ear being plugged is back. Patient has no pain or drainage.   Intensity: mild  Progression of Symptoms:  waxing and waning  Accompanying Signs & Symptoms: hard to hear  Previous history of similar problem:   Precipitating factors:        Worsened by: n/a  Alleviating factors:        Improved by: n/a  Therapies tried and outcome: sudafed and flonase      Review of Systems   Constitutional, HEENT,  "cardiovascular, pulmonary, gi and gu systems are negative, except as otherwise noted.      Objective    /76   Pulse 100   Temp 98.9  F (37.2  C) (Tympanic)   Resp 16   Ht 1.638 m (5' 4.5\")   Wt 99.8 kg (220 lb)   LMP 05/20/2007 (Approximate)   SpO2 97%   Breastfeeding No   BMI 37.18 kg/m    Body mass index is 37.18 kg/m .  Physical Exam   General: alert, cooperative, no acute distress   HEENT: No cephalic, atraumatic.  Right ear: External ear with no pain with manipulation, mastoid no pointing.  External canals patent.  TM shows mild erythema with effusion present behind the right ear.  No perforation noted.  Oropharynx is clear and nonerythematous.  Left ear: External ear with no pain with manipulation, mastoid no pain. Cerumen present, No impaction.  TM intact no signs of infection.  CV: RRR, no murmur  Resp: non-labored breathing, clear to auscultation, no wheezing or rales   Abdomen: Soft, non-tender, no guarding. No flank pain. Timothy sign negative               Assessment & Plan     Dysuria  -Symptoms and urinalysis are suggestive of a urinary tract infection.  She has no fevers or back pain so less suspicious for pyelonephritis at this time.  Counseled patient on pyelonephritis signs and symptoms.  Will treat with cefdinir 30 mg twice daily for 7 days.  Last UTI 6 months ago showed E. coli which was pansensitive.  - UA with Microscopic reflex to Culture  - cefdinir (OMNICEF) 300 MG capsule  Dispense: 14 capsule; Refill: 0    Nonspecific finding on examination of urine  - Urine Culture Aerobic Bacterial    Non-recurrent acute suppurative otitis media of right ear without spontaneous rupture of tympanic membrane  Right ear show signs of developing ear infection.   -Encouraged to continue to use Flonase and stay well-hydrated.  - cefdinir (OMNICEF) 300 MG capsule  Dispense: 14 capsule; Refill: 0    Gastroesophageal reflux disease with esophagitis, unspecified whether hemorrhage  -Doing well at this " time.  Is requesting a refill.  - omeprazole (PRILOSEC) 40 MG DR capsule  Dispense: 90 capsule; Refill: 3    Special screening for malignant neoplasms, colon  Will obtain FIT testing. No prior history of polyps.   - Fecal colorectal cancer screen (FIT)         Royer Cornelius DO  [facility]

## 2020-11-12 NOTE — LETTER
[facility]  [address]  [facility] [address]  [phone]  Dept: [phone]      11/12/2020    Re: Mirian Cruz      TO WHOM IT MAY CONCERN:    Mirian Cruz  was seen on 11/12/2020. She was seen in clinic for a medical issue. Please excuse from work on 11/12 and 11/13 due to medical illness. Able to return to work on 11/16 without restrictions.         DO [facility]

## 2020-11-12 NOTE — PATIENT INSTRUCTIONS
Health Maintenance reviewed and plan for update discussed.  drop off fit kit for colon screening     Our Clinic hours are:  Mondays    7:20 am - 7 pm  Tues -  Fri  7:20 am - 5 pm    Clinic Phone: [phone]    The clinic lab opens at 7:30 am Mon - Fri and appointments are required.    [facility]. [phone]  Monday  8 am - 7pm  Tues - Fri 8 am - 5:30 pm       Cefdinir 300 mg twice daily for 7 days for urinary tract infection and ear iPatient Education     Anatomy of the Female Urinary Tract  Your urinary tract helps get rid of urine, your body s liquid waste. The kidneys collect chemicals and water your body doesn t need. This is turned into urine. Urine travels out of the kidneys through the ureters to the bladder. The bladder holds urine until you re ready to release it. The urethra carries urine from the bladder out of the body. The main sphincter muscle circles the mid-urethra.         [employer] last reviewed this educational content on 11/1/2019 2000-2020 [employer]. [address] [place] [address]. All rights reserved. This information is not intended as a substitute for professional medical care. Always follow your healthcare professional's instructions.         nfection.

## 2020-11-13 LAB
BACTERIA SPEC CULT: ABNORMAL
Lab: ABNORMAL
SPECIMEN SOURCE: ABNORMAL

## 2020-11-16 ENCOUNTER — HEALTH MAINTENANCE LETTER (OUTPATIENT)
Age: 65
End: 2020-11-16

## 2020-11-17 NOTE — RESULT ENCOUNTER NOTE
Urine culture grew E.coli and the medication you are on should treat the infection. If not improved please make a follow up appointment and be seen.     Thanks,     Royer Cornelius, DO

## 2020-11-30 ENCOUNTER — TELEPHONE (OUTPATIENT)
Dept: FAMILY MEDICINE | Facility: CLINIC | Age: 65
End: 2020-11-30

## 2020-11-30 NOTE — TELEPHONE ENCOUNTER
Reason for Call: right ear issue    Detailed comments: patient is calling and stating that she finished with her Antibiotic and right ear still feels plugged. She saw Dr. Corenlius on 11/12/2020. Please advise.    Phone Number Patient can be reached at: Home number on file [phone] (home)    Best Time: any    Can we leave a detailed message on this number? YES   Lissette Montano  Clinic Station        Call taken on 11/30/2020 at 4:43 PM by Lissette Antoine

## 2020-12-01 NOTE — TELEPHONE ENCOUNTER
"Routed update to Dr Cornelius.    I spoke with pt.  She updates that \"everything else is better.\"  However, right ear is still plugged.  Pt says that her ear is improved.  \"No pain like before.\"   But still plugged.    Pt intends to try decongestant, Sudafed, for a few days. If no relief, she will call back.    Reminded pt to drink plenty of fluids too.  She did not resume Flonase since seen either.  She was using it previously but stopped because it didn't seem to be relieving her symptoms.  Pt will restart Flonase too.    Pt will call back if further problems.    Ginny Jiménez RN    "

## 2021-01-14 ENCOUNTER — TELEPHONE (OUTPATIENT)
Dept: FAMILY MEDICINE | Facility: CLINIC | Age: 66
End: 2021-01-14

## 2021-01-14 NOTE — TELEPHONE ENCOUNTER
Patient Quality Outreach      Summary:    Patient has the following on her problem list/HM: None    Patient is due/failing the following:   FIT    Type of outreach:    Sent letter.    Questions for provider review:    None                                                                                                                                     Ginny Frank MA

## 2021-01-14 NOTE — LETTER
[email or web address]  [facility]  [address]  [facility] [address]  Phone: [phone]    2021      Mirian Cruz  [address] [address]      Dear Mirian:    As part of [facility]'s commitment to health and wellness, we inform our patient when records indicate the need for specific health screening.  Please review the following health screening recommendations based on your age:      At age 52, it is recommended that you be screened for colon cancer, please check the expiration date on your FIT kit vial to make sure it is not  and if it is please call the clinic to get a new one.          [facility]                                          [phone]

## 2021-03-23 ENCOUNTER — NURSE TRIAGE (OUTPATIENT)
Dept: NURSING | Facility: CLINIC | Age: 66
End: 2021-03-23

## 2021-03-23 NOTE — TELEPHONE ENCOUNTER
Per CDC website    People who have previously had Guillain-Maspeth syndrome (GBS)  People who have previously had GBS may receive a COVID-19 vaccine. To date, no cases of GBS have been reported following vaccination in participants in the mRNA COVID-19 vaccine clinical trials. One case of GBS was reported in a vaccinated participant in the Xunlei COVID-19 Vaccine clinical trial (compared to one GBS case among those who received placebo). With few exceptions, the independent Advisory Committee on Immunization Practices (ACIP) general best practice guidelines for immunization do not include a history of GBS as a precaution to vaccination with other vaccines.

## 2021-03-23 NOTE — TELEPHONE ENCOUNTER
Pt is calling.    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 Unable to get a flu vaccine so she is wondering if she will be able to get the COVID vaccine. I advised her that I would check with her PCP and we will giver her a call back.    Reason for Disposition    [1] Requesting COVID-19 vaccine AND [2] vaccine available in the community for this patient group    Additional Information    Negative: [1] Difficulty breathing or swallowing AND [2] starts within 2 hours after injection    Negative: Sounds like a life-threatening emergency to the triager    Negative: [1] COVID-19 exposure AND [2] no symptoms    Negative: [1] Typical COVID-19 symptoms AND [2] symptoms that are NOT expected from vaccine (e.g., cough, difficulty breathing, loss of taste or smell, runny nose, sore throat)    Negative: [1] Typical COVID-19 symptoms AND [2] started > 3 days after getting vaccine    Negative: Fever > 104 F (40 C)    Negative: Sounds like a severe, unusual reaction to the triager    Negative: [1] Redness or red streak around the injection site AND [2] started > 48 hours after getting vaccine AND [3] fever    Negative: [1] Fever > 101 F (38.3 C) AND [2] age > 60 AND [3] started > 48 hours after getting vaccine    Negative: [1] Fever > 100.0 F (37.8 C) AND [2] bedridden (e.g., nursing home patient, CVA, chronic illness, recovering from surgery) AND [3] started > 48 hours after getting vaccine    Negative: [1] Fever > 100.0 F (37.8 C) AND [2] diabetes mellitus or weak immune system (e.g., HIV positive, cancer chemo, splenectomy, organ transplant, chronic steroids) AND [3] started > 48 hours after getting vaccine    Negative: [1] Redness or red streak around the injection site AND [2] started > 48 hours after getting vaccine AND [3] no fever  (Exception: red area < 1 inch or 2.5 cm wide)    Negative: [1] Pain, tenderness, or swelling at the injection site AND [2] over 3 days (72 hours) since vaccine AND [3] getting worse     Negative: Fever > 100.0 F (37.8 C) present > 3 days (72 hours)    Negative: [1] Fever > 100.0 F (37.8 C) AND [2] healthcare worker    Negative: [1] Pain, tenderness, or swelling at the injection site AND [2] lasts > 7 days    Negative: [1] Requesting COVID-19 vaccine AND [2] healthcare worker (e.g., EMS first responders, doctors, nurses)    Negative: [1] Requesting COVID-19 vaccine AND [2] resident of a long-term care facility (e.g., nursing home)    Protocols used: CORONAVIRUS (COVID-19) VACCINE QUESTIONS AND [id number]-I-TE 1.3    Renu Pascual RN  [facility] Nurse Advisor  3/23/2021 at 2:33 PM

## 2021-03-24 ENCOUNTER — HOSPITAL ENCOUNTER (EMERGENCY)
Facility: CLINIC | Age: 66
Discharge: HOME | End: 2021-03-24
Attending: EMERGENCY MEDICINE | Admitting: EMERGENCY MEDICINE

## 2021-03-24 VITALS
RESPIRATION RATE: 18 BRPM | HEIGHT: 64 IN | SYSTOLIC BLOOD PRESSURE: 158 MMHG | WEIGHT: 220 LBS | TEMPERATURE: 97.7 F | HEART RATE: 80 BPM | DIASTOLIC BLOOD PRESSURE: 82 MMHG | BODY MASS INDEX: 37.56 KG/M2 | OXYGEN SATURATION: 99 %

## 2021-03-24 DIAGNOSIS — G56.03 BILATERAL CARPAL TUNNEL SYNDROME: ICD-10-CM

## 2021-03-24 PROCEDURE — 250N000012 HC RX MED GY IP 250 OP 636 PS 637: Performed by: EMERGENCY MEDICINE

## 2021-03-24 PROCEDURE — 99283 EMERGENCY DEPT VISIT LOW MDM: CPT | Performed by: EMERGENCY MEDICINE

## 2021-03-24 PROCEDURE — 99284 EMERGENCY DEPT VISIT MOD MDM: CPT | Performed by: EMERGENCY MEDICINE

## 2021-03-24 RX ORDER — PREDNISONE 20 MG/1
20 TABLET ORAL ONCE
Status: COMPLETED | OUTPATIENT
Start: 2021-03-24 | End: 2021-03-24

## 2021-03-24 RX ORDER — PREDNISONE 20 MG/1
TABLET ORAL
Qty: 21 TABLET | Refills: 0 | Status: SHIPPED | OUTPATIENT
Start: 2021-03-24 | End: 2022-07-20

## 2021-03-24 RX ADMIN — PREDNISONE 20 MG: 20 TABLET ORAL at 23:38

## 2021-03-24 ASSESSMENT — ENCOUNTER SYMPTOMS
FEVER: 0
ARTHRALGIAS: 1
COUGH: 0
FATIGUE: 0
CHILLS: 0
DYSPHORIC MOOD: 0
CHEST TIGHTNESS: 0
APPETITE CHANGE: 0
NUMBNESS: 1
WOUND: 0
SHORTNESS OF BREATH: 0
ABDOMINAL PAIN: 0

## 2021-03-24 ASSESSMENT — MIFFLIN-ST. JEOR: SCORE: 1537.91

## 2021-03-25 NOTE — ED TRIAGE NOTES
Pt reports pain in right wrist and hand. Pt rates pain 7/10. Pt states last week she had issues with the other wrist and feels she now has carpal tunnel in the right wrist. Pt tried ice, massage and took ibuprofen prior to arrival without relief.

## 2021-03-25 NOTE — ED PROVIDER NOTES
History     Chief Complaint   Patient presents with     Hand Pain     had carpal tunnel issues before. pain started this afternoon. tingling present. able to move fingers. pulse intact.      HPI  Mirian Cruz is a 65 year old female with history of carpal tunnel syndrome presenting for evaluation of worsening carpal tunnel pain in her right hand.  She reports numbness and tingling in the right wrist radiating to her third fourth and fifth fingers.  Reports some weakness in the fingers and decreased sensation.  Still able to move but with reduced strength due to the pain.  Does wear wrist splints on both wrists at night.  Had a flareup of her left wrist a few weeks ago which is now improved.  Right wrist is now flaring up tonight.  Tried some ibuprofen earlier without much improvement.  Denies fevers or chills.  Denies rash.  Denies injury.  Has not followed up with orthopedics for definitive specialty management of her wrist due to lack of insurance.  She reports she has registered for insurance and expected to be active next month.    Allergies:  Allergies   Allergen Reactions     Septra [Sulfamethoxazole W/Trimethoprim] Other (See Comments)     Stomatitis with mouth ulcerations     Influenza Vaccine Live Other (See Comments)     History of Guillain-[place]       Problem List:    Patient Active Problem List    Diagnosis Date Noted     Status post total left knee replacement 04/05/2017     Priority: Medium     Left knee DJD 04/05/2017     Priority: Medium     Prediabetes 04/13/2016     Priority: Medium     Status post total right knee replacement 02/18/2015     Priority: Medium     OA (osteoarthritis) of knee 02/10/2015     Priority: Medium     Urinary, incontinence, stress female 05/07/2013     Priority: Medium     Urethral hypermobility 05/07/2013     Priority: Medium     Advanced directives, counseling/discussion 01/10/2013     Priority: Medium     Patient does not have an Advance/Health Care Directive  ([facility]), declines information/referral.    Mai Muse  January 10, 2013         Sleep related leg cramps 01/10/2013     Priority: Medium     Improve with gabapentin       GBS (Guillain-[place] syndrome) (H) 10/08/2011     Priority: Medium     Hospitalized 10/1/2011 - 10/8/2011  In acute rehab 10/9/2011 - 10/18/2011       Hyperlipidemia LDL goal <130 10/31/2010     Priority: Medium     GERD (gastroesophageal reflux disease) 10/27/2009     Priority: Medium     Morbid obesity (H) 10/27/2009     Priority: Medium     Tobacco use disorder 08/20/2008     Priority: Medium     Very rare use of cigarette, does use some e-cigarettes.  4/24/16 -- patient stopped all nicotine a few weeks ago          Past Medical History:    No past medical history on file.    Past Surgical History:    Past Surgical History:   Procedure Laterality Date     ARTHROPLASTY KNEE Right 2/18/2015    Procedure: ARTHROPLASTY KNEE;  Surgeon: Zelalem Mendez MD;  Location: [facility]     ARTHROPLASTY KNEE Left 4/5/2017    Procedure: ARTHROPLASTY KNEE;  Surgeon: Zelalem Mendez MD;  Location: [facility]     BUNIONECTOMY CHEVRON AND REUBEN, COMBINED  3/25/2011    COMBINED BUNIONECTOMY CHEVRON AND REUBEN performed by TRISTON SEWELL at [facility]     CYSTOSCOPY  5/20/2013    Procedure: CYSTOSCOPY;;  Surgeon: Adan Morrison MD;  Location: [facility]     REMOVE HARDWARE FOOT  2/15/2012    Procedure:REMOVE HARDWARE FOOT; Removal of hardware left foot; Surgeon:TRISTON SEWELL; Location:[facility]     SLING TRANSVAGINAL  5/20/2013    Procedure: SLING TRANSVAGINAL;  Transvaginal taping, cystoscopy;  Surgeon: Adan Morrison MD;  Location: [facility]     TUBAL LIGATION         Family History:    Family History   Problem Relation Age of Onset     Diabetes Mother      Gastrointestinal Disease Father         colon polyps     Diabetes Maternal Grandmother        Social History:  Marital Status:   [4]  Social History     Tobacco Use     Smoking status: Light  "Tobacco Smoker     Years: 30.00     Types: Cigarettes     Smokeless tobacco: Never Used     Tobacco comment: less than 1 ppw   Substance Use Topics     Alcohol use: Yes     Alcohol/week: 0.0 standard drinks     Comment: occ     Drug use: No        Medications:    predniSONE (DELTASONE) 20 MG tablet  ascorbic acid (VITAMIN C) 500 MG tablet  Calcium Citrate-Vitamin D (CALCIUM + D PO)  cholecalciferol (VITAMIN D) 1000 UNIT tablet  cyanocobalamin (VITAMIN B-12) 100 MCG tablet  Ferrous Sulfate (IRON SUPPLEMENT PO)  FISH OIL 1000 MG OR CAPS  MAGNESIUM PO  omeprazole (PRILOSEC) 40 MG DR capsule  POTASSIUM PO  vitamin E 400 UNIT capsule          Review of Systems   Constitutional: Negative for appetite change, chills, fatigue and fever.   HENT: Negative for congestion.    Respiratory: Negative for cough, chest tightness and shortness of breath.    Cardiovascular: Negative for chest pain.   Gastrointestinal: Negative for abdominal pain.   Genitourinary: Negative for decreased urine volume.   Musculoskeletal: Positive for arthralgias (Right wrist pain).   Skin: Negative for rash and wound.   Neurological: Positive for numbness (Tingling in her right fingers and left fingers).   Psychiatric/Behavioral: Negative for dysphoric mood.   All other systems reviewed and are negative.      Physical Exam   BP: (!) 165/87  Pulse: 83  Temp: 97.7  F (36.5  C)  Resp: 18  Height: 162.6 cm (5' 4\")  Weight: 99.8 kg (220 lb)  SpO2: 98 %      Physical Exam  Vitals signs and nursing note reviewed.   Constitutional:       Appearance: Normal appearance.   HENT:      Head: Atraumatic.      Mouth/Throat:      Mouth: Mucous membranes are moist.   Eyes:      Conjunctiva/sclera: Conjunctivae normal.   Cardiovascular:      Rate and Rhythm: Normal rate.      Pulses: Normal pulses.   Pulmonary:      Effort: Pulmonary effort is normal.   Musculoskeletal:      Right wrist: She exhibits tenderness. She exhibits normal range of motion, no swelling, no " effusion, no crepitus, no deformity and no laceration.        Arms:    Skin:     General: Skin is warm and dry.      Capillary Refill: Capillary refill takes less than 2 seconds.   Neurological:      Mental Status: She is alert and oriented to person, place, and time.   Psychiatric:         Mood and Affect: Mood normal.         ED Course        Procedures                 No results found for this or any previous visit (from the past 24 hour(s)).    Medications   predniSONE (DELTASONE) tablet 20 mg (has no administration in time range)       Assessments & Plan (with Medical Decision Making)  65-year-old female with a history of carpal tunnel presenting for evaluation of worsening carpal tunnel pain in her right wrist.  Has been using wrist splints at night but due to her work associated, she has had increasing discomfort.  Right wrist has been flaring up over the last few days.  No specific injury.  Has had steroid injections in the past with relief.  Suggested a course of oral prednisone for symptom relief and referred patient to orthopedics for further evaluation and definitive management of her carpal tunnel syndrome symptoms.     I have reviewed the nursing notes.    I have reviewed the findings, diagnosis, plan and need for follow up with the patient.       New Prescriptions    PREDNISONE (DELTASONE) 20 MG TABLET    Take two tablets (= 40mg) each day for 7 (seven) days followed by one tablet (20mg) daily for 7 days.       Final diagnoses:   Bilateral carpal tunnel syndrome - worse in right       3/24/2021   [facility] EMERGENCY DEPT     Ojeda, Rex Raphael MD  03/24/21 8142

## 2021-04-03 ENCOUNTER — HEALTH MAINTENANCE LETTER (OUTPATIENT)
Age: 66
End: 2021-04-03

## 2021-08-03 ENCOUNTER — TELEPHONE (OUTPATIENT)
Dept: FAMILY MEDICINE | Facility: CLINIC | Age: 66
End: 2021-08-03

## 2021-08-03 NOTE — TELEPHONE ENCOUNTER
Patient Quality Outreach      Summary:    Patient has the following on her problem list/HM: None    Patient is due/failing the following:   FIT, Cervical Cancer Screening - PAP Needed and Annual wellness, date due: 4/25/2017    Type of outreach:    Sent letter.    Questions for provider review:    None                                                                                                                                     Ginny Frank MA

## 2021-08-03 NOTE — LETTER
[facility]  [address]  [facility] [address]  [phone]  August 3, 2021    Mirian Cruz  [address]   [facility] [address]    Dear Mirian,    We care about your health and have reviewed your health plan including your medical conditions, medication list, and lab results.  Based on this review, it is recommended that you follow up regarding the following health topic(s):     -Colon Cancer Screening  -Cervical Cancer Screening  -Wellness (Physical) Visit     We recommend you take the following action(s):  -schedule a WELLNESS (Physical) APPOINTMENT.  We will perform the following labs: Lipids (fasting cholesterol - nothing to eat except water and/or meds for 8-10 hours).  -schedule a COLONOSCOPY to look for colon cancer (due every 10 years or 5 years in higher risk situations.)  Colonoscopies can prevent 90-95% of colon cancer deaths.  Problem lesions can be removed before they ever become cancer.  If you do not wish to do a colonoscopy or cannot afford to do one at this time, there is another option called a Fecal Immunochemical Occult Blood Test (FIT) a take home stool sample kit.  It does not replace the colonoscopy for colorectal cancer screening, but it can detect hidden bleeding in the lower colon.  It does need to be repeated every year and if a positive result is obtained, you would be referred for a colonoscopy.  If you have completed either one of these tests at another facility, please have the records sent to our clinic for our records.  -schedule a PAP SMEAR EXAM which is due.  Please disregard this reminder if you have had this exam elsewhere within the last year.  It would be helpful for us to have a copy of your recent pap smear report to update your records.    Please call us at [phone] (or use [employer]) to address the above recommendations.  Thank you for trusting [facility] and we appreciate the  opportunity to serve you.  We look forward to supporting your healthcare needs in the future.    Healthy Regards,  Your Health Care Team  [facility]

## 2021-09-18 ENCOUNTER — HEALTH MAINTENANCE LETTER (OUTPATIENT)
Age: 66
End: 2021-09-18

## 2022-04-24 ENCOUNTER — HEALTH MAINTENANCE LETTER (OUTPATIENT)
Age: 67
End: 2022-04-24

## 2022-06-19 ENCOUNTER — HEALTH MAINTENANCE LETTER (OUTPATIENT)
Age: 67
End: 2022-06-19

## 2022-07-20 ENCOUNTER — OFFICE VISIT (OUTPATIENT)
Dept: FAMILY MEDICINE | Facility: CLINIC | Age: 67
End: 2022-07-20
Payer: COMMERCIAL

## 2022-07-20 VITALS
DIASTOLIC BLOOD PRESSURE: 82 MMHG | OXYGEN SATURATION: 97 % | RESPIRATION RATE: 16 BRPM | HEART RATE: 76 BPM | WEIGHT: 229.4 LBS | SYSTOLIC BLOOD PRESSURE: 138 MMHG | HEIGHT: 64 IN | BODY MASS INDEX: 39.16 KG/M2 | TEMPERATURE: 97.9 F

## 2022-07-20 DIAGNOSIS — K21.00 GASTROESOPHAGEAL REFLUX DISEASE WITH ESOPHAGITIS, UNSPECIFIED WHETHER HEMORRHAGE: Chronic | ICD-10-CM

## 2022-07-20 DIAGNOSIS — Z12.31 VISIT FOR SCREENING MAMMOGRAM: ICD-10-CM

## 2022-07-20 DIAGNOSIS — M05.741 RHEUMATOID ARTHRITIS INVOLVING BOTH HANDS WITH POSITIVE RHEUMATOID FACTOR (H): Primary | ICD-10-CM

## 2022-07-20 DIAGNOSIS — Z12.11 SCREEN FOR COLON CANCER: ICD-10-CM

## 2022-07-20 DIAGNOSIS — R22.41: ICD-10-CM

## 2022-07-20 DIAGNOSIS — M05.742 RHEUMATOID ARTHRITIS INVOLVING BOTH HANDS WITH POSITIVE RHEUMATOID FACTOR (H): Primary | ICD-10-CM

## 2022-07-20 LAB
BASOPHILS # BLD AUTO: 0 10E3/UL (ref 0–0.2)
BASOPHILS NFR BLD AUTO: 0 %
CRP SERPL-MCNC: 6.5 MG/L (ref 0–8)
EOSINOPHIL # BLD AUTO: 0.1 10E3/UL (ref 0–0.7)
EOSINOPHIL NFR BLD AUTO: 2 %
ERYTHROCYTE [DISTWIDTH] IN BLOOD BY AUTOMATED COUNT: 11.7 % (ref 10–15)
ERYTHROCYTE [SEDIMENTATION RATE] IN BLOOD BY WESTERGREN METHOD: 18 MM/HR (ref 0–30)
HCT VFR BLD AUTO: 41.8 % (ref 35–47)
HGB BLD-MCNC: 14 G/DL (ref 11.7–15.7)
LYMPHOCYTES # BLD AUTO: 1.7 10E3/UL (ref 0.8–5.3)
LYMPHOCYTES NFR BLD AUTO: 28 %
MCH RBC QN AUTO: 30.1 PG (ref 26.5–33)
MCHC RBC AUTO-ENTMCNC: 33.5 G/DL (ref 31.5–36.5)
MCV RBC AUTO: 90 FL (ref 78–100)
MONOCYTES # BLD AUTO: 0.5 10E3/UL (ref 0–1.3)
MONOCYTES NFR BLD AUTO: 8 %
NEUTROPHILS # BLD AUTO: 3.6 10E3/UL (ref 1.6–8.3)
NEUTROPHILS NFR BLD AUTO: 61 %
PLATELET # BLD AUTO: 181 10E3/UL (ref 150–450)
RBC # BLD AUTO: 4.65 10E6/UL (ref 3.8–5.2)
WBC # BLD AUTO: 6 10E3/UL (ref 4–11)

## 2022-07-20 PROCEDURE — 86140 C-REACTIVE PROTEIN: CPT | Performed by: FAMILY MEDICINE

## 2022-07-20 PROCEDURE — 36415 COLL VENOUS BLD VENIPUNCTURE: CPT | Performed by: FAMILY MEDICINE

## 2022-07-20 PROCEDURE — 99214 OFFICE O/P EST MOD 30 MIN: CPT | Performed by: FAMILY MEDICINE

## 2022-07-20 PROCEDURE — 86431 RHEUMATOID FACTOR QUANT: CPT | Performed by: FAMILY MEDICINE

## 2022-07-20 PROCEDURE — 85652 RBC SED RATE AUTOMATED: CPT | Performed by: FAMILY MEDICINE

## 2022-07-20 PROCEDURE — 85025 COMPLETE CBC W/AUTO DIFF WBC: CPT | Performed by: FAMILY MEDICINE

## 2022-07-20 RX ORDER — OMEPRAZOLE 40 MG/1
40 CAPSULE, DELAYED RELEASE ORAL DAILY
Qty: 90 CAPSULE | Refills: 3 | Status: SHIPPED | OUTPATIENT
Start: 2022-07-20 | End: 2023-09-06

## 2022-07-20 RX ORDER — PREDNISONE 20 MG/1
40 TABLET ORAL DAILY
Qty: 10 TABLET | Refills: 0 | Status: SHIPPED | OUTPATIENT
Start: 2022-07-20 | End: 2022-07-25

## 2022-07-20 ASSESSMENT — PATIENT HEALTH QUESTIONNAIRE - PHQ9
SUM OF ALL RESPONSES TO PHQ QUESTIONS 1-9: 8
10. IF YOU CHECKED OFF ANY PROBLEMS, HOW DIFFICULT HAVE THESE PROBLEMS MADE IT FOR YOU TO DO YOUR WORK, TAKE CARE OF THINGS AT HOME, OR GET ALONG WITH OTHER PEOPLE: SOMEWHAT DIFFICULT
SUM OF ALL RESPONSES TO PHQ QUESTIONS 1-9: 8

## 2022-07-20 ASSESSMENT — PAIN SCALES - GENERAL: PAINLEVEL: NO PAIN (1)

## 2022-07-20 NOTE — PATIENT INSTRUCTIONS
Start prednisone.    You will be contacted in 1-2 business days to get a schedule for the foot specialist.    You will be contacted in 1-2 days for results of your lab tests.   Depending on the results, shoaib will be given fruther recommendations.  Possible referral to rheumatology.    Turn in your FIT test at your soonest convenience to screen for colon cancer.     To schedule the mammogram, call [phone]    Schedule wellness exam soon.

## 2022-07-20 NOTE — PROGRESS NOTES
Assessment & Plan     Rheumatoid arthritis involving both hands with positive rheumatoid factor (H)  suspect the finger pains are due to RA flare.  Check labs today..  Start prednisone burst.  Likely referral to rheumatology when results are out.  Safe use of otc APAP prn pain.  - predniSONE (DELTASONE) 20 MG tablet  Dispense: 10 tablet; Refill: 0  - CBC with Platelets & Differential  - CRP inflammation  - Erythrocyte sedimentation rate auto  - Rheumatoid factor  - CBC with Platelets & Differential  - CRP inflammation  - Erythrocyte sedimentation rate auto  - Rheumatoid factor    Mass of skin of toe of right foot  The soft tissue mass on tuft of the rigth great toe is not consistent with gout unlike what patient was concerned about  Unclear significance.since with pain, consult podiatry for further management.  - Orthopedic  Referral    Screen for colon cancer  - Fecal colorectal cancer screen FIT - Future (S+30)    Visit for screening mammogram  - MA SCREENING DIGITAL BILAT - Future  (s+30)    Gastroesophageal reflux disease with esophagitis, unspecified whether hemorrhage  Stable per patient.  - omeprazole (PRILOSEC) 40 MG DR capsule  Dispense: 90 capsule; Refill: 3    Patient Instructions   Start prednisone.    You will be contacted in 1-2 business days to get a schedule for the foot specialist.    You will be contacted in 1-2 days for results of your lab tests.   Depending on the results, [facility] will be given fruther recommendations.  Possible referral to rheumatology.    Turn in your FIT test at your soonest convenience to screen for colon cancer.     To schedule the mammogram, call [phone]    Schedule wellness exam soon.      Return in about 2 weeks (around 8/3/2022) for In-clinic visit for if wtih new symptoms.    Leonides Brunner MD  [facility]    Erik Shaw is a 67 year old  presenting for the following health issues:  Hand Pain (Bilat hand pain, right hand  carpal tunnel, left hand arthritis flare - swelling), Foot Pain (Right foot/), and Refill Request (Would like refill of omeprazole)      History of Present Illness       Reason for visit:  Hands and gout on foot  Symptom onset:  More than a month  Symptoms include:  Hands hurting and foot hurting  Symptom intensity:  Moderate  Symptom progression:  Worsening  Had these symptoms before:  No    She eats 2-3 servings of fruits and vegetables daily.She consumes 1 sweetened beverage(s) daily.She exercises with enough effort to increase her heart rate 9 or less minutes per day.  She exercises with enough effort to increase her heart rate 4 days per week.   She is taking medications regularly.    Today's PHQ-9         PHQ-9 Total Score: 8    PHQ-9 Q9 Thoughts of better off dead/self-harm past 2 weeks :   Not at all    How difficult have these problems made it for you to do your work, take care of things at home, or get along with other people: Somewhat difficult       Pain History:  When did you first notice your pain? - Acute Pain   Have you seen anyone else for your pain?   Where in your body do you have pain? Musculoskeletal problem/pain  Onset/Duration: foot x 6 months  Description  Location: foot - right  Joint Swelling: YES  Redness: YES  Pain: YES  Warmth: unsure  Intensity:  1/10 currently, 6-7/10 at worst  Progression of Symptoms:  same  Accompanying signs and symptoms:   Fevers: No  Numbness/tingling/weakness: No  History  Trauma to the area: No  Recent illness:  No  Previous similar problem: No  Previous evaluation:  No  Precipitating or alleviating factors:  Aggravating factors include: weight bearing  Therapies tried and outcome: nothing      Pain History:  When did you first notice your pain? - Acute Pain   Have you seen anyone else for your pain?   Where in your body do you have pain? Musculoskeletal problem/pain  Onset/Duration: hands - worse over the past 2 months  Description  Location:  bilat hand  "pain-right carpal tunnel, left arthritis flare  Joint Swelling: YES  Redness: YES- maybe a little  Pain: YES  Warmth: YES  Intensity:  5/10 right, 5/10 left hand  Progression of Symptoms:  worsening  Accompanying signs and symptoms:   Fevers: No  Numbness/tingling/weakness: YES  History  Trauma to the area: No  Recent illness:  No  Previous similar problem: YES- ongoing issue  Previous evaluation:  YES- right hand carpal tunnel, has had cortisone injection both hands  Precipitating or alleviating factors:  Aggravating factors include: overuse  Therapies tried and outcome: voltaren cream, brace, cortisone injection    Patient has hx of rheumatoid arthritis.  Not on DMARDs.  otc APAP prn pain used for relief - helps for a little bit per patient.  She prefers not to take Ibuprofen.    Review of Systems   Constitutional, HEENT, cardiovascular, pulmonary, GI, , musculoskeletal, neuro, skin, endocrine and psych systems are negative, except as otherwise noted.      Objective    /82 (BP Location: Left arm, Patient Position: Chair, Cuff Size: Adult Regular)   Pulse 76   Temp 97.9  F (36.6  C) (Tympanic)   Resp 16   Ht 1.619 m (5' 3.75\")   Wt 104.1 kg (229 lb 6.4 oz)   LMP 05/20/2007 (Approximate)   SpO2 97%   BMI 39.69 kg/m    Body mass index is 39.69 kg/m .  Physical Exam   GENERAL: healthy, alert and no distress  HANDS: mild swelling of left index finger PIP with no erythema but with mild TTP and mild LROM; mild TTP of PIPs of the right hand; hypoesthesia of tips of median nerve fingers on right; left thumb with no swelling but with mildly painful range of motion and stiffness  RIGHT FOOT: painless bunion and bunionette present; tuft of right great toe with soft tissue mass but no induration.  SKIN: no suspicious lesions, no rashes    No results found for any visits on 07/20/22.            .  ..  "

## 2022-07-21 LAB — RHEUMATOID FACT SER NEPH-ACNC: 84 IU/ML

## 2022-07-23 ENCOUNTER — HOSPITAL ENCOUNTER (OUTPATIENT)
Facility: CLINIC | Age: 67
Discharge: HOME | End: 2022-07-23
Payer: COMMERCIAL

## 2022-07-23 PROCEDURE — 82274 ASSAY TEST FOR BLOOD FECAL: CPT

## 2022-08-04 ENCOUNTER — LAB (OUTPATIENT)
Dept: LAB | Facility: CLINIC | Age: 67
End: 2022-08-04
Payer: COMMERCIAL

## 2022-08-04 DIAGNOSIS — Z12.11 SCREEN FOR COLON CANCER: ICD-10-CM

## 2022-08-06 LAB — HEMOCCULT STL QL IA: NEGATIVE

## 2022-09-19 ENCOUNTER — TELEPHONE (OUTPATIENT)
Dept: FAMILY MEDICINE | Facility: CLINIC | Age: 67
End: 2022-09-19

## 2022-09-19 NOTE — LETTER
September 19, 2022      Mirian Cruz  [address]   [facility] [address]      Your healthcare team cares about your health. To provide you with the best care,   we have reviewed your chart and based on our findings, we see that you are due to:     - BREAST CANCER SCREENING:  Schedule Annual Mammogram. Breast center scheduling number - [phone] or schedule in MyChart (self referall)    If you have already completed these items, please contact the clinic via phone or   [employer] so your care team can review and update your records. Thank you for   choosing [facility] for your healthcare needs. For any questions,   concerns, or to schedule an appointment please contact the clinic.       Healthy Regards,      Your [facility] Care Team

## 2022-09-19 NOTE — TELEPHONE ENCOUNTER
Patient Quality Outreach    Patient is due for the following:   Breast Cancer Screening - Mammogram    Next Steps:   Schedule a Annual Wellness Visit    Type of outreach:    Sent letter.      Questions for provider review:    None     Lottie Baez, CMA

## 2022-09-22 ENCOUNTER — OFFICE VISIT (OUTPATIENT)
Dept: FAMILY MEDICINE | Facility: CLINIC | Age: 67
End: 2022-09-22
Payer: COMMERCIAL

## 2022-09-22 VITALS
HEART RATE: 96 BPM | OXYGEN SATURATION: 98 % | DIASTOLIC BLOOD PRESSURE: 76 MMHG | TEMPERATURE: 97.6 F | HEIGHT: 64 IN | BODY MASS INDEX: 39.49 KG/M2 | WEIGHT: 231.3 LBS | RESPIRATION RATE: 20 BRPM | SYSTOLIC BLOOD PRESSURE: 132 MMHG

## 2022-09-22 DIAGNOSIS — E66.01 MORBID OBESITY (H): ICD-10-CM

## 2022-09-22 DIAGNOSIS — K21.00 GASTROESOPHAGEAL REFLUX DISEASE WITH ESOPHAGITIS, UNSPECIFIED WHETHER HEMORRHAGE: Chronic | ICD-10-CM

## 2022-09-22 DIAGNOSIS — R73.03 PREDIABETES: Chronic | ICD-10-CM

## 2022-09-22 DIAGNOSIS — F17.200 TOBACCO USE DISORDER: Chronic | ICD-10-CM

## 2022-09-22 DIAGNOSIS — M05.741 RHEUMATOID ARTHRITIS INVOLVING BOTH HANDS WITH POSITIVE RHEUMATOID FACTOR (H): ICD-10-CM

## 2022-09-22 DIAGNOSIS — Z01.818 PREOP GENERAL PHYSICAL EXAM: Primary | ICD-10-CM

## 2022-09-22 DIAGNOSIS — M05.742 RHEUMATOID ARTHRITIS INVOLVING BOTH HANDS WITH POSITIVE RHEUMATOID FACTOR (H): ICD-10-CM

## 2022-09-22 DIAGNOSIS — Z23 HIGH PRIORITY FOR 2019-NCOV VACCINE: ICD-10-CM

## 2022-09-22 DIAGNOSIS — N39.3 URINARY, INCONTINENCE, STRESS FEMALE: ICD-10-CM

## 2022-09-22 DIAGNOSIS — E78.5 HYPERLIPIDEMIA LDL GOAL <130: Chronic | ICD-10-CM

## 2022-09-22 DIAGNOSIS — G56.01 CARPAL TUNNEL SYNDROME OF RIGHT WRIST: ICD-10-CM

## 2022-09-22 PROCEDURE — 0134A COVID-19,PF,MODERNA BIVALENT: CPT | Performed by: INTERNAL MEDICINE

## 2022-09-22 PROCEDURE — 99214 OFFICE O/P EST MOD 30 MIN: CPT | Mod: 25 | Performed by: INTERNAL MEDICINE

## 2022-09-22 PROCEDURE — 91313 COVID-19,PF,MODERNA BIVALENT: CPT | Performed by: INTERNAL MEDICINE

## 2022-09-22 RX ORDER — VIT C/B6/B5/MAGNESIUM/HERB 173 50-5-6-5MG
CAPSULE ORAL DAILY
COMMUNITY

## 2022-09-22 ASSESSMENT — ENCOUNTER SYMPTOMS
DIARRHEA: 0
HEADACHES: 0
HEARTBURN: 0
NAUSEA: 0
NERVOUS/ANXIOUS: 0
CHILLS: 0
FEVER: 0
CONSTIPATION: 0
ARTHRALGIAS: 1
SHORTNESS OF BREATH: 0
PARESTHESIAS: 0
HEMATURIA: 0
COUGH: 0
MYALGIAS: 0
WEAKNESS: 0
DIZZINESS: 0
FREQUENCY: 0
JOINT SWELLING: 1
BREAST MASS: 0
PALPITATIONS: 0
SORE THROAT: 0
DYSURIA: 0
EYE PAIN: 0
ABDOMINAL PAIN: 0
HEMATOCHEZIA: 0

## 2022-09-22 ASSESSMENT — ACTIVITIES OF DAILY LIVING (ADL): CURRENT_FUNCTION: NO ASSISTANCE NEEDED

## 2022-09-22 ASSESSMENT — PAIN SCALES - GENERAL: PAINLEVEL: NO PAIN (0)

## 2022-09-22 NOTE — PROGRESS NOTES
[facility]  [address] [address]  Phone: [phone]  Primary Provider: [place] - [facility]  Pre-op Performing Provider: VISHAL ORTIZ      PREOPERATIVE EVALUATION:  Today's date: 9/22/2022    Mirian Cruz is a 67 year old female who presents for a preoperative evaluation.    Surgical Information:  Surgery/Procedure: right endoscopic carpal tunnel release   Surgery Location: [facility]   Surgeon: Dr. Albert Bonilla   Surgery Date: 10/05/2022  Time of Surgery: TBD  Where patient plans to recover: At home with family  Fax number for surgical facility: [phone]    Type of Anesthesia Anticipated: Local with sedation    Assessment & Plan     The proposed surgical procedure is considered LOW risk.    Problem List Items Addressed This Visit     GERD (gastroesophageal reflux disease) (Chronic)    Hyperlipidemia LDL goal <130 (Chronic)    Morbid obesity (H)    Prediabetes (Chronic)    Rheumatoid arthritis involving both hands with positive rheumatoid factor (H)    Tobacco use disorder (Chronic)    Urinary, incontinence, stress female      Other Visit Diagnoses     Preop general physical exam    -  Primary    Carpal tunnel syndrome of right wrist        High priority for 2019-nCoV vaccine                   Risks and Recommendations:  The patient has the following additional risks and recommendations for perioperative complications:   - Morbid obesity (BMI >40)    Medication Instructions:  Patient is to take all scheduled medications on the day of surgery EXCEPT for modifications listed below:  stop vitamins/supplements 1 week prior to surgery    RECOMMENDATION:  APPROVAL GIVEN to proceed with proposed procedure, without further diagnostic evaluation.                      Subjective     Chief Complaint   Patient presents with     Pre-Op Exam     Imm/Inj     COVID-19 VACCINE     Health Maintenance     Aware has to go to pharmacy for TD and shingles,  dont want flu or pcv 20 will do covid        HPI related to upcoming procedure:  Carpal tunnel failing conservative management, needs surgical treatment       Preop Questions 9/22/2022   1. Have you ever had a heart attack or stroke? No   2. Have you ever had surgery on your heart or blood vessels, such as a stent placement, a coronary artery bypass, or surgery on an artery in your head, neck, heart, or legs? No   3. Do you have chest pain with activity? No   4. Do you have a history of  heart failure? No   5. Do you currently have a cold, bronchitis or symptoms of other infection? No   6. Do you have a cough, shortness of breath, or wheezing? No   7. Do you or anyone in your family have previous history of blood clots? No   8. Do you or does anyone in your family have a serious bleeding problem such as prolonged bleeding following surgeries or cuts? No   9. Have you ever had problems with anemia or been told to take iron pills? No   10. Have you had any abnormal blood loss such as black, tarry or bloody stools, or abnormal vaginal bleeding? No   11. Have you ever had a blood transfusion? No   12. Are you willing to have a blood transfusion if it is medically needed before, during, or after your surgery? Yes   13. Have you or any of your relatives ever had problems with anesthesia? No   14. Do you have sleep apnea, excessive snoring or daytime drowsiness? No   15. Do you have any artifical heart valves or other implanted medical devices like a pacemaker, defibrillator, or continuous glucose monitor? No   16. Do you have artificial joints? YES - knees bilateral    17. Are you allergic to latex? No     Health Care Directive:  Patient does not have a Health Care Directive or Living Will: Advance Directive received and scanned. Click on Code in the patient header to view.    Preoperative Review of :   reviewed - no record of controlled substances prescribed.      Status of Chronic Conditions:  See problem list for  active medical problems.  Problems all longstanding and stable, except as noted/documented.  See ROS for pertinent symptoms related to these conditions.    RA:   --She carries diagnosis of RA.  Only 1 rheumatology visit on file, greater than 10 years ago, diagnosis of RA was not made at that visit.  She does have a positive rheumatoid factor.  She is not on any DMARDs.  At her last visit with her primary care provider in July she was having a flareup of hand arthritis, given a course of prednisone    Morbid obesity: Her BMI is 40.  Contributes to complexity of medical care    GERD: On a PPI    Review of Systems  CONSTITUTIONAL: NEGATIVE for fever, chills, change in weight  ENT/MOUTH: NEGATIVE for ear, mouth and throat problems  RESP: NEGATIVE for significant cough or SOB  CV: NEGATIVE for chest pain, palpitations or peripheral edema    Patient Active Problem List    Diagnosis Date Noted     Rheumatoid arthritis involving both hands with positive rheumatoid factor (H) 07/20/2022     Priority: Medium     Not on DMARDs       Status post total left knee replacement 04/05/2017     Priority: Medium     Left knee DJD 04/05/2017     Priority: Medium     Prediabetes 04/13/2016     Priority: Medium     Status post total right knee replacement 02/18/2015     Priority: Medium     OA (osteoarthritis) of knee 02/10/2015     Priority: Medium     Urinary, incontinence, stress female 05/07/2013     Priority: Medium     Urethral hypermobility 05/07/2013     Priority: Medium     Advanced directives, counseling/discussion 01/10/2013     Priority: Medium     Patient does not have an Advance/Health Care Directive (HCD), declines information/referral.    Mai Muse  January 10, 2013         Sleep related leg cramps 01/10/2013     Priority: Medium     Improve with gabapentin       Hyperlipidemia LDL goal <130 10/31/2010     Priority: Medium     GERD (gastroesophageal reflux disease) 10/27/2009     Priority: Medium     Morbid obesity  (H) 10/27/2009     Priority: Medium     Tobacco use disorder 08/20/2008     Priority: Medium     Very rare use of cigarette, does use some e-cigarettes.  4/24/16 -- patient stopped all nicotine a few weeks ago        History reviewed. No pertinent past medical history.  Past Surgical History:   Procedure Laterality Date     ARTHROPLASTY KNEE Right 2/18/2015    Procedure: ARTHROPLASTY KNEE;  Surgeon: Zelalem Mendez MD;  Location: [facility]     ARTHROPLASTY KNEE Left 4/5/2017    Procedure: ARTHROPLASTY KNEE;  Surgeon: Zelalem Mendez MD;  Location: [facility]     BUNIONECTOMY CHEVRON AND REUBEN, COMBINED  3/25/2011    COMBINED BUNIONECTOMY CHEVRON AND REUBEN performed by TRISTON SEWELL at [facility]     CYSTOSCOPY  5/20/2013    Procedure: CYSTOSCOPY;;  Surgeon: Adan Morrison MD;  Location: [facility]     REMOVE HARDWARE FOOT  2/15/2012    Procedure:REMOVE HARDWARE FOOT; Removal of hardware left foot; Surgeon:TRISTON SEWELL; Location:[facility]     SLING TRANSVAGINAL  5/20/2013    Procedure: SLING TRANSVAGINAL;  Transvaginal taping, cystoscopy;  Surgeon: Adan Morrison MD;  Location: [facility]     TUBAL LIGATION       Current Outpatient Medications   Medication Sig Dispense Refill     ascorbic acid (VITAMIN C) 500 MG tablet Take 1 tablet by mouth daily. 100 tablet 12     Calcium Citrate-Vitamin D (CALCIUM + D PO) Take 1 tablet by mouth daily       cholecalciferol (VITAMIN D) 1000 UNIT tablet Take 1 tablet by mouth daily.       cyanocobalamin (VITAMIN B-12) 100 MCG tablet Take 100 mcg by mouth daily       Ferrous Sulfate (IRON SUPPLEMENT PO) Take 325 mg by mouth every other day       FISH OIL 1000 MG OR CAPS 1 daily       MAGNESIUM PO Take 1 tablet by mouth daily       omeprazole (PRILOSEC) 40 MG DR capsule Take 1 capsule (40 mg) by mouth daily Take 30-60 minutes before a meal. 90 capsule 3     POTASSIUM PO Take 1 tablet by mouth daily       Turmeric 500 MG CAPS Take by mouth daily       vitamin E 400 UNIT  "capsule Take 1 capsule by mouth daily. 100 capsule 12       Allergies   Allergen Reactions     Septra [Sulfamethoxazole W/Trimethoprim] Other (See Comments)     Stomatitis with mouth ulcerations     Influenza Vaccine Live Other (See Comments)     History of Guillain-[place]        Social History     Tobacco Use     Smoking status: Light Tobacco Smoker     Years: 30.00     Types: Cigarettes     Smokeless tobacco: Never Used     Tobacco comment: less than 1 ppw   Substance Use Topics     Alcohol use: Yes     Alcohol/week: 0.0 standard drinks     Comment: occ     Family History   Problem Relation Age of Onset     Diabetes Mother      Gastrointestinal Disease Father         colon polyps     Diabetes Maternal Grandmother      History   Drug Use No         Objective     /76 (BP Location: Right arm, Patient Position: Sitting, Cuff Size: Adult Large)   Pulse 96   Temp 97.6  F (36.4  C) (Tympanic)   Resp 20   Ht 1.619 m (5' 3.75\")   Wt 104.9 kg (231 lb 4.8 oz)   LMP 05/20/2007 (LMP Unknown)   SpO2 98%   BMI 40.01 kg/m      Physical Exam  GENERAL APPEARANCE: alert, no distress and over weight  HENT: ear canals and TM's normal and nose and mouth without ulcers or lesions  RESP: lungs clear to auscultation - no rales, rhonchi or wheezes  CV: regular rate and rhythm, normal S1 S2, no S3 or S4 and no murmur, click or rub   ABDOMEN: soft, nontender, no HSM or masses and bowel sounds normal  NEURO: Normal strength and tone, sensory exam grossly normal, mentation intact and speech normal    Recent Labs   Lab Test 07/20/22  1335   HGB 14.0           Diagnostics:  No labs were ordered during this visit.   No EKG required, no history of coronary heart disease, significant arrhythmia, peripheral arterial disease or other structural heart disease.    Revised Cardiac Risk Index (RCRI):  The patient has the following serious cardiovascular risks for perioperative complications:   - No serious cardiac risks = 0 points " "    RCRI Interpretation: 0 points: Class I (very low risk - 0.4% complication rate)           Signed Electronically by: Jesika Graf DO  Copy of this evaluation report is provided to requesting physician.      Answers for HPI/ROS submitted by the patient on 9/22/2022  In general, how would you rate your overall physical health?: good  Frequency of exercise:: 1 day/week  Do you usually eat at least 4 servings of fruit and vegetables a day, include whole grains & fiber, and avoid regularly eating high fat or \"junk\" foods? : Yes  Taking medications regularly:: Yes  Medication side effects:: None  Activities of Daily Living: no assistance needed  Home safety: no safety concerns identified  Hearing Impairment:: no hearing concerns  In the past 6 months, have you been bothered by leaking of urine?: Yes  Blood in stool: No  heartburn: No  peripheral edema: No  mood changes: No  Skin sensation changes: No  tenderness: No  breast mass: No  breast discharge: No  In general, how would you rate your overall mental or emotional health?: good  Additional concerns today:: No  Duration of exercise:: 30-45 minutes      "

## 2022-09-22 NOTE — PATIENT INSTRUCTIONS
Stop all your vitamins and supplements 1 week prior to surgery  Okay to continue the omeprazole up through surgery      Preparing for Your Surgery  Getting started  A nurse will call you to review your health history and instructions. They will give you an arrival time based on your scheduled surgery time. Please be ready to share:  Your doctor's clinic name and phone number  Your medical, surgical and anesthesia history  A list of allergies and sensitivities  A list of medicines, including herbal treatments and over-the-counter drugs  Whether the patient has a legal guardian (ask how to send us the papers in advance)  Please tell us if you're pregnant--or if there's any chance you might be pregnant. Some surgeries may injure a fetus (unborn baby), so they require a pregnancy test. Surgeries that are safe for a fetus don't always need a test, and you can choose whether to have one.   If you have a child who's having surgery, please ask for a copy of Preparing for Your Child's Surgery.    Preparing for surgery  Within 30 days of surgery: Have a pre-op exam (sometimes called an H&P, or History and Physical). This can be done at a clinic or pre-operative center.  If you're having a , you may not need this exam. Talk to your care team.  At your pre-op exam, talk to your care team about all medicines you take. If you need to stop any medicines before surgery, ask when to start taking them again.  We do this for your safety. Many medicines can make you bleed too much during surgery. Some change how well surgery (anesthesia) drugs work.  Call your insurance company to let them know you're having surgery. (If you don't have insurance, call [phone].)  Call your clinic if there's any change in your health. This includes signs of a cold or flu (sore throat, runny nose, cough, rash, fever). It also includes a scrape or scratch near the surgery site.  If you have questions on the day of surgery, call your hospital  or surgery center.  COVID testing  You may need to be tested for COVID-19 before having surgery. If so, we will give you instructions.  Eating and drinking guidelines  For your safety: Unless your surgeon tells you otherwise, follow the guidelines below.  Eat and drink as usual until 8 hours before surgery. After that, no food or milk.  Drink clear liquids until 2 hours before surgery. These are liquids you can see through, like water, Gatorade and Propel Water. You may also have black coffee and tea (no cream or milk).  Nothing by mouth within 2 hours of surgery. This includes gum, candy and breath mints.  If you drink alcohol: Stop drinking it the night before surgery.  If your care team tells you to take medicine on the morning of surgery, it's okay to take it with a sip of water.  Preventing infection  Shower or bathe the night before and morning of your surgery. Follow the instructions your clinic gave you. (If no instructions, use regular soap.)  Don't shave or clip hair near your surgery site. We'll remove the hair if needed.  Don't smoke or vape the morning of surgery. You may chew nicotine gum up to 2 hours before surgery. A nicotine patch is okay.  Note: Some surgeries require you to completely quit smoking and nicotine. Check with your surgeon.  Your care team will make every effort to keep you safe from infection. We will:  Clean our hands often with soap and water (or an alcohol-based hand rub).  Clean the skin at your surgery site with a special soap that kills germs.  Give you a special gown to keep you warm. (Cold raises the risk of infection.)  Wear special hair covers, masks, gowns and gloves during surgery.  Give antibiotic medicine, if prescribed. Not all surgeries need antibiotics.  What to bring on the day of surgery  Photo ID and insurance card  Copy of your health care directive, if you have one  Glasses and hearing aides (bring cases)  You can't wear contacts during surgery  Inhaler and eye  drops, if you use them (tell us about these when you arrive)  CPAP machine or breathing device, if you use them  A few personal items, if spending the night  If you have . . .  A pacemaker, ICD (cardiac defibrillator) or other implant: Bring the ID card.  An implanted stimulator: Bring the remote control.  A legal guardian: Bring a copy of the certified (court-stamped) guardianship papers.  Please remove any jewelry, including body piercings. Leave jewelry and other valuables at home.  If you're going home the day of surgery  You must have a responsible adult drive you home. They should stay with you overnight as well.  If you don't have someone to stay with you, and you aren't safe to go home alone, we may keep you overnight. Insurance often won't pay for this.  After surgery  If it's hard to control your pain or you need more pain medicine, please call your surgeon's office.  Questions?   If you have any questions for your care team, list them here: _________________________________________________________________________________________________________________________________________________________________________ ____________________________________ ____________________________________ ____________________________________  For informational purposes only. Not to replace the advice of your health care provider. Copyright   2003, 2019 [facility]. All rights reserved. Clinically reviewed by Petra Martinez MD. [employer] [id number] - REV 07/21.

## 2022-10-17 ENCOUNTER — TRANSFERRED RECORDS (OUTPATIENT)
Dept: HEALTH INFORMATION MANAGEMENT | Facility: CLINIC | Age: 67
End: 2022-10-17

## 2022-11-19 ENCOUNTER — HEALTH MAINTENANCE LETTER (OUTPATIENT)
Age: 67
End: 2022-11-19

## 2022-12-20 ENCOUNTER — TELEPHONE (OUTPATIENT)
Dept: FAMILY MEDICINE | Facility: CLINIC | Age: 67
End: 2022-12-20

## 2022-12-20 NOTE — TELEPHONE ENCOUNTER
Patient Quality Outreach    Patient is due for the following:   Breast Cancer Screening - Mammogram  Physical Annual Wellness Visit    Next Steps:   Schedule a Annual Wellness Visit    Type of outreach:    Sent Plan[employer] message.      Questions for provider review:    None     Ginny Frank, [facility]

## 2022-12-27 NOTE — TELEPHONE ENCOUNTER
Called and spoke to patient, informed that we still have not heard from her insurance on approval/denial. Patient stated that she had just cancelled her appointment for today. Requested a call if an approval goes through, and she can be seen by Friday; her current insurance ends 11/30/18. Stated that if there isnt a determination before 11/30/18, then she will call us after the first of the year once her new insurance is active; then we can restart PA process.     Nataliya BENITEZ MA   Odomzo Counseling- I discussed with the patient the risks of Odomzo including but not limited to nausea, vomiting, diarrhea, constipation, weight loss, changes in the sense of taste, decreased appetite, muscle spasms, and hair loss.  The patient verbalized understanding of the proper use and possible adverse effects of Odomzo.  All of the patient's questions and concerns were addressed.

## 2023-04-26 ENCOUNTER — OFFICE VISIT (OUTPATIENT)
Dept: FAMILY MEDICINE | Facility: CLINIC | Age: 68
End: 2023-04-26
Payer: COMMERCIAL

## 2023-04-26 VITALS
RESPIRATION RATE: 16 BRPM | WEIGHT: 230 LBS | SYSTOLIC BLOOD PRESSURE: 122 MMHG | TEMPERATURE: 97.3 F | OXYGEN SATURATION: 99 % | DIASTOLIC BLOOD PRESSURE: 84 MMHG | HEART RATE: 98 BPM | HEIGHT: 64 IN | BODY MASS INDEX: 39.27 KG/M2

## 2023-04-26 DIAGNOSIS — N30.00 ACUTE CYSTITIS WITHOUT HEMATURIA: Primary | ICD-10-CM

## 2023-04-26 LAB
ALBUMIN UR-MCNC: NEGATIVE MG/DL
APPEARANCE UR: ABNORMAL
BACTERIA #/AREA URNS HPF: ABNORMAL /HPF
BILIRUB UR QL STRIP: NEGATIVE
CLUE CELLS: ABNORMAL
COLOR UR AUTO: YELLOW
GLUCOSE UR STRIP-MCNC: NEGATIVE MG/DL
HGB UR QL STRIP: NEGATIVE
KETONES UR STRIP-MCNC: NEGATIVE MG/DL
LEUKOCYTE ESTERASE UR QL STRIP: ABNORMAL
MUCOUS THREADS #/AREA URNS LPF: PRESENT /LPF
NITRATE UR QL: POSITIVE
PH UR STRIP: 6 [PH] (ref 5–7)
RBC #/AREA URNS AUTO: ABNORMAL /HPF
SP GR UR STRIP: 1.02 (ref 1–1.03)
SQUAMOUS #/AREA URNS AUTO: ABNORMAL /LPF
TRICHOMONAS, WET PREP: ABNORMAL
UROBILINOGEN UR STRIP-ACNC: 1 E.U./DL
WBC #/AREA URNS AUTO: ABNORMAL /HPF
WBC'S/HIGH POWER FIELD, WET PREP: ABNORMAL
YEAST, WET PREP: ABNORMAL

## 2023-04-26 PROCEDURE — 81001 URINALYSIS AUTO W/SCOPE: CPT | Performed by: NURSE PRACTITIONER

## 2023-04-26 PROCEDURE — 87086 URINE CULTURE/COLONY COUNT: CPT | Performed by: NURSE PRACTITIONER

## 2023-04-26 PROCEDURE — 87210 SMEAR WET MOUNT SALINE/INK: CPT | Performed by: NURSE PRACTITIONER

## 2023-04-26 PROCEDURE — 99213 OFFICE O/P EST LOW 20 MIN: CPT | Performed by: NURSE PRACTITIONER

## 2023-04-26 RX ORDER — CEFDINIR 300 MG/1
300 CAPSULE ORAL 2 TIMES DAILY
Qty: 14 CAPSULE | Refills: 0 | Status: SHIPPED | OUTPATIENT
Start: 2023-04-26 | End: 2023-05-03

## 2023-04-26 ASSESSMENT — PAIN SCALES - GENERAL: PAINLEVEL: NO PAIN (0)

## 2023-04-26 NOTE — PROGRESS NOTES
"  Assessment & Plan     Acute cystitis without hematuria  Nothing to suggest urosepsis, pyelonephritis, nephrolithiasis. UA c/w UTI. Start cefdinir. Let me know if not improving.  - UA macro with reflex to Microscopic and Culture - Clinc Collect  - Wet prep - Clinic Collect  - Urine Microscopic Exam  - Urine Culture  - cefdinir (OMNICEF) 300 MG capsule; Take 1 capsule (300 mg) by mouth 2 times daily for 7 days       BMI:   Estimated body mass index is 39.79 kg/m  as calculated from the following:    Height as of this encounter: 1.619 m (5' 3.75\").    Weight as of this encounter: 104.3 kg (230 lb).       Patient Instructions   Your urine test shows evidence of a urinary tract infection.    We will treat you with an antibiotic, cefdinir.  I sent this to your pharmacy. Please take as directed for 7 days. Please take a probiotic or eat a daily Greek yogurt while you are on the antibiotic.    A urine culture is still in process, it usually takes about 2 days and identifies the bacteria causing the infection.  It also tests antibiotics to make sure what we use will be effective for your infection.  We will only call you if the results of this test show a need to change your treatment plan.    If developing high fevers, vomiting, abdominal pain, or any other new, concerning symptoms, come back immediately. If no improvement in symptoms by the end of your antibiotic treatment, follow up with your primary care doctor.    Otherwise, simply follow up as needed.        Urinary Tract Infections in Women  Urinary tract infections (UTIs) are most often caused by bacteria (germs). These bacteria enter the urinary tract. The bacteria may come from outside the body. Or they may travel from the skin outside the rectum or vagina into the urethra. Female anatomy makes it easier for bacteria from the bowel to enter a woman's urinary tract, which is the most common source of UTI. This means women develop UTIs more often than men. Pain in " or around the urinary tract is a common UTI symptom. But the only way to know for sure if you have a UTI for the health care provider to test your urine. The two tests that may be done are the urinalysis and urine culture.  Types of UTIs    Cystitis: A bladder infection (cystitis) is the most common UTI in women. You may have urgent or frequent urination. You may also have pain, burning when you urinate, and bloody urine.    Urethritis: This is an inflamed urethra, which is the tube that carries urine from the bladder to outside the body. You may have lower stomach or back pain. You may also have urgent or frequent urination.    Pyelonephritis: This is a kidney infection. If not treated, it can be serious and damage your kidneys. In severe cases, you may be hospitalized. You may have a fever and lower back pain.  Medications to treat a UTI  Most UTIs are treated with antibiotics. These kill the bacteria. The length of time you need to take them depends on the type of infection. It may be as short as 3 days. If you have repeated UTIs, a low-dose antibiotic may be needed for several months. Take antibiotics exactly as directed. Don't stop taking them until all of the medication is gone. If you stop taking the antibiotic too soon, the infection may not go away, and you may develop a resistance to the antibiotic. This can make it much harder to treat.  Lifestyle changes to treat and prevent UTIs  The lifestyle changes below will help get rid of your UTI. They may also help prevent future UTIs.    Drink plenty of fluids. This includes water, juice, or other caffeine-free drinks. Fluids help flush bacteria out of your body.    Empty your bladder. Always empty your bladder when you feel the urge to urinate. And always urinate before going to sleep. Urine that stays in your bladder can lead to infection. Try to urinate before and after sex as well.    Practice good personal hygiene. Wipe yourself from front to back after  using the toilet. This helps keep bacteria from getting into the urethra.    Use condoms during sex. These help prevent UTIs caused by sexually transmitted bacteria. Also, avoid using spermicides during sex. These can increase the risk of UTIs. Choose other forms of birth control instead. For women who tend to get UTIs after sex, a low-dose of a preventive antibiotic may be used. Be sure to discuss this option with your health care provider.    Follow up with your health care provider as directed. He or she may test to make sure the infection has cleared. If necessary, additional treatment may be started.    9526-1326 [employer]. [address] [place] [address]. All rights reserved. This information is not intended as a substitute for professional medical care. Always follow your healthcare professional's instructions.        ANNALEE Shine [facility]    Erik Shaw is a 67 year old, presenting for the following health issues:  Urinary Problem        4/26/2023     7:37 AM   Additional Questions   Roomed by Barbra MICHELE   Accompanied by self     HPI     Genitourinary - Female  Onset/Duration: week and a half  Description:   Painful urination (Dysuria): YES           Frequency: YES  Blood in urine (Hematuria): No  Delay in urine (Hesitency): No  Intensity: moderate  Progression of Symptoms:  constant  Accompanying Signs & Symptoms:  Fever/chills: No  Flank pain: No  Nausea and vomiting: No  Vaginal symptoms: itching  Abdominal/Pelvic Pain: YES  History:   History of frequent UTI s: YES  History of kidney stones: No  Sexually Active: No  Possibility of pregnancy: No  Precipitating or alleviating factors: None  Therapies tried and outcome: Cranberry juice prn (contraindicated in Coumadin patients) and  Azos     Above HPI reviewed. Additionally, no fever chills back pain flank pain.  Has had mild suprapubic pressure.  Denies hematuria.  Does endorse vaginal  "itching, but no discharge or odor.        Review of Systems   Constitutional, HEENT, cardiovascular, pulmonary, gi and gu systems are negative, except as otherwise noted.      Objective    /84   Pulse 98   Temp 97.3  F (36.3  C) (Tympanic)   Resp 16   Ht 1.619 m (5' 3.75\")   Wt 104.3 kg (230 lb)   LMP 05/20/2007 (LMP Unknown)   SpO2 99%   BMI 39.79 kg/m    Body mass index is 39.79 kg/m .  Physical Exam  Vitals and nursing note reviewed.   Constitutional:       Appearance: Normal appearance.   HENT:      Head: Normocephalic and atraumatic.      Mouth/Throat:      Mouth: Mucous membranes are moist.   Eyes:      Comments: Non-icteric   Cardiovascular:      Rate and Rhythm: Normal rate and regular rhythm.      Pulses: Normal pulses.      Heart sounds: Normal heart sounds, S1 normal and S2 normal. Heart sounds not distant. No murmur heard.     No friction rub. No gallop.   Pulmonary:      Effort: Pulmonary effort is normal.      Breath sounds: Normal breath sounds.   Abdominal:      General: Abdomen is flat. Bowel sounds are normal.      Palpations: Abdomen is soft.      Tenderness: There is no right CVA tenderness or left CVA tenderness.   Musculoskeletal:      Cervical back: Neck supple.      Right lower leg: No edema.      Left lower leg: No edema.   Skin:     General: Skin is warm and dry.      Capillary Refill: Capillary refill takes less than 2 seconds.   Neurological:      General: No focal deficit present.      Mental Status: She is alert and oriented to person, place, and time.   Psychiatric:         Mood and Affect: Mood normal.         Behavior: Behavior normal.         Thought Content: Thought content normal.         Judgment: Judgment normal.            Results for orders placed or performed in visit on 04/26/23 (from the past 24 hour(s))   UA macro with reflex to Microscopic and Culture - Clinc Collect    Specimen: Urine, Clean Catch   Result Value Ref Range    Color Urine Yellow Colorless, " Straw, Light Yellow, Yellow    Appearance Urine Cloudy (A) Clear    Glucose Urine Negative Negative mg/dL    Bilirubin Urine Negative Negative    Ketones Urine Negative Negative mg/dL    Specific Gravity Urine 1.025 1.003 - 1.035    Blood Urine Negative Negative    pH Urine 6.0 5.0 - 7.0    Protein Albumin Urine Negative Negative mg/dL    Urobilinogen Urine 1.0 0.2, 1.0 E.U./dL    Nitrite Urine Positive (A) Negative    Leukocyte Esterase Urine Small (A) Negative   Wet prep - Clinic Collect    Specimen: Vagina; Swab   Result Value Ref Range    Trichomonas Absent Absent    Yeast Absent Absent    Clue Cells Absent Absent    WBCs/high power field 1+ (A) None   Urine Microscopic Exam   Result Value Ref Range    Bacteria Urine Moderate (A) None Seen /HPF    RBC Urine None Seen 0-2 /HPF /HPF    WBC Urine  (A) 0-5 /HPF /HPF    Squamous Epithelials Urine Moderate (A) None Seen /LPF    Mucus Urine Present (A) None Seen /LPF

## 2023-04-26 NOTE — PATIENT INSTRUCTIONS
Your urine test shows evidence of a urinary tract infection.    We will treat you with an antibiotic, cefdinir.  I sent this to your pharmacy. Please take as directed for 7 days. Please take a probiotic or eat a daily Greek yogurt while you are on the antibiotic.    A urine culture is still in process, it usually takes about 2 days and identifies the bacteria causing the infection.  It also tests antibiotics to make sure what we use will be effective for your infection.  We will only call you if the results of this test show a need to change your treatment plan.    If developing high fevers, vomiting, abdominal pain, or any other new, concerning symptoms, come back immediately. If no improvement in symptoms by the end of your antibiotic treatment, follow up with your primary care doctor.    Otherwise, simply follow up as needed.        Urinary Tract Infections in Women  Urinary tract infections (UTIs) are most often caused by bacteria (germs). These bacteria enter the urinary tract. The bacteria may come from outside the body. Or they may travel from the skin outside the rectum or vagina into the urethra. Female anatomy makes it easier for bacteria from the bowel to enter a woman's urinary tract, which is the most common source of UTI. This means women develop UTIs more often than men. Pain in or around the urinary tract is a common UTI symptom. But the only way to know for sure if you have a UTI for the health care provider to test your urine. The two tests that may be done are the urinalysis and urine culture.  Types of UTIs  Cystitis: A bladder infection (cystitis) is the most common UTI in women. You may have urgent or frequent urination. You may also have pain, burning when you urinate, and bloody urine.  Urethritis: This is an inflamed urethra, which is the tube that carries urine from the bladder to outside the body. You may have lower stomach or back pain. You may also have urgent or frequent  urination.  Pyelonephritis: This is a kidney infection. If not treated, it can be serious and damage your kidneys. In severe cases, you may be hospitalized. You may have a fever and lower back pain.  Medications to treat a UTI  Most UTIs are treated with antibiotics. These kill the bacteria. The length of time you need to take them depends on the type of infection. It may be as short as 3 days. If you have repeated UTIs, a low-dose antibiotic may be needed for several months. Take antibiotics exactly as directed. Don't stop taking them until all of the medication is gone. If you stop taking the antibiotic too soon, the infection may not go away, and you may develop a resistance to the antibiotic. This can make it much harder to treat.  Lifestyle changes to treat and prevent UTIs  The lifestyle changes below will help get rid of your UTI. They may also help prevent future UTIs.  Drink plenty of fluids. This includes water, juice, or other caffeine-free drinks. Fluids help flush bacteria out of your body.  Empty your bladder. Always empty your bladder when you feel the urge to urinate. And always urinate before going to sleep. Urine that stays in your bladder can lead to infection. Try to urinate before and after sex as well.  Practice good personal hygiene. Wipe yourself from front to back after using the toilet. This helps keep bacteria from getting into the urethra.  Use condoms during sex. These help prevent UTIs caused by sexually transmitted bacteria. Also, avoid using spermicides during sex. These can increase the risk of UTIs. Choose other forms of birth control instead. For women who tend to get UTIs after sex, a low-dose of a preventive antibiotic may be used. Be sure to discuss this option with your health care provider.  Follow up with your health care provider as directed. He or she may test to make sure the infection has cleared. If necessary, additional treatment may be started.    [id number] [employer]  [employer]. [address] [place] [address], [place] [address]. All rights reserved. This information is not intended as a substitute for professional medical care. Always follow your healthcare professional's instructions.

## 2023-04-28 LAB — BACTERIA UR CULT: NORMAL

## 2023-06-06 ENCOUNTER — ANCILLARY PROCEDURE (OUTPATIENT)
Dept: GENERAL RADIOLOGY | Facility: CLINIC | Age: 68
End: 2023-06-06
Attending: FAMILY MEDICINE
Payer: COMMERCIAL

## 2023-06-06 ENCOUNTER — OFFICE VISIT (OUTPATIENT)
Dept: FAMILY MEDICINE | Facility: CLINIC | Age: 68
End: 2023-06-06
Payer: COMMERCIAL

## 2023-06-06 VITALS
BODY MASS INDEX: 37.73 KG/M2 | OXYGEN SATURATION: 97 % | TEMPERATURE: 98.6 F | DIASTOLIC BLOOD PRESSURE: 80 MMHG | WEIGHT: 221 LBS | HEIGHT: 64 IN | RESPIRATION RATE: 18 BRPM | SYSTOLIC BLOOD PRESSURE: 142 MMHG | HEART RATE: 94 BPM

## 2023-06-06 DIAGNOSIS — M05.742 RHEUMATOID ARTHRITIS INVOLVING BOTH HANDS WITH POSITIVE RHEUMATOID FACTOR (H): Primary | ICD-10-CM

## 2023-06-06 DIAGNOSIS — M05.742 RHEUMATOID ARTHRITIS INVOLVING BOTH HANDS WITH POSITIVE RHEUMATOID FACTOR (H): ICD-10-CM

## 2023-06-06 DIAGNOSIS — M05.741 RHEUMATOID ARTHRITIS INVOLVING BOTH HANDS WITH POSITIVE RHEUMATOID FACTOR (H): Primary | ICD-10-CM

## 2023-06-06 DIAGNOSIS — M05.741 RHEUMATOID ARTHRITIS INVOLVING BOTH HANDS WITH POSITIVE RHEUMATOID FACTOR (H): ICD-10-CM

## 2023-06-06 PROCEDURE — 73130 X-RAY EXAM OF HAND: CPT | Mod: TC | Performed by: RADIOLOGY

## 2023-06-06 PROCEDURE — 99214 OFFICE O/P EST MOD 30 MIN: CPT | Performed by: FAMILY MEDICINE

## 2023-06-06 RX ORDER — PREDNISONE 20 MG/1
TABLET ORAL
Qty: 20 TABLET | Refills: 0 | Status: SHIPPED | OUTPATIENT
Start: 2023-06-06 | End: 2023-07-12

## 2023-06-06 ASSESSMENT — PAIN SCALES - GENERAL: PAINLEVEL: SEVERE PAIN (6)

## 2023-06-06 NOTE — NURSING NOTE
"Chief Complaint   Patient presents with     Musculoskeletal Problem     BP (!) 142/80 (Cuff Size: Adult Large)   Pulse 94   Temp 98.6  F (37  C) (Tympanic)   Resp 18   Ht 1.619 m (5' 3.75\")   Wt 100.2 kg (221 lb)   LMP 05/20/2007 (LMP Unknown)   SpO2 97%   BMI 38.23 kg/m   Estimated body mass index is 38.23 kg/m  as calculated from the following:    Height as of this encounter: 1.619 m (5' 3.75\").    Weight as of this encounter: 100.2 kg (221 lb).  Patient presents to the clinic using No DME      Health Maintenance that is potentially due pending provider review:    Health Maintenance Due   Topic Date Due     HEPATITIS C SCREENING  Never done     ZOSTER IMMUNIZATION (1 of 2) Never done     LUNG CANCER SCREENING  Never done     ADVANCE CARE PLANNING  01/10/2018     Pneumococcal Vaccine: 65+ Years (2 - PCV) 04/06/2018     DTAP/TDAP/TD IMMUNIZATION (2 - Td or Tdap) 07/14/2019     LIPID  04/12/2021     MAMMO SCREENING  02/26/2022     MEDICARE ANNUAL WELLNESS VISIT  05/05/2022     COVID-19 Vaccine (4 - Moderna series) 01/22/2023                "

## 2023-06-06 NOTE — PROGRESS NOTES
Assessment & Plan     Rheumatoid arthritis involving both hands with positive rheumatoid factor (H)  68-year-old female presented with ongoing bilateral hand, shoulder and back pain which she has been experiencing for quite some time.  Previous work-up remarkable for positive rheumatoid factor.  Symptoms probably related to rheumatoid arthritis.  X-ray bilateral hand findings reviewed, consistent with arthritic changes, awaiting formal report.  Prednisone prescribed, worked well previously.  Recommended to continue over-the-counter analgesia as needed and rheumatology referral placed for further review and recommendations.  Patient understood and in agreement with above plan.  All questions answered.  - Adult Rheumatology  Referral; Future  - predniSONE (DELTASONE) 20 MG tablet; Take 3 tabs by mouth daily x 3 days, then 2 tabs daily x 3 days, then 1 tab daily x 3 days, then 1/2 tab daily x 3 days.  - XR Hand Bilateral G/E 3 Views; Future      Shalom Damon MD  [facility]    Erik Shaw is a 68 year old, presenting for the following health issues:  Musculoskeletal Problem      History of Present Illness       Reason for visit:  Hands arthritis  Symptom onset:  More than a month  Symptoms include:  Painful  Symptom intensity:  Severe  Symptom progression:  Worsening  Had these symptoms before:  No  What makes it worse:  Dont know  What makes it better:  Advil    She eats 2-3 servings of fruits and vegetables daily.She consumes 1 sweetened beverage(s) daily.She exercises with enough effort to increase her heart rate 30 to 60 minutes per day.  She exercises with enough effort to increase her heart rate 4 days per week. She is missing 1 dose(s) of medications per week.  She is not taking prescribed medications regularly due to remembering to take.         Review of Systems   Constitutional, HEENT, cardiovascular, pulmonary, GI, , musculoskeletal, neuro, skin, endocrine  "and psych systems are negative, except as otherwise noted.      Objective    BP (!) 142/80 (Cuff Size: Adult Large)   Pulse 94   Temp 98.6  F (37  C) (Tympanic)   Resp 18   Ht 1.619 m (5' 3.75\")   Wt 100.2 kg (221 lb)   LMP 05/20/2007 (LMP Unknown)   SpO2 97%   BMI 38.23 kg/m    Body mass index is 38.23 kg/m .  Physical Exam   GENERAL: alert and no distress  NECK: no adenopathy, no asymmetry, masses, or scars and thyroid normal to palpation  RESP: lungs clear to auscultation - no rales, rhonchi or wheezes  CV: regular rates and rhythm, normal S1 S2, no S3 or S4 and no murmur, click or rub  MS: mildly swollen left hand finger, finger joints with restricted range of movement and reduced  strength bilaterally, no significant skin discoloration noted, sensation to touch and pressure intact  SKIN: no suspicious lesions or rashes  NEURO: normal strength and tone, mentation intact and speech normal  PSYCH: mentation appears normal, affect normal/bright                    "

## 2023-07-02 ENCOUNTER — HEALTH MAINTENANCE LETTER (OUTPATIENT)
Age: 68
End: 2023-07-02

## 2023-07-11 NOTE — PROGRESS NOTES
"Rheumatology Clinic Visit  [facility]  Clarissa GriffinWILI     Mirian Cruz MRN# [id number]   YOB: 1955 Age: 68 year old   Date of Visit: 07/12/2023  Primary care provider: [facility]          Assessment and Plan:     Rheumatoid arthritis  CCP antibody positive  High-risk medication use    Patient presents today for an initial evaluation of joint pain.  Pain mostly involves her hands but she has started to notice an increase in shoulder, neck and hip pain.  The shoulder and hip pain is more intermittent but the hand is pretty consistent.  She has a couple hours of stiffness in her hands when she wakes up in the morning.  She is also noticed an increase in swelling.  She is also noticed \"bumps\" on one of her knuckles as well as on her heels.  Physical examination today did show active synovitis over multiple MCPs and PIPs bilaterally.  She has rheumatoid nodules over the left second MCP as well as her bilateral Achilles.  She does have tenderness to palpation of the MCPs and PIPs bilaterally as well.  Decreased fist formation and  strength bilaterally.  Previous laboratory evaluations and imaging studies were reviewed, results below.    Discussed the diagnosis of rheumatoid arthritis with the patient today.  We discussed the difference between inflammatory and noninflammatory arthritis.  Discussed the treatment of rheumatoid arthritis including immunomodulatory medications.  She does have some rheumatoid nodules, therefore discussed the risk of utilizing methotrexate.  Given the activity of her disease, she would prefer to start with methotrexate and she will monitor for any increase in nodules or increasing in size of the nodules that she currently has.  If that does happen we will stop the methotrexate and look at different treatment options.  She was on a sulfa antibiotic in the past and got mouth sores, therefore we could utilize sulfasalazine as it is not a " true sulfa allergy.  Hydroxychloroquine may be an option as well, however given the erosions that she has on x-ray, it may not be enough to control the disease.  This was all discussed with the patient today.  We will check baseline labs today and again in 1 month after starting the medication.  We will also get an x-ray of her cervical spine as she does states she has pain at the base of her skull.  Plan to have the patient follow-up with me in 2 months, sooner if needed.      We could always utilize low-dose prednisone in the future if needed for flares.  Higher doses of prednisone do cause her to feel jittery and have trouble sleeping.    Plan:     Schedule follow-up with Clarissa Griffin PA-C in 2 months.   Imaging: xray cervical spine  Labs: CBC, Creatinine, Albumin, AST, ALT, CRP and Sed Rate today and in 1 month  Medication recommendations:   Methotrexate 2.5mg: Will start you on 10mg (4 tablets) WEEKLY for 2 weeks, if tolerated increase dose to 15mg (6 tablets) WEEKLY.. While on Methotrexate:  -check labs (ALT/AST, albumin, CBC with platelets and creatinine) every 3 months  -Limit alcohol to 2 drinks weekly  -Take Folic Acid 1mg daily   -Tylenol 500-1000mg can be used as needed up to three times daily for nausea/headache associated with dosing  # Methotrexate Risks and Benefits: The risks and benefits of methotrexate were discussed in detail and the patient verbalized understanding.  The risks discussed include, but are not limited to, the risk for hypersensitivity, anaphylaxis, anaphylactoid reactions, infections, bone marrow suppression, renal toxicity, hepatotoxicity, pulmonary toxicity, malignancy, impaired fertility, GI upset, alopecia, and oral and nasal sores.  Folic acid supplementation is recommended during methotrexate therapy to help prevent some of the side effects. Pregnancy prevention and planning was discussed; it is recommended that women of childbearing potential use reliable contraception  during therapy.  The risks of taking both methotrexate and alcohol were reviewed; complete alcohol avoidance was discussed.  Routine laboratory monitoring is required during methotrexate therapy. Taking MTX once weekly, all within a 24 hour period was stressed and the patient verbalized this instruction back to me.  I encouraged reviewing the package insert and asking any questions about the medication.    WILI Marcos  Rheumatology         History of Present Illness:   Mirian Cruz presents for evaluation of rheumatoid arthritis.      She reports having increased pain in her hands. She has a nodule on her hand. She has pain in the back of her neck, shoulders and hips. She has some  bumps on her heels. She has stiffness in the mornings. This lasts a couple of hours until her tylenol and ibuprofen kicks in. She has not been seeing rheumatology. She saw someone over 13 years ago. Her hands have been bothering her for many years. The neck and shoulders have been bothering her for the last 6 months. The shoulder pain started out in the left shoulder and then moved to the right shoulder. Hip pain is intermittent. She has occasional trouble raising her arms above her head. No headaches, double vision or jaw pain. No fevers. No skin rashes. She does report decreased energy. 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  No shortness of breath.     Prednisone helps, but makes her heart race and makes her feel jittery and caused her to have trouble sleeping.          Review of Systems:     Constitutional: negative  Skin: negative  Eyes: negative  Ears/Nose/Throat: negative  Respiratory: No shortness of breath, dyspnea on exertion, cough, or hemoptysis  Cardiovascular: negative  Gastrointestinal: negative  Genitourinary: negative  Musculoskeletal: as above  Neurologic: negative  Psychiatric: negative  Hematologic/Lymphatic/Immunologic: negative  Endocrine: negative          Active Problem List:     Patient Active Problem List    Diagnosis Date Noted    Rheumatoid arthritis involving both hands with positive rheumatoid factor (H) 07/20/2022     Priority: Medium     Not on DMARDs      Status post total left knee replacement 04/05/2017     Priority: Medium    Left knee DJD 04/05/2017     Priority: Medium    Prediabetes 04/13/2016     Priority: Medium    Status post total right knee replacement 02/18/2015     Priority: Medium    OA (osteoarthritis) of knee 02/10/2015     Priority: Medium    Urinary, incontinence, stress female 05/07/2013     Priority: Medium    Urethral hypermobility 05/07/2013     Priority: Medium    Advanced directives, counseling/discussion 01/10/2013     Priority: Medium     Patient does not have an Advance/Health Care Directive (HCD), declines information/referral.    Mai Whatleyon  January 10, 2013        Sleep related leg cramps 01/10/2013     Priority: Medium     Improve with gabapentin      Hyperlipidemia LDL goal <130 10/31/2010     Priority: Medium    GERD (gastroesophageal reflux disease) 10/27/2009     Priority: Medium    Morbid obesity (H) 10/27/2009     Priority: Medium    Tobacco use disorder 08/20/2008     Priority: Medium     Very rare use of cigarette, does use some e-cigarettes.  4/24/16 -- patient stopped all nicotine a few weeks ago              Past Medical History:   No past medical history on file.  Past Surgical History:   Procedure Laterality Date    ARTHROPLASTY KNEE Right 2/18/2015    Procedure: ARTHROPLASTY KNEE;  Surgeon: Zelalem Mendez MD;  Location: [facility]    ARTHROPLASTY KNEE Left 4/5/2017    Procedure: ARTHROPLASTY KNEE;  Surgeon: Zelalem Mendez MD;  Location: [facility]    BUNIONECTOMY VALENTINE AND REUBEN, COMBINED  3/25/2011    COMBINED BUNIONECTOMY CHEVRON AND REUBEN performed by TRISTON SEWELL at [facility]    CYSTOSCOPY  5/20/2013    Procedure: CYSTOSCOPY;;  Surgeon: Adan Morrison MD;  Location: [facility]    REMOVE HARDWARE  FOOT  2/15/2012    Procedure:REMOVE HARDWARE FOOT; Removal of hardware left foot; Surgeon:TRISTON SEWELL; Location:[facility]    SLING TRANSVAGINAL  5/20/2013    Procedure: SLING TRANSVAGINAL;  Transvaginal taping, cystoscopy;  Surgeon: Adan Morrison MD;  Location: [facility]    TUBAL LIGATION              Social History:     Social History     Socioeconomic History    Marital status:      Spouse name: Not on file    Number of children: 4    Years of education: Not on file    Highest education level: Not on file   Occupational History     Employer: [employer]   Tobacco Use    Smoking status: Light Smoker     Years: 30.00     Types: Cigarettes     Passive exposure: Current    Smokeless tobacco: Never    Tobacco comments:     less than 1 ppw   Vaping Use    Vaping Use: Never used   Substance and Sexual Activity    Alcohol use: Yes     Alcohol/week: 0.0 standard drinks of alcohol     Comment: occ    Drug use: No    Sexual activity: Not Currently     Partners: Male   Other Topics Concern    Parent/sibling w/ CABG, MI or angioplasty before 65F 55M? No   Social History Narrative    Not on file     Social Determinants of Health     Financial Resource Strain: Not on file   Food Insecurity: Not on file   Transportation Needs: Not on file   Physical Activity: Not on file   Stress: Not on file   Social Connections: Not on file   Intimate Partner Violence: Not on file   Housing Stability: Not on file          Family History:     Family History   Problem Relation Age of Onset    Diabetes Mother     Gastrointestinal Disease Father         colon polyps    Diabetes Maternal Grandmother             Allergies:     Allergies   Allergen Reactions    Septra [Sulfamethoxazole-Trimethoprim] Other (See Comments)     Stomatitis with mouth ulcerations    Influenza Vaccine Live Other (See Comments)     History of Guillain-[place]            Medications:     Current Outpatient Medications   Medication Sig Dispense Refill     "ascorbic acid (VITAMIN C) 500 MG tablet Take 1 tablet by mouth daily. 100 tablet 12    Calcium Citrate-Vitamin D (CALCIUM + D PO) Take 1 tablet by mouth daily      cholecalciferol (VITAMIN D) 1000 UNIT tablet Take 1 tablet by mouth daily.      cyanocobalamin (VITAMIN B-12) 100 MCG tablet Take 100 mcg by mouth daily      Ferrous Sulfate (IRON SUPPLEMENT PO) Take 325 mg by mouth every other day      FISH OIL 1000 MG OR CAPS 1 daily      MAGNESIUM PO Take 1 tablet by mouth daily      omeprazole (PRILOSEC) 40 MG DR capsule Take 1 capsule (40 mg) by mouth daily Take 30-60 minutes before a meal. 90 capsule 3    POTASSIUM PO Take 1 tablet by mouth daily      Turmeric 500 MG CAPS Take by mouth daily      vitamin E 400 UNIT capsule Take 1 capsule by mouth daily. 100 capsule 12            Physical Exam:   Height 1.619 m (5' 3.75\"), last menstrual period 05/20/2007, not currently breastfeeding.  Wt Readings from Last 6 Encounters:   06/06/23 100.2 kg (221 lb)   04/26/23 104.3 kg (230 lb)   09/22/22 104.9 kg (231 lb 4.8 oz)   07/20/22 104.1 kg (229 lb 6.4 oz)   03/24/21 99.8 kg (220 lb)   11/12/20 99.8 kg (220 lb)     Constitutional: well-developed, appearing stated age; cooperative  Eyes: nl conjunctiva, sclera  ENT: nl external ears, nose, hearing, lips, teeth, gums, throat. No mucositis.   No mucous membrane lesions, normal saliva pool  Neck: no mass or thyroid enlargement  Resp: lungs clear to auscultation  CV: RRR, no murmurs, rubs or gallops, no edema  Lymph: no cervical, supraclavicular or epitrochlear nodes  MS: The TMJ, neck, shoulder, elbow, wrist, MCP/PIP/DIP, knee, ankle, and foot MTP/IP joints were examined.  Active synovitis over multiple MCPs and PIPs bilaterally.  Decreased fist formation and  strength bilaterally.  Rheumatoid nodule on the left second MCP as well as the bilateral Achilles.    Skin: no nail pitting, alopecia, rash, nodules or lesions.   Psych: nl judgement, orientation, memory, affect.       "     Data:   Imagin2023 x-ray bilateral hand  IMPRESSION:   Severe arthrosis first CMC joints bilaterally. Mild-to-moderate  scattered arthrosis throughout the IP joints of the thumbs and  fingers. Mild scattered joint space narrowing MCP joints.     Small erosions are seen at the left index and middle finger MCP joint  with soft tissue swelling, a finding which can be seen with rheumatoid  arthritis. Soft tissue swelling elsewhere involving MCP joints on both  sides. No acute fracture on either side.    Laboratory:  2020  Creatinine 0.63, GFR greater than 90  CRP 77.4  CCP antibody 108  Rheumatoid factor 26  Sed rate 40    2022  CRP 6.5  Rheumatoid factor 84  CBC within normal limits  Sed rate 18

## 2023-07-12 ENCOUNTER — OFFICE VISIT (OUTPATIENT)
Dept: RHEUMATOLOGY | Facility: CLINIC | Age: 68
End: 2023-07-12
Attending: FAMILY MEDICINE
Payer: COMMERCIAL

## 2023-07-12 ENCOUNTER — ANCILLARY PROCEDURE (OUTPATIENT)
Dept: GENERAL RADIOLOGY | Facility: CLINIC | Age: 68
End: 2023-07-12
Attending: PHYSICIAN ASSISTANT
Payer: COMMERCIAL

## 2023-07-12 VITALS
BODY MASS INDEX: 37.25 KG/M2 | DIASTOLIC BLOOD PRESSURE: 85 MMHG | WEIGHT: 218.2 LBS | HEIGHT: 64 IN | OXYGEN SATURATION: 98 % | HEART RATE: 84 BPM | SYSTOLIC BLOOD PRESSURE: 153 MMHG | RESPIRATION RATE: 16 BRPM

## 2023-07-12 DIAGNOSIS — R76.8 CYCLIC CITRULLINATED PEPTIDE (CCP) ANTIBODY POSITIVE: ICD-10-CM

## 2023-07-12 DIAGNOSIS — M05.741 RHEUMATOID ARTHRITIS INVOLVING BOTH HANDS WITH POSITIVE RHEUMATOID FACTOR (H): Primary | ICD-10-CM

## 2023-07-12 DIAGNOSIS — M05.742 RHEUMATOID ARTHRITIS INVOLVING BOTH HANDS WITH POSITIVE RHEUMATOID FACTOR (H): ICD-10-CM

## 2023-07-12 DIAGNOSIS — M05.742 RHEUMATOID ARTHRITIS INVOLVING BOTH HANDS WITH POSITIVE RHEUMATOID FACTOR (H): Primary | ICD-10-CM

## 2023-07-12 DIAGNOSIS — M05.741 RHEUMATOID ARTHRITIS INVOLVING BOTH HANDS WITH POSITIVE RHEUMATOID FACTOR (H): ICD-10-CM

## 2023-07-12 DIAGNOSIS — Z79.899 HIGH RISK MEDICATION USE: ICD-10-CM

## 2023-07-12 LAB
ALBUMIN SERPL BCG-MCNC: 4.1 G/DL (ref 3.5–5.2)
ALT SERPL W P-5'-P-CCNC: 16 U/L (ref 0–50)
AST SERPL W P-5'-P-CCNC: 19 U/L (ref 0–45)
CREAT SERPL-MCNC: 0.85 MG/DL (ref 0.51–0.95)
CRP SERPL-MCNC: 21.32 MG/L
ERYTHROCYTE [DISTWIDTH] IN BLOOD BY AUTOMATED COUNT: 12.5 % (ref 10–15)
ERYTHROCYTE [SEDIMENTATION RATE] IN BLOOD BY WESTERGREN METHOD: 33 MM/HR (ref 0–30)
GFR SERPL CREATININE-BSD FRML MDRD: 75 ML/MIN/1.73M2
HCT VFR BLD AUTO: 42.7 % (ref 35–47)
HGB BLD-MCNC: 13.6 G/DL (ref 11.7–15.7)
MCH RBC QN AUTO: 28.4 PG (ref 26.5–33)
MCHC RBC AUTO-ENTMCNC: 31.9 G/DL (ref 31.5–36.5)
MCV RBC AUTO: 89 FL (ref 78–100)
PLATELET # BLD AUTO: 207 10E3/UL (ref 150–450)
RBC # BLD AUTO: 4.79 10E6/UL (ref 3.8–5.2)
WBC # BLD AUTO: 7.4 10E3/UL (ref 4–11)

## 2023-07-12 PROCEDURE — 72040 X-RAY EXAM NECK SPINE 2-3 VW: CPT | Mod: TC | Performed by: RADIOLOGY

## 2023-07-12 PROCEDURE — 86140 C-REACTIVE PROTEIN: CPT | Performed by: PHYSICIAN ASSISTANT

## 2023-07-12 PROCEDURE — 85027 COMPLETE CBC AUTOMATED: CPT | Performed by: PHYSICIAN ASSISTANT

## 2023-07-12 PROCEDURE — 82040 ASSAY OF SERUM ALBUMIN: CPT | Performed by: PHYSICIAN ASSISTANT

## 2023-07-12 PROCEDURE — 82565 ASSAY OF CREATININE: CPT | Performed by: PHYSICIAN ASSISTANT

## 2023-07-12 PROCEDURE — 84450 TRANSFERASE (AST) (SGOT): CPT | Performed by: PHYSICIAN ASSISTANT

## 2023-07-12 PROCEDURE — 99204 OFFICE O/P NEW MOD 45 MIN: CPT | Performed by: PHYSICIAN ASSISTANT

## 2023-07-12 PROCEDURE — 36415 COLL VENOUS BLD VENIPUNCTURE: CPT | Performed by: PHYSICIAN ASSISTANT

## 2023-07-12 PROCEDURE — 84460 ALANINE AMINO (ALT) (SGPT): CPT | Performed by: PHYSICIAN ASSISTANT

## 2023-07-12 PROCEDURE — 85652 RBC SED RATE AUTOMATED: CPT | Performed by: PHYSICIAN ASSISTANT

## 2023-07-12 RX ORDER — FOLIC ACID 1 MG/1
1 TABLET ORAL DAILY
Qty: 90 TABLET | Refills: 3 | Status: SHIPPED | OUTPATIENT
Start: 2023-07-12

## 2023-07-12 ASSESSMENT — PAIN SCALES - GENERAL: PAINLEVEL: EXTREME PAIN (8)

## 2023-07-12 NOTE — PATIENT INSTRUCTIONS
After Visit Instructions:     Thank you for coming to [facility] Rheumatology for your care. It is my goal to partner with you to help you reach your optimal state of health.       Plan:     Schedule follow-up with Clarissa Griffin PA-C in 2 months.   Imaging: xray cervical spine  Labs: CBC, Creatinine, Albumin, AST, ALT, CRP and Sed Rate today and in 1 month  Medication recommendations:   Methotrexate 2.5mg: Will start you on 10mg (4 tablets) WEEKLY for 2 weeks, if tolerated increase dose to 15mg (6 tablets) WEEKLY.. While on Methotrexate:  -check labs (ALT/AST, albumin, CBC with platelets and creatinine) every 3 months  -Limit alcohol to 2 drinks weekly  -Take Folic Acid 1mg daily   -Tylenol 500-1000mg can be used as needed up to three times daily for nausea/headache associated with dosing  # Methotrexate Risks and Benefits: The risks and benefits of methotrexate were discussed in detail and the patient verbalized understanding.  The risks discussed include, but are not limited to, the risk for hypersensitivity, anaphylaxis, anaphylactoid reactions, infections, bone marrow suppression, renal toxicity, hepatotoxicity, pulmonary toxicity, malignancy, impaired fertility, GI upset, alopecia, and oral and nasal sores.  Folic acid supplementation is recommended during methotrexate therapy to help prevent some of the side effects. Pregnancy prevention and planning was discussed; it is recommended that women of childbearing potential use reliable contraception during therapy.  The risks of taking both methotrexate and alcohol were reviewed; complete alcohol avoidance was discussed.  Routine laboratory monitoring is required during methotrexate therapy. Taking MTX once weekly, all within a 24 hour period was stressed and the patient verbalized this instruction back to me.  I encouraged reviewing the package insert and asking any questions about the medication.        Clarissa Griffin PA-C  [facility]  Rheumatology  [facility]    Contact information: [facility] Rheumatology  Clinic Number:  [phone]  Please call or send a [employer] message with any questions about your care

## 2023-09-04 DIAGNOSIS — K21.00 GASTROESOPHAGEAL REFLUX DISEASE WITH ESOPHAGITIS, UNSPECIFIED WHETHER HEMORRHAGE: Chronic | ICD-10-CM

## 2023-09-06 RX ORDER — OMEPRAZOLE 40 MG/1
CAPSULE, DELAYED RELEASE ORAL
Qty: 90 CAPSULE | Refills: 1 | Status: SHIPPED | OUTPATIENT
Start: 2023-09-06 | End: 2024-04-16

## 2023-09-14 NOTE — PROGRESS NOTES
Rheumatology Clinic Visit  [facility]  WILI Marcos     Mirian Cruz MRN# [id number]   YOB: 1955 Age: 68 year old   Date of Visit: 9/19/23  Primary care provider: [facility]          Assessment and Plan:     Rheumatoid arthritis  CCP antibody positive  High-risk medication use      Patient presents today for follow-up regarding her rheumatoid arthritis.  She does report improvement with the use of methotrexate.  She is no longer having any shoulder or neck pain.  She does however continue to notice symptoms in her hands.  She still has swelling and decreased fist formation.  Previous laboratory evaluations were reviewed, results below.    Patient is still showing active synovitis over her MCPs.  She has decreased fist formation bilaterally as well.  At this time would recommend that we increase the methotrexate as she has had improvement since starting it.  No side effects.  She will continue on the folic acid daily.  Recommend that she split the dose of the methotrexate and take 4 in the morning and 4 in the evening.  We will check labs today and then again in 1 month as we are increasing the dose.  She will follow-up with me in 3 months.      Plan:   Schedule follow-up with Clarissa Griffin PA-C in 3 months.   Labs: CBC, Creatinine, Albumin, AST, ALT, CRP and Sed Rate today and in 1 month  Medication recommendations:   Methotrexate 2.5mg: increase dose to 20mg (8 tablets) WEEKLY. While on Methotrexate:  -check labs (ALT/AST, albumin, CBC with platelets and creatinine) every 3 months  -Limit alcohol to 2 drinks weekly  -Take Folic Acid 1mg daily   -Tylenol 500-1000mg can be used as needed up to three times daily for nausea/headache associated with dosing    WILI Marcos  Rheumatology         History of Present Illness:   [place] Cruz presents for evaluation of rheumatoid arthritis.      Interval history September 19, 2023:  She states that she  has been doing good. She states that she is still unable to make a fist. Her left ring does stiffen up at times. She states that her shoulders and neck are doing much better. No mouth sores or GI upset. She takes her folic acid daily and methotrexate on Wednesdays. She has not needed any Tylenol or Ibuprofen for 2 weeks.     HPI from consult of July 12, 2023:  She reports having increased pain in her hands. She has a nodule on her hand. She has pain in the back of her neck, shoulders and hips. She has some  bumps on her heels. She has stiffness in the mornings. This lasts a couple of hours until her tylenol and ibuprofen kicks in. She has not been seeing rheumatology. She saw someone over 13 years ago. Her hands have been bothering her for many years. The neck and shoulders have been bothering her for the last 6 months. The shoulder pain started out in the left shoulder and then moved to the right shoulder. Hip pain is intermittent. She has occasional trouble raising her arms above her head. No headaches, double vision or jaw pain. No fevers. No skin rashes. She does report decreased energy. 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  No shortness of breath.     Prednisone helps, but makes her heart race and makes her feel jittery and caused her to have trouble sleeping.          Review of Systems:     Constitutional: negative  Skin: negative  Eyes: negative  Ears/Nose/Throat: negative  Respiratory: No shortness of breath, dyspnea on exertion, cough, or hemoptysis  Cardiovascular: negative  Gastrointestinal: negative  Genitourinary: negative  Musculoskeletal: as above  Neurologic: negative  Psychiatric: negative  Hematologic/Lymphatic/Immunologic: negative  Endocrine: negative         Active Problem List:     Patient Active Problem List    Diagnosis Date Noted    Rheumatoid arthritis involving both hands with positive rheumatoid factor (H) 07/20/2022     Priority: Medium      Not on DMARDs      Status post total left knee replacement 04/05/2017     Priority: Medium    Left knee DJD 04/05/2017     Priority: Medium    Prediabetes 04/13/2016     Priority: Medium    Status post total right knee replacement 02/18/2015     Priority: Medium    OA (osteoarthritis) of knee 02/10/2015     Priority: Medium    Urinary, incontinence, stress female 05/07/2013     Priority: Medium    Urethral hypermobility 05/07/2013     Priority: Medium    Advanced directives, counseling/discussion 01/10/2013     Priority: Medium     Patient does not have an Advance/Health Care Directive (HCD), declines information/referral.    Mia Muse  January 10, 2013        Sleep related leg cramps 01/10/2013     Priority: Medium     Improve with gabapentin      Hyperlipidemia LDL goal <130 10/31/2010     Priority: Medium    GERD (gastroesophageal reflux disease) 10/27/2009     Priority: Medium    Morbid obesity (H) 10/27/2009     Priority: Medium    Tobacco use disorder 08/20/2008     Priority: Medium     Very rare use of cigarette, does use some e-cigarettes.  4/24/16 -- patient stopped all nicotine a few weeks ago              Past Medical History:   History reviewed. No pertinent past medical history.  Past Surgical History:   Procedure Laterality Date    ARTHROPLASTY KNEE Right 2/18/2015    Procedure: ARTHROPLASTY KNEE;  Surgeon: Zelalem Mendez MD;  Location: [facility]    ARTHROPLASTY KNEE Left 4/5/2017    Procedure: ARTHROPLASTY KNEE;  Surgeon: Zelalem Mendez MD;  Location: [facility]    BUNIONECTOMY CHENICOLETTE AND REUBEN, COMBINED  3/25/2011    COMBINED BUNIONECTOMY CHENICOLETTE AND REUBEN performed by TRISTON SEWELL at [facility]    CYSTOSCOPY  5/20/2013    Procedure: CYSTOSCOPY;;  Surgeon: Adan Morrison MD;  Location: [facility]    REMOVE HARDWARE FOOT  2/15/2012    Procedure:REMOVE HARDWARE FOOT; Removal of hardware left foot; Surgeon:TRISTON SEWELL; Location:[facility]    SLING TRANSVAGINAL  5/20/2013     Procedure: SLING TRANSVAGINAL;  Transvaginal taping, cystoscopy;  Surgeon: Adan Morrison MD;  Location: [facility]    TUBAL LIGATION              Social History:     Social History     Socioeconomic History    Marital status:      Spouse name: Not on file    Number of children: 4    Years of education: Not on file    Highest education level: Not on file   Occupational History     Employer: [employer]   Tobacco Use    Smoking status: Light Smoker     Years: 30.00     Types: Cigarettes     Passive exposure: Current    Smokeless tobacco: Never    Tobacco comments:     less than 1 ppw   Vaping Use    Vaping Use: Never used   Substance and Sexual Activity    Alcohol use: Yes     Alcohol/week: 0.0 standard drinks of alcohol     Comment: occ    Drug use: No    Sexual activity: Not Currently     Partners: Male   Other Topics Concern    Parent/sibling w/ CABG, MI or angioplasty before 65F 55M? No   Social History Narrative    Not on file     Social Determinants of Health     Financial Resource Strain: Not on file   Food Insecurity: Not on file   Transportation Needs: Not on file   Physical Activity: Not on file   Stress: Not on file   Social Connections: Not on file   Intimate Partner Violence: Not on file   Housing Stability: Not on file          Family History:     Family History   Problem Relation Age of Onset    Diabetes Mother     Gastrointestinal Disease Father         colon polyps    Diabetes Maternal Grandmother             Allergies:     Allergies   Allergen Reactions    Septra [Sulfamethoxazole-Trimethoprim] Other (See Comments)     Stomatitis with mouth ulcerations    Influenza Vaccine Live Other (See Comments)     History of Guillain-[place]            Medications:     Current Outpatient Medications   Medication Sig Dispense Refill    ascorbic acid (VITAMIN C) 500 MG tablet Take 1 tablet by mouth daily. 100 tablet 12    Calcium Citrate-Vitamin D (CALCIUM + D PO) Take 1 tablet by mouth daily       "cholecalciferol (VITAMIN D) 1000 UNIT tablet Take 1 tablet by mouth daily.      cyanocobalamin (VITAMIN B-12) 100 MCG tablet Take 100 mcg by mouth daily      Ferrous Sulfate (IRON SUPPLEMENT PO) Take 325 mg by mouth three times a week      FISH OIL 1000 MG OR CAPS 1 daily      folic acid (FOLVITE) 1 MG tablet Take 1 tablet (1 mg) by mouth daily 90 tablet 3    MAGNESIUM PO Take 1 tablet by mouth daily      methotrexate 2.5 MG tablet Take 10mg (4 tablets) WEEKLY for 2 weeks, then increase to 6 tablets WEEKLY. Labs every 8 - 12 weeks for refills. (Patient taking differently: Take 10mg (4 tablets) WEEKLY for 2 weeks, then increase to 6 tablets WEEKLY. Labs every 8 - 12 weeks for refills.  09/19/23 6 tablets weekly) 72 tablet 0    omeprazole (PRILOSEC) 40 MG DR capsule TAKE 1 CAPSULE BY MOUTH ONCE DAILY 30-60 MINUTES PRIOR TO A MEAL 90 capsule 1    POTASSIUM PO Take 1 tablet by mouth daily      Turmeric 500 MG CAPS Take by mouth daily      vitamin E 400 UNIT capsule Take 1 capsule by mouth daily. (Patient not taking: Reported on 9/19/2023) 100 capsule 12            Physical Exam:   Blood pressure (!) 142/88, pulse 86, resp. rate 20, height 1.619 m (5' 3.75\"), weight 99.1 kg (218 lb 7.2 oz), last menstrual period 05/20/2007, SpO2 98 %, not currently breastfeeding.  Wt Readings from Last 6 Encounters:   07/12/23 99 kg (218 lb 3.2 oz)   06/06/23 100.2 kg (221 lb)   04/26/23 104.3 kg (230 lb)   09/22/22 104.9 kg (231 lb 4.8 oz)   07/20/22 104.1 kg (229 lb 6.4 oz)   03/24/21 99.8 kg (220 lb)     Constitutional: well-developed, appearing stated age; cooperative  Eyes: nl conjunctiva, sclera  ENT: nl external ears, nose, hearing, lips,   Neck: no mass or thyroid enlargement  Resp: No shortness of breath with normal conversation  MS: Active synovitis over multiple MCPs and PIPs bilaterally.  Decreased fist formation and  strength bilaterally.  Rheumatoid nodule on the left second MCP    Psych: nl judgement, orientation, " memory, affect.           Data:   Imagin2023 x-ray bilateral hand  IMPRESSION:   Severe arthrosis first CMC joints bilaterally. Mild-to-moderate  scattered arthrosis throughout the IP joints of the thumbs and  fingers. Mild scattered joint space narrowing MCP joints.     Small erosions are seen at the left index and middle finger MCP joint  with soft tissue swelling, a finding which can be seen with rheumatoid  arthritis. Soft tissue swelling elsewhere involving MCP joints on both  sides. No acute fracture on either side.    CERVICAL SPINE 2/3 VIEWS 2023                                                             IMPRESSION: There is normal alignment of the cervical vertebrae;  however, there is straightening of normal cervical lordosis. Vertebral  body heights of the cervical spine are normal. Craniocervical  alignment is normal. There is no evidence for fracture of the cervical  spine. Loss of disc space height and degenerative endplate spurring at  C3-C4, C4-C5, C5-C6 and C6-C7. Mild to moderate facet arthropathy  throughout the cervical spine.    Laboratory:  2020  Creatinine 0.63, GFR greater than 90  CRP 77.4  CCP antibody 108  Rheumatoid factor 26  Sed rate 40    2022  CRP 6.5  Rheumatoid factor 84  CBC within normal limits  Sed rate 18    2023  Creatinine 0.85, GFR 75  Albumin 4.1  ALT 16, AST 19  CRP 21.32  White blood cell count 7.4, hemoglobin 13.6, platelet count 207  Sed rate 33

## 2023-09-19 ENCOUNTER — OFFICE VISIT (OUTPATIENT)
Dept: RHEUMATOLOGY | Facility: CLINIC | Age: 68
End: 2023-09-19
Payer: COMMERCIAL

## 2023-09-19 VITALS
HEIGHT: 64 IN | RESPIRATION RATE: 20 BRPM | BODY MASS INDEX: 37.3 KG/M2 | DIASTOLIC BLOOD PRESSURE: 88 MMHG | SYSTOLIC BLOOD PRESSURE: 142 MMHG | OXYGEN SATURATION: 98 % | HEART RATE: 86 BPM | WEIGHT: 218.45 LBS

## 2023-09-19 DIAGNOSIS — M05.742 RHEUMATOID ARTHRITIS INVOLVING BOTH HANDS WITH POSITIVE RHEUMATOID FACTOR (H): Primary | ICD-10-CM

## 2023-09-19 DIAGNOSIS — M05.741 RHEUMATOID ARTHRITIS INVOLVING BOTH HANDS WITH POSITIVE RHEUMATOID FACTOR (H): Primary | ICD-10-CM

## 2023-09-19 DIAGNOSIS — R76.8 CYCLIC CITRULLINATED PEPTIDE (CCP) ANTIBODY POSITIVE: ICD-10-CM

## 2023-09-19 DIAGNOSIS — Z79.899 HIGH RISK MEDICATION USE: ICD-10-CM

## 2023-09-19 LAB
ALBUMIN SERPL BCG-MCNC: 4.3 G/DL (ref 3.5–5.2)
ALT SERPL W P-5'-P-CCNC: 19 U/L (ref 0–50)
AST SERPL W P-5'-P-CCNC: 23 U/L (ref 0–45)
CREAT SERPL-MCNC: 0.74 MG/DL (ref 0.51–0.95)
CRP SERPL-MCNC: 5.63 MG/L
EGFRCR SERPLBLD CKD-EPI 2021: 89 ML/MIN/1.73M2
ERYTHROCYTE [DISTWIDTH] IN BLOOD BY AUTOMATED COUNT: 13.2 % (ref 10–15)
ERYTHROCYTE [SEDIMENTATION RATE] IN BLOOD BY WESTERGREN METHOD: 19 MM/HR (ref 0–30)
HCT VFR BLD AUTO: 43.5 % (ref 35–47)
HGB BLD-MCNC: 14.4 G/DL (ref 11.7–15.7)
MCH RBC QN AUTO: 29.7 PG (ref 26.5–33)
MCHC RBC AUTO-ENTMCNC: 33.1 G/DL (ref 31.5–36.5)
MCV RBC AUTO: 90 FL (ref 78–100)
PLATELET # BLD AUTO: 200 10E3/UL (ref 150–450)
RBC # BLD AUTO: 4.85 10E6/UL (ref 3.8–5.2)
WBC # BLD AUTO: 6.8 10E3/UL (ref 4–11)

## 2023-09-19 PROCEDURE — 85027 COMPLETE CBC AUTOMATED: CPT | Performed by: PHYSICIAN ASSISTANT

## 2023-09-19 PROCEDURE — 82040 ASSAY OF SERUM ALBUMIN: CPT | Performed by: PHYSICIAN ASSISTANT

## 2023-09-19 PROCEDURE — 86140 C-REACTIVE PROTEIN: CPT | Performed by: PHYSICIAN ASSISTANT

## 2023-09-19 PROCEDURE — 99214 OFFICE O/P EST MOD 30 MIN: CPT | Performed by: PHYSICIAN ASSISTANT

## 2023-09-19 PROCEDURE — 82565 ASSAY OF CREATININE: CPT | Performed by: PHYSICIAN ASSISTANT

## 2023-09-19 PROCEDURE — 84450 TRANSFERASE (AST) (SGOT): CPT | Performed by: PHYSICIAN ASSISTANT

## 2023-09-19 PROCEDURE — 36415 COLL VENOUS BLD VENIPUNCTURE: CPT | Performed by: PHYSICIAN ASSISTANT

## 2023-09-19 PROCEDURE — 84460 ALANINE AMINO (ALT) (SGPT): CPT | Performed by: PHYSICIAN ASSISTANT

## 2023-09-19 PROCEDURE — 85652 RBC SED RATE AUTOMATED: CPT | Performed by: PHYSICIAN ASSISTANT

## 2023-09-19 ASSESSMENT — PAIN SCALES - GENERAL: PAINLEVEL: NO PAIN (0)

## 2023-09-19 NOTE — PATIENT INSTRUCTIONS
After Visit Instructions:     Thank you for coming to [facility] for your care. It is my goal to partner with you to help you reach your optimal state of health.     Plan:     Schedule follow-up with Clarissa Griffin PA-C in 3 months.   Labs: CBC, Creatinine, Albumin, AST, ALT, CRP and Sed Rate today and in 1 month  Medication recommendations:   Methotrexate 2.5mg: increase dose to 20mg (8 tablets) WEEKLY. While on Methotrexate:  -check labs (ALT/AST, albumin, CBC with platelets and creatinine) every 3 months  -Limit alcohol to 2 drinks weekly  -Take Folic Acid 1mg daily   -Tylenol 500-1000mg can be used as needed up to three times daily for nausea/headache associated with dosing        Clarissa Griffin PA-C  [facility]  [facility]    Contact information: [facility]  Clinic Number:  [phone]  Please call or send a [facility] message with any questions about your care

## 2023-12-13 NOTE — PROGRESS NOTES
Rheumatology Clinic Visit  [facility]  WILI Marcos     Mirian Cruz MRN# [id number]   YOB: 1955 Age: 68 year old   Date of Visit: 12/19/2023  Primary care provider: [facility]          Assessment and Plan:     Rheumatoid arthritis  CCP antibody positive  High-risk medication use      Patient presents today for follow-up regarding her rheumatoid arthritis.  She has not noticed any relief with the increased Methotrexate dose. She still has hand pain and stiffness.  Physical examination shows decreased fist formation.  Previous laboratory evaluations were reviewed, results below.    Patient is still showing active synovitis over her MCPs and decreased fist formation bilaterally as well.  Would recommend adding either Sulfasalazine or Hydroxychloroquine to the Methotrexate. After reviewing options, she would like to try Hydroxychloroquine. Side effects of this medication reviewed. She will continue on the folic acid daily.    We will check labs today and then again in 1 month as we are starting a new medication.  She will follow-up with me in 3 months.      Plan:   Schedule follow-up with Clarissa Griffin PA-C in 3 months.   Labs: CBC, Creatinine, Albumin, AST, ALT, CRP and Sed Rate today and in 1 month  Medication recommendations:   Methotrexate 2.5mg: Continue 20mg (8 tablets) WEEKLY. While on Methotrexate:  -check labs (ALT/AST, albumin, CBC with platelets and creatinine) every 3 months  -Limit alcohol to 2 drinks weekly  -Take Folic Acid 3mg daily   -Tylenol 500-1000mg can be used as needed up to three times daily for nausea/headache associated with dosing  Hydroxychloroquine: will start you on 200mg 2 times daily  -While on hydroxychloroquine it is important that you get an eye exam annually. Make sure to let your eye provider know that you are taking this medication.   -Labs (AST/ALT, creatinine, CBC with platelets) should be monitored every 3 months  #  Hydroxychloroquine (Plaquenil) Risks and Benefits:  The risks and benefits of hydroxychloroquine were discussed in detail and the patient verbalized understanding; the patient also verbalized agreement to get the required ophthalmologic toxicity monitoring.  The risks discussed include, but are not limited to, the risk for hypersensitivity, anaphylaxis, anaphylactoid reactions, irreversible retinal damage, rare hematologic reactions, and rare cardiomyopathy.  Patients with G6PD deficiency or hepatic impairment may be at an increased risk for adverse effects.  I encouraged reviewing the package insert and asking any questions about the medication.      Clarissa Griffin, [facility]  Rheumatology         History of Present Illness:   Mirian Cruz presents for evaluation of rheumatoid arthritis.      Interval history December 19, 2023:  She is still having some stiffness in her hands. She states that mornings have been pretty good. She had some stiffness in her shoulders but she felt this was related to the cold weather. Some days her hands are more sore than others. The increase dose of methotrexate has not made a difference in her hands.     Interval history September 19, 2023:  She states that she has been doing good. She states that she is still unable to make a fist. Her left ring does stiffen up at times. She states that her shoulders and neck are doing much better. No mouth sores or GI upset. She takes her folic acid daily and methotrexate on Wednesdays. She has not needed any Tylenol or Ibuprofen for 2 weeks.     HPI from consult of July 12, 2023:  She reports having increased pain in her hands. She has a nodule on her hand. She has pain in the back of her neck, shoulders and hips. She has some  bumps on her heels. She has stiffness in the mornings. This lasts a couple of hours until her tylenol and ibuprofen kicks in. She has not been seeing rheumatology. She saw someone over 13 years ago. Her hands have been  bothering her for many years. The neck and shoulders have been bothering her for the last 6 months. The shoulder pain started out in the left shoulder and then moved to the right shoulder. Hip pain is intermittent. She has occasional trouble raising her arms above her head. No headaches, double vision or jaw pain. No fevers. No skin rashes. She does report decreased energy. 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  No shortness of breath.     Prednisone helps, but makes her heart race and makes her feel jittery and caused her to have trouble sleeping.          Review of Systems:     Constitutional: negative  Skin: negative  Eyes: negative  Ears/Nose/Throat: negative  Respiratory: No shortness of breath, dyspnea on exertion, cough, or hemoptysis  Cardiovascular: negative  Gastrointestinal: negative  Genitourinary: negative  Musculoskeletal: as above  Neurologic: negative  Psychiatric: negative  Hematologic/Lymphatic/Immunologic: negative  Endocrine: negative         Active Problem List:     Patient Active Problem List    Diagnosis Date Noted    Rheumatoid arthritis involving both hands with positive rheumatoid factor (H) 07/20/2022     Priority: Medium     Not on DMARDs      Status post total left knee replacement 04/05/2017     Priority: Medium    Left knee DJD 04/05/2017     Priority: Medium    Prediabetes 04/13/2016     Priority: Medium    Status post total right knee replacement 02/18/2015     Priority: Medium    OA (osteoarthritis) of knee 02/10/2015     Priority: Medium    Urinary, incontinence, stress female 05/07/2013     Priority: Medium    Urethral hypermobility 05/07/2013     Priority: Medium    Advanced directives, counseling/discussion 01/10/2013     Priority: Medium     Patient does not have an Advance/Health Care Directive (HCD), declines information/referral.    Mai Muse  January 10, 2013        Sleep related leg cramps 01/10/2013     Priority: Medium      Improve with gabapentin      Hyperlipidemia LDL goal <130 10/31/2010     Priority: Medium    GERD (gastroesophageal reflux disease) 10/27/2009     Priority: Medium    Morbid obesity (H) 10/27/2009     Priority: Medium    Tobacco use disorder 08/20/2008     Priority: Medium     Very rare use of cigarette, does use some e-cigarettes.  4/24/16 -- patient stopped all nicotine a few weeks ago              Past Medical History:   No past medical history on file.  Past Surgical History:   Procedure Laterality Date    ARTHROPLASTY KNEE Right 2/18/2015    Procedure: ARTHROPLASTY KNEE;  Surgeon: Zelalem Mendez MD;  Location: [facility]    ARTHROPLASTY KNEE Left 4/5/2017    Procedure: ARTHROPLASTY KNEE;  Surgeon: Zelalem Mendez MD;  Location: [facility]    BUNIONECTOMY CHEVRON AND REUBEN, COMBINED  3/25/2011    COMBINED BUNIONECTOMY CHEVRON AND REUBEN performed by TRISTON SEWELL at [facility]    CYSTOSCOPY  5/20/2013    Procedure: CYSTOSCOPY;;  Surgeon: Adan Morrison MD;  Location: [facility]    REMOVE HARDWARE FOOT  2/15/2012    Procedure:REMOVE HARDWARE FOOT; Removal of hardware left foot; Surgeon:TRISTON SEWELL; Location:[facility]    SLING TRANSVAGINAL  5/20/2013    Procedure: SLING TRANSVAGINAL;  Transvaginal taping, cystoscopy;  Surgeon: Adan oMrrison MD;  Location: [facility]    TUBAL LIGATION              Social History:     Social History     Socioeconomic History    Marital status:      Spouse name: Not on file    Number of children: 4    Years of education: Not on file    Highest education level: Not on file   Occupational History     Employer: [employer]   Tobacco Use    Smoking status: Light Smoker     Years: 30     Types: Cigarettes     Passive exposure: Current    Smokeless tobacco: Never    Tobacco comments:     less than 1 ppw   Vaping Use    Vaping Use: Never used   Substance and Sexual Activity    Alcohol use: Yes     Alcohol/week: 0.0 standard drinks of alcohol     Comment: occ    Drug  use: No    Sexual activity: Not Currently     Partners: Male   Other Topics Concern    Parent/sibling w/ CABG, MI or angioplasty before 65F 55M? No   Social History Narrative    Not on file     Social Determinants of Health     Financial Resource Strain: Not on file   Food Insecurity: Not on file   Transportation Needs: Not on file   Physical Activity: Not on file   Stress: Not on file   Social Connections: Not on file   Interpersonal Safety: Not on file   Housing Stability: Not on file          Family History:     Family History   Problem Relation Age of Onset    Diabetes Mother     Gastrointestinal Disease Father         colon polyps    Diabetes Maternal Grandmother             Allergies:     Allergies   Allergen Reactions    Septra [Sulfamethoxazole-Trimethoprim] Other (See Comments)     Stomatitis with mouth ulcerations    Influenza Vaccine Live Other (See Comments)     History of Guillain-Warriors Mark            Medications:     Current Outpatient Medications   Medication Sig Dispense Refill    ascorbic acid (VITAMIN C) 500 MG tablet Take 1 tablet by mouth daily. 100 tablet 12    Calcium Citrate-Vitamin D (CALCIUM + D PO) Take 1 tablet by mouth daily      cholecalciferol (VITAMIN D) 1000 UNIT tablet Take 1 tablet by mouth daily.      cyanocobalamin (VITAMIN B-12) 100 MCG tablet Take 100 mcg by mouth daily      Ferrous Sulfate (IRON SUPPLEMENT PO) Take 325 mg by mouth three times a week      FISH OIL 1000 MG OR CAPS 1 daily      folic acid (FOLVITE) 1 MG tablet Take 1 tablet (1 mg) by mouth daily 90 tablet 3    hydroxychloroquine (PLAQUENIL) 200 MG tablet Take 1 tablet (200 mg) by mouth 2 times daily Annual Plaquenil toxicity eye screening required. 60 tablet 2    MAGNESIUM PO Take 1 tablet by mouth daily      methotrexate 2.5 MG tablet Take 8 tablets (20 mg) by mouth every 7 days Labs every 8 - 12 weeks for refills. 96 tablet 0    omeprazole (PRILOSEC) 40 MG DR capsule TAKE 1 CAPSULE BY MOUTH ONCE DAILY 30-60 MINUTES  PRIOR TO A MEAL 90 capsule 1    POTASSIUM PO Take 1 tablet by mouth daily      Turmeric 500 MG CAPS Take by mouth daily      vitamin E 400 UNIT capsule Take 1 capsule by mouth daily 100 capsule 12            Physical Exam:   Blood pressure (!) 147/75, pulse 89, temperature 97.6  F (36.4  C), temperature source Tympanic, resp. rate 18, weight 96.6 kg (213 lb), last menstrual period 2007, SpO2 98%, not currently breastfeeding.  Wt Readings from Last 6 Encounters:   23 96.6 kg (213 lb)   23 99.1 kg (218 lb 7.2 oz)   23 99 kg (218 lb 3.2 oz)   23 100.2 kg (221 lb)   23 104.3 kg (230 lb)   22 104.9 kg (231 lb 4.8 oz)     Constitutional: well-developed, appearing stated age; cooperative  Eyes: nl conjunctiva, sclera  ENT: nl external ears, nose, hearing, lips,   Neck: no mass or thyroid enlargement  Resp: No shortness of breath with normal conversation  MS: Active synovitis over multiple MCPs and PIPs bilaterally.  Decreased fist formation and  strength bilaterally.  Rheumatoid nodule on the left second MCP (unchanged)   Psych: nl judgement, orientation, memory, affect.           Data:   Imagin2023 x-ray bilateral hand  IMPRESSION:   Severe arthrosis first CMC joints bilaterally. Mild-to-moderate  scattered arthrosis throughout the IP joints of the thumbs and  fingers. Mild scattered joint space narrowing MCP joints.     Small erosions are seen at the left index and middle finger MCP joint  with soft tissue swelling, a finding which can be seen with rheumatoid  arthritis. Soft tissue swelling elsewhere involving MCP joints on both  sides. No acute fracture on either side.    CERVICAL SPINE 2/3 VIEWS 2023                                                             IMPRESSION: There is normal alignment of the cervical vertebrae;  however, there is straightening of normal cervical lordosis. Vertebral  body heights of the cervical spine are normal.  Craniocervical  alignment is normal. There is no evidence for fracture of the cervical  spine. Loss of disc space height and degenerative endplate spurring at  C3-C4, C4-C5, C5-C6 and C6-C7. Mild to moderate facet arthropathy  throughout the cervical spine.    Laboratory:  5/27/2020  Creatinine 0.63, GFR greater than 90  CRP 77.4  CCP antibody 108  Rheumatoid factor 26  Sed rate 40    7/20/2022  CRP 6.5  Rheumatoid factor 84  CBC within normal limits  Sed rate 18    7/12/2023  Creatinine 0.85, GFR 75  Albumin 4.1  ALT 16, AST 19  CRP 21.32  White blood cell count 7.4, hemoglobin 13.6, platelet count 207  Sed rate 33    9/19/2023  Creatinine 0.74, GFR 89  Albumin 4.3  ALT 19, AST 23  CRP 5.63  White blood cell count 6.8, hemoglobin 14.4, platelet count 200  Sed rate 19

## 2023-12-19 ENCOUNTER — OFFICE VISIT (OUTPATIENT)
Dept: RHEUMATOLOGY | Facility: CLINIC | Age: 68
End: 2023-12-19
Payer: COMMERCIAL

## 2023-12-19 VITALS
TEMPERATURE: 97.6 F | DIASTOLIC BLOOD PRESSURE: 75 MMHG | OXYGEN SATURATION: 98 % | SYSTOLIC BLOOD PRESSURE: 147 MMHG | BODY MASS INDEX: 36.85 KG/M2 | WEIGHT: 213 LBS | RESPIRATION RATE: 18 BRPM | HEART RATE: 89 BPM

## 2023-12-19 DIAGNOSIS — Z79.899 HIGH RISK MEDICATION USE: ICD-10-CM

## 2023-12-19 DIAGNOSIS — M05.741 RHEUMATOID ARTHRITIS INVOLVING BOTH HANDS WITH POSITIVE RHEUMATOID FACTOR (H): ICD-10-CM

## 2023-12-19 DIAGNOSIS — R76.8 CYCLIC CITRULLINATED PEPTIDE (CCP) ANTIBODY POSITIVE: ICD-10-CM

## 2023-12-19 DIAGNOSIS — M05.742 RHEUMATOID ARTHRITIS INVOLVING BOTH HANDS WITH POSITIVE RHEUMATOID FACTOR (H): ICD-10-CM

## 2023-12-19 DIAGNOSIS — L98.9 SKIN LESION: Primary | ICD-10-CM

## 2023-12-19 LAB
ALBUMIN SERPL BCG-MCNC: 4.1 G/DL (ref 3.5–5.2)
ALT SERPL W P-5'-P-CCNC: 19 U/L (ref 0–50)
AST SERPL W P-5'-P-CCNC: 23 U/L (ref 0–45)
CREAT SERPL-MCNC: 0.78 MG/DL (ref 0.51–0.95)
CRP SERPL-MCNC: 7 MG/L
EGFRCR SERPLBLD CKD-EPI 2021: 83 ML/MIN/1.73M2
ERYTHROCYTE [DISTWIDTH] IN BLOOD BY AUTOMATED COUNT: 12.5 % (ref 10–15)
ERYTHROCYTE [SEDIMENTATION RATE] IN BLOOD BY WESTERGREN METHOD: 24 MM/HR (ref 0–30)
HCT VFR BLD AUTO: 39.6 % (ref 35–47)
HGB BLD-MCNC: 13.2 G/DL (ref 11.7–15.7)
MCH RBC QN AUTO: 31 PG (ref 26.5–33)
MCHC RBC AUTO-ENTMCNC: 33.3 G/DL (ref 31.5–36.5)
MCV RBC AUTO: 93 FL (ref 78–100)
PLATELET # BLD AUTO: 207 10E3/UL (ref 150–450)
RBC # BLD AUTO: 4.26 10E6/UL (ref 3.8–5.2)
WBC # BLD AUTO: 7.5 10E3/UL (ref 4–11)

## 2023-12-19 PROCEDURE — 36415 COLL VENOUS BLD VENIPUNCTURE: CPT | Performed by: PHYSICIAN ASSISTANT

## 2023-12-19 PROCEDURE — 85027 COMPLETE CBC AUTOMATED: CPT | Performed by: PHYSICIAN ASSISTANT

## 2023-12-19 PROCEDURE — 99214 OFFICE O/P EST MOD 30 MIN: CPT | Performed by: PHYSICIAN ASSISTANT

## 2023-12-19 PROCEDURE — 84460 ALANINE AMINO (ALT) (SGPT): CPT | Performed by: PHYSICIAN ASSISTANT

## 2023-12-19 PROCEDURE — 82040 ASSAY OF SERUM ALBUMIN: CPT | Performed by: PHYSICIAN ASSISTANT

## 2023-12-19 PROCEDURE — 84450 TRANSFERASE (AST) (SGOT): CPT | Performed by: PHYSICIAN ASSISTANT

## 2023-12-19 PROCEDURE — 86140 C-REACTIVE PROTEIN: CPT | Performed by: PHYSICIAN ASSISTANT

## 2023-12-19 PROCEDURE — 85652 RBC SED RATE AUTOMATED: CPT | Performed by: PHYSICIAN ASSISTANT

## 2023-12-19 PROCEDURE — 82565 ASSAY OF CREATININE: CPT | Performed by: PHYSICIAN ASSISTANT

## 2023-12-19 RX ORDER — HYDROXYCHLOROQUINE SULFATE 200 MG/1
200 TABLET, FILM COATED ORAL 2 TIMES DAILY
Qty: 60 TABLET | Refills: 2 | Status: SHIPPED | OUTPATIENT
Start: 2023-12-19 | End: 2024-03-19 | Stop reason: SINTOL

## 2023-12-19 RX ORDER — METHOTREXATE 2.5 MG/1
20 TABLET ORAL
Qty: 96 TABLET | Refills: 0 | Status: SHIPPED | OUTPATIENT
Start: 2023-12-19 | End: 2024-03-19

## 2023-12-19 NOTE — NURSING NOTE
Chief Complaint   Patient presents with    POLYMYLAGIA RHEUMATICA     3 month recheck       Vitals:    12/19/23 1117   Pulse: 89   Resp: 18   SpO2: 98%   Weight: 96.6 kg (213 lb)     Wt Readings from Last 1 Encounters:   12/19/23 96.6 kg (213 lb)     Piper Garcia MA      
Statement Selected

## 2023-12-19 NOTE — PATIENT INSTRUCTIONS
After Visit Instructions:     Thank you for coming to [facility]ology for your care. It is my goal to partner with you to help you reach your optimal state of health.     Plan:     Schedule follow-up with Clarissa Griffin PA-C in 3 months.   Labs: CBC, Creatinine, Albumin, AST, ALT, CRP and Sed Rate today and in 1 month  Medication recommendations:   Methotrexate 2.5mg: Continue 20mg (8 tablets) WEEKLY. While on Methotrexate:  -check labs (ALT/AST, albumin, CBC with platelets and creatinine) every 3 months  -Limit alcohol to 2 drinks weekly  -Take Folic Acid 3mg daily   -Tylenol 500-1000mg can be used as needed up to three times daily for nausea/headache associated with dosing  Hydroxychloroquine: will start you on 200mg 2 times daily  -While on hydroxychloroquine it is important that you get an eye exam annually. Make sure to let your eye provider know that you are taking this medication.   -Labs (AST/ALT, creatinine, CBC with platelets) should be monitored every 3 months  # Hydroxychloroquine (Plaquenil) Risks and Benefits:  The risks and benefits of hydroxychloroquine were discussed in detail and the patient verbalized understanding; the patient also verbalized agreement to get the required ophthalmologic toxicity monitoring.  The risks discussed include, but are not limited to, the risk for hypersensitivity, anaphylaxis, anaphylactoid reactions, irreversible retinal damage, rare hematologic reactions, and rare cardiomyopathy.  Patients with G6PD deficiency or hepatic impairment may be at an increased risk for adverse effects.  I encouraged reviewing the package insert and asking any questions about the medication.            Clarissa Griffin PA-C  [facility]  [facility]    Contact information: [facility]  Clinic Number:  [phone]  Please call or send a [facility] message with any questions about your care

## 2024-01-03 ENCOUNTER — MYC MEDICAL ADVICE (OUTPATIENT)
Dept: RHEUMATOLOGY | Facility: CLINIC | Age: 69
End: 2024-01-03
Payer: COMMERCIAL

## 2024-01-04 NOTE — TELEPHONE ENCOUNTER
Pt was started on Hydroxychloroquine on 12/19/23. Please see mychart and advise. Thank you.  Tawny ARANDA RN BSN PHN  Specialty Clinics

## 2024-02-15 NOTE — TELEPHONE ENCOUNTER
30 day supply sent to pharmacy. Please schedule virtual visit with PCP prior to next refill. Thanks!  Lynn Arevalo DNP, NP-C 4/15/2020 10:09 AM      No

## 2024-03-13 NOTE — PROGRESS NOTES
Rheumatology Clinic Visit  [facility]  WILI Marcos     Mirian Cruz MRN# [id number]   YOB: 1955 Age: 68 year old   Date of Visit: 3/19/2024  Primary care provider: [facility]          Assessment and Plan:     Rheumatoid arthritis  CCP antibody positive  High-risk medication use    Patient presents today for follow-up regarding her rheumatoid arthritis. She was unable to tolerate hydroxychloroquine even at reduced doses.  Currently she feels that her rheumatoid arthritis is under control and she would like to continue on her current regimen of methotrexate 20 mg weekly.  We will check her monitoring labs today and then every 3 months.   If she has an increase in her joint symptoms at her next visit would discuss biologic therapy with the patient.  For now we will continue to monitor her symptoms and she will follow-up with me in 3 to 4 months.    The longitudinal plan of care for the diagnosis(es)/condition(s) as documented were addressed during this visit. Due to the added complexity in care, I will continue to support Valeria in the subsequent management and with ongoing continuity of care.      Plan:   Schedule follow-up with Clarissa Griffin PA-C in 3-4 months.   Labs: CBC, Creatinine, Albumin, AST, ALT, CRP and Sed Rate today and every 3 months  Medication recommendations:   Methotrexate 2.5mg: Continue 20mg (8 tablets) WEEKLY. While on Methotrexate:  -check labs (ALT/AST, albumin, CBC with platelets and creatinine) every 3 months  -Limit alcohol to 2 drinks weekly  -Take Folic Acid 3mg daily   -Tylenol 500-1000mg can be used as needed up to three times daily for nausea/headache associated with dosing    WILI Marcos  Rheumatology         History of Present Illness:   Mirian Cruz presents for evaluation of rheumatoid arthritis.      Interval history March 19, 2024  She has had some numbness/tingling in her left hand. She is wondering if it is  her carpal tunnel. She was going to get it fixed but as it was doing good, but as she is more symptomatic she is looking into it. She stopped the Hydroxychloroquine as it upset her stomach even with a lower dose. She has occasional shoulder pain, but feels this is weather related. She has not had any troubles in the mornings.     Interval history December 19, 2023:  She is still having some stiffness in her hands. She states that mornings have been pretty good. She had some stiffness in her shoulders but she felt this was related to the cold weather. Some days her hands are more sore than others. The increase dose of methotrexate has not made a difference in her hands.     Interval history September 19, 2023:  She states that she has been doing good. She states that she is still unable to make a fist. Her left ring does stiffen up at times. She states that her shoulders and neck are doing much better. No mouth sores or GI upset. She takes her folic acid daily and methotrexate on Wednesdays. She has not needed any Tylenol or Ibuprofen for 2 weeks.     HPI from consult of July 12, 2023:  She reports having increased pain in her hands. She has a nodule on her hand. She has pain in the back of her neck, shoulders and hips. She has some  bumps on her heels. She has stiffness in the mornings. This lasts a couple of hours until her tylenol and ibuprofen kicks in. She has not been seeing rheumatology. She saw someone over 13 years ago. Her hands have been bothering her for many years. The neck and shoulders have been bothering her for the last 6 months. The shoulder pain started out in the left shoulder and then moved to the right shoulder. Hip pain is intermittent. She has occasional trouble raising her arms above her head. No headaches, double vision or jaw pain. No fevers. No skin rashes. She does report decreased energy. 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  No shortness of  breath.     Prednisone helps, but makes her heart race and makes her feel jittery and caused her to have trouble sleeping.          Review of Systems:     Constitutional: negative  Skin: negative  Eyes: negative  Ears/Nose/Throat: negative  Respiratory: No shortness of breath, dyspnea on exertion, cough, or hemoptysis  Cardiovascular: negative  Gastrointestinal: negative  Genitourinary: negative  Musculoskeletal: as above  Neurologic: negative  Psychiatric: negative  Hematologic/Lymphatic/Immunologic: negative  Endocrine: negative         Active Problem List:     Patient Active Problem List    Diagnosis Date Noted    Rheumatoid arthritis involving both hands with positive rheumatoid factor (H) 07/20/2022     Priority: Medium     Not on DMARDs      Status post total left knee replacement 04/05/2017     Priority: Medium    Left knee DJD 04/05/2017     Priority: Medium    Prediabetes 04/13/2016     Priority: Medium    Status post total right knee replacement 02/18/2015     Priority: Medium    OA (osteoarthritis) of knee 02/10/2015     Priority: Medium    Urinary, incontinence, stress female 05/07/2013     Priority: Medium    Urethral hypermobility 05/07/2013     Priority: Medium    Advanced directives, counseling/discussion 01/10/2013     Priority: Medium     Patient does not have an Advance/Health Care Directive (HCD), declines information/referral.    Mai Almaz  January 10, 2013        Sleep related leg cramps 01/10/2013     Priority: Medium     Improve with gabapentin      Hyperlipidemia LDL goal <130 10/31/2010     Priority: Medium    GERD (gastroesophageal reflux disease) 10/27/2009     Priority: Medium    Morbid obesity (H) 10/27/2009     Priority: Medium    Tobacco use disorder 08/20/2008     Priority: Medium     Very rare use of cigarette, does use some e-cigarettes.  4/24/16 -- patient stopped all nicotine a few weeks ago              Past Medical History:   History reviewed. No pertinent past medical  history.  Past Surgical History:   Procedure Laterality Date    ARTHROPLASTY KNEE Right 2/18/2015    Procedure: ARTHROPLASTY KNEE;  Surgeon: Zelalem Mendez MD;  Location: [facility]    ARTHROPLASTY KNEE Left 4/5/2017    Procedure: ARTHROPLASTY KNEE;  Surgeon: Zelalem Mendez MD;  Location: [facility]    BUNIONECTOMY CHEVRON AND REUBEN, COMBINED  3/25/2011    COMBINED BUNIONECTOMY CHEVRON AND REUBEN performed by TRISTON SEWELL at [facility]    CYSTOSCOPY  5/20/2013    Procedure: CYSTOSCOPY;;  Surgeon: Adan Morrison MD;  Location: [facility]    REMOVE HARDWARE FOOT  2/15/2012    Procedure:REMOVE HARDWARE FOOT; Removal of hardware left foot; Surgeon:TRISTON SEWELL; Location:[facility]    SLING TRANSVAGINAL  5/20/2013    Procedure: SLING TRANSVAGINAL;  Transvaginal taping, cystoscopy;  Surgeon: Adan Morrison MD;  Location: [facility]    TUBAL LIGATION              Social History:     Social History     Socioeconomic History    Marital status:      Spouse name: Not on file    Number of children: 4    Years of education: Not on file    Highest education level: Not on file   Occupational History     Employer: [employer]   Tobacco Use    Smoking status: Light Smoker     Years: 30     Types: Cigarettes     Passive exposure: Current    Smokeless tobacco: Never    Tobacco comments:     less than 1 ppw   Vaping Use    Vaping Use: Never used   Substance and Sexual Activity    Alcohol use: Yes     Alcohol/week: 0.0 standard drinks of alcohol     Comment: occ    Drug use: No    Sexual activity: Not Currently     Partners: Male   Other Topics Concern    Parent/sibling w/ CABG, MI or angioplasty before 65F 55M? No   Social History Narrative    Not on file     Social Determinants of Health     Financial Resource Strain: Not on file   Food Insecurity: Not on file   Transportation Needs: Not on file   Physical Activity: Not on file   Stress: Not on file   Social Connections: Not on file   Interpersonal Safety: Not on  "file   Housing Stability: Not on file          Family History:     Family History   Problem Relation Age of Onset    Diabetes Mother     Gastrointestinal Disease Father         colon polyps    Diabetes Maternal Grandmother             Allergies:     Allergies   Allergen Reactions    Septra [Sulfamethoxazole-Trimethoprim] Other (See Comments)     Stomatitis with mouth ulcerations    Influenza Vaccine Live Other (See Comments)     History of Guillain-Palmyra            Medications:     Current Outpatient Medications   Medication Sig Dispense Refill    ascorbic acid (VITAMIN C) 500 MG tablet Take 1 tablet by mouth daily. 100 tablet 12    Calcium Citrate-Vitamin D (CALCIUM + D PO) Take 1 tablet by mouth daily      cholecalciferol (VITAMIN D) 1000 UNIT tablet Take 1 tablet by mouth daily.      cyanocobalamin (VITAMIN B-12) 100 MCG tablet Take 100 mcg by mouth daily      Ferrous Sulfate (IRON SUPPLEMENT PO) Take 325 mg by mouth three times a week      FISH OIL 1000 MG OR CAPS 1 daily      folic acid (FOLVITE) 1 MG tablet Take 1 tablet (1 mg) by mouth daily 90 tablet 3    MAGNESIUM PO Take 1 tablet by mouth daily      methotrexate 2.5 MG tablet Take 8 tablets (20 mg) by mouth every 7 days Labs every 8 - 12 weeks for refills. 96 tablet 0    omeprazole (PRILOSEC) 40 MG DR capsule TAKE 1 CAPSULE BY MOUTH ONCE DAILY 30-60 MINUTES PRIOR TO A MEAL 90 capsule 1    POTASSIUM PO Take 1 tablet by mouth daily      Turmeric 500 MG CAPS Take by mouth daily      vitamin E 400 UNIT capsule Take 1 capsule by mouth daily 100 capsule 12            Physical Exam:   Blood pressure 131/74, pulse 94, resp. rate 16, height 1.619 m (5' 3.75\"), weight 99.3 kg (219 lb), last menstrual period 05/20/2007, SpO2 98%, not currently breastfeeding.  Wt Readings from Last 6 Encounters:   03/19/24 99.3 kg (219 lb)   12/19/23 96.6 kg (213 lb)   09/19/23 99.1 kg (218 lb 7.2 oz)   07/12/23 99 kg (218 lb 3.2 oz)   06/06/23 100.2 kg (221 lb)   04/26/23 104.3 kg " (230 lb)     Constitutional: well-developed, appearing stated age; cooperative  Eyes: nl conjunctiva, sclera  ENT: nl external ears, nose, hearing, lips,   Neck: no mass or thyroid enlargement  Resp: No shortness of breath with normal conversation  Psych: nl judgement, orientation, memory, affect.           Data:   Imagin2023 x-ray bilateral hand  IMPRESSION:   Severe arthrosis first CMC joints bilaterally. Mild-to-moderate  scattered arthrosis throughout the IP joints of the thumbs and  fingers. Mild scattered joint space narrowing MCP joints.     Small erosions are seen at the left index and middle finger MCP joint  with soft tissue swelling, a finding which can be seen with rheumatoid  arthritis. Soft tissue swelling elsewhere involving MCP joints on both  sides. No acute fracture on either side.    CERVICAL SPINE 2/3 VIEWS 2023                                                             IMPRESSION: There is normal alignment of the cervical vertebrae;  however, there is straightening of normal cervical lordosis. Vertebral  body heights of the cervical spine are normal. Craniocervical  alignment is normal. There is no evidence for fracture of the cervical  spine. Loss of disc space height and degenerative endplate spurring at  C3-C4, C4-C5, C5-C6 and C6-C7. Mild to moderate facet arthropathy  throughout the cervical spine.    Laboratory:  2020  Creatinine 0.63, GFR greater than 90  CRP 77.4  CCP antibody 108  Rheumatoid factor 26  Sed rate 40    2022  CRP 6.5  Rheumatoid factor 84  CBC within normal limits  Sed rate 18    2023  Creatinine 0.85, GFR 75  Albumin 4.1  ALT 16, AST 19  CRP 21.32  White blood cell count 7.4, hemoglobin 13.6, platelet count 207  Sed rate 33    2023  Creatinine 0.74, GFR 89  Albumin 4.3  ALT 19, AST 23  CRP 5.63  White blood cell count 6.8, hemoglobin 14.4, platelet count 200  Sed rate 19    2023  Creatinine 0.78, GFR 83  Albumin 4.1  ALT 19, AST  23  CRP 7.00  WBC 7.5, Hgb 13.2, lt 207  Sed Rate 24

## 2024-03-19 ENCOUNTER — OFFICE VISIT (OUTPATIENT)
Dept: RHEUMATOLOGY | Facility: CLINIC | Age: 69
End: 2024-03-19
Payer: COMMERCIAL

## 2024-03-19 VITALS
DIASTOLIC BLOOD PRESSURE: 74 MMHG | SYSTOLIC BLOOD PRESSURE: 131 MMHG | BODY MASS INDEX: 37.39 KG/M2 | WEIGHT: 219 LBS | OXYGEN SATURATION: 98 % | RESPIRATION RATE: 16 BRPM | HEIGHT: 64 IN | HEART RATE: 94 BPM

## 2024-03-19 DIAGNOSIS — Z79.899 HIGH RISK MEDICATION USE: ICD-10-CM

## 2024-03-19 DIAGNOSIS — M05.741 RHEUMATOID ARTHRITIS INVOLVING BOTH HANDS WITH POSITIVE RHEUMATOID FACTOR (H): ICD-10-CM

## 2024-03-19 DIAGNOSIS — M05.742 RHEUMATOID ARTHRITIS INVOLVING BOTH HANDS WITH POSITIVE RHEUMATOID FACTOR (H): ICD-10-CM

## 2024-03-19 DIAGNOSIS — R76.8 CYCLIC CITRULLINATED PEPTIDE (CCP) ANTIBODY POSITIVE: ICD-10-CM

## 2024-03-19 LAB
ALBUMIN SERPL BCG-MCNC: 4.1 G/DL (ref 3.5–5.2)
ALT SERPL W P-5'-P-CCNC: 20 U/L (ref 0–50)
AST SERPL W P-5'-P-CCNC: 22 U/L (ref 0–45)
CREAT SERPL-MCNC: 0.96 MG/DL (ref 0.51–0.95)
CRP SERPL-MCNC: 6.59 MG/L
EGFRCR SERPLBLD CKD-EPI 2021: 65 ML/MIN/1.73M2
ERYTHROCYTE [DISTWIDTH] IN BLOOD BY AUTOMATED COUNT: 13.1 % (ref 10–15)
ERYTHROCYTE [SEDIMENTATION RATE] IN BLOOD BY WESTERGREN METHOD: 16 MM/HR (ref 0–30)
HCT VFR BLD AUTO: 42.6 % (ref 35–47)
HGB BLD-MCNC: 13.9 G/DL (ref 11.7–15.7)
MCH RBC QN AUTO: 30.9 PG (ref 26.5–33)
MCHC RBC AUTO-ENTMCNC: 32.6 G/DL (ref 31.5–36.5)
MCV RBC AUTO: 95 FL (ref 78–100)
PLATELET # BLD AUTO: 191 10E3/UL (ref 150–450)
RBC # BLD AUTO: 4.5 10E6/UL (ref 3.8–5.2)
WBC # BLD AUTO: 7.9 10E3/UL (ref 4–11)

## 2024-03-19 PROCEDURE — 84460 ALANINE AMINO (ALT) (SGPT): CPT | Performed by: PHYSICIAN ASSISTANT

## 2024-03-19 PROCEDURE — 82040 ASSAY OF SERUM ALBUMIN: CPT | Performed by: PHYSICIAN ASSISTANT

## 2024-03-19 PROCEDURE — 36415 COLL VENOUS BLD VENIPUNCTURE: CPT | Performed by: PHYSICIAN ASSISTANT

## 2024-03-19 PROCEDURE — 99214 OFFICE O/P EST MOD 30 MIN: CPT | Performed by: PHYSICIAN ASSISTANT

## 2024-03-19 PROCEDURE — 82565 ASSAY OF CREATININE: CPT | Performed by: PHYSICIAN ASSISTANT

## 2024-03-19 PROCEDURE — 86140 C-REACTIVE PROTEIN: CPT | Performed by: PHYSICIAN ASSISTANT

## 2024-03-19 PROCEDURE — 85652 RBC SED RATE AUTOMATED: CPT | Performed by: PHYSICIAN ASSISTANT

## 2024-03-19 PROCEDURE — G2211 COMPLEX E/M VISIT ADD ON: HCPCS | Performed by: PHYSICIAN ASSISTANT

## 2024-03-19 PROCEDURE — 84450 TRANSFERASE (AST) (SGOT): CPT | Performed by: PHYSICIAN ASSISTANT

## 2024-03-19 PROCEDURE — 85027 COMPLETE CBC AUTOMATED: CPT | Performed by: PHYSICIAN ASSISTANT

## 2024-03-19 RX ORDER — METHOTREXATE 2.5 MG/1
20 TABLET ORAL
Qty: 96 TABLET | Refills: 0 | Status: SHIPPED | OUTPATIENT
Start: 2024-03-19 | End: 2024-06-12

## 2024-03-19 ASSESSMENT — PAIN SCALES - GENERAL: PAINLEVEL: MODERATE PAIN (5)

## 2024-03-19 NOTE — PATIENT INSTRUCTIONS
After Visit Instructions:     Thank you for coming to [facility] for your care. It is my goal to partner with you to help you reach your optimal state of health.     Plan:     Schedule follow-up with Clarissa Griffin PA-C in 3-4 months.   Labs: CBC, Creatinine, Albumin, AST, ALT, CRP and Sed Rate today and every 3 months  Medication recommendations:   Methotrexate 2.5mg: Continue 20mg (8 tablets) WEEKLY. While on Methotrexate:  -check labs (ALT/AST, albumin, CBC with platelets and creatinine) every 3 months  -Limit alcohol to 2 drinks weekly  -Take Folic Acid 3mg daily   -Tylenol 500-1000mg can be used as needed up to three times daily for nausea/headache associated with dosing      Clarissa Griffin PA-C  [facility]  [facility]    Contact information: [facility]  Clinic Number:  [phone]  Please call or send a Sensitive Object message with any questions about your care

## 2024-03-21 ENCOUNTER — OFFICE VISIT (OUTPATIENT)
Dept: FAMILY MEDICINE | Facility: CLINIC | Age: 69
End: 2024-03-21
Payer: COMMERCIAL

## 2024-03-21 VITALS
DIASTOLIC BLOOD PRESSURE: 72 MMHG | BODY MASS INDEX: 37.97 KG/M2 | HEART RATE: 87 BPM | RESPIRATION RATE: 20 BRPM | WEIGHT: 222.4 LBS | TEMPERATURE: 97.4 F | SYSTOLIC BLOOD PRESSURE: 122 MMHG | OXYGEN SATURATION: 99 % | HEIGHT: 64 IN

## 2024-03-21 DIAGNOSIS — N76.2 VULVAR CELLULITIS: Primary | ICD-10-CM

## 2024-03-21 LAB
ALBUMIN UR-MCNC: NEGATIVE MG/DL
APPEARANCE UR: CLEAR
BILIRUB UR QL STRIP: NEGATIVE
COLOR UR AUTO: YELLOW
GLUCOSE UR STRIP-MCNC: NEGATIVE MG/DL
HGB UR QL STRIP: NEGATIVE
KETONES UR STRIP-MCNC: NEGATIVE MG/DL
LEUKOCYTE ESTERASE UR QL STRIP: NEGATIVE
NITRATE UR QL: NEGATIVE
PH UR STRIP: 6 [PH] (ref 5–7)
SP GR UR STRIP: 1.02 (ref 1–1.03)
UROBILINOGEN UR STRIP-ACNC: 0.2 E.U./DL

## 2024-03-21 PROCEDURE — 81003 URINALYSIS AUTO W/O SCOPE: CPT | Performed by: NURSE PRACTITIONER

## 2024-03-21 PROCEDURE — 99213 OFFICE O/P EST LOW 20 MIN: CPT | Performed by: NURSE PRACTITIONER

## 2024-03-21 RX ORDER — DOXYCYCLINE HYCLATE 100 MG
100 TABLET ORAL 2 TIMES DAILY
Qty: 14 TABLET | Refills: 0 | Status: SHIPPED | OUTPATIENT
Start: 2024-03-21 | End: 2024-03-28

## 2024-03-21 RX ORDER — ONDANSETRON 4 MG/1
4 TABLET, ORALLY DISINTEGRATING ORAL EVERY 8 HOURS PRN
Qty: 5 TABLET | Refills: 0 | Status: SHIPPED | OUTPATIENT
Start: 2024-03-21 | End: 2024-06-12

## 2024-03-21 ASSESSMENT — PAIN SCALES - GENERAL: PAINLEVEL: NO PAIN (0)

## 2024-03-21 NOTE — PROGRESS NOTES
"  Assessment & Plan     Vulvar cellulitis  Given spontaneously draining abscess with associated cellulitis, will cover with doxycycline and Augmentin.  Patient prescribed small quantity of Zofran in the event of nausea with medications.  Discussed signs symptoms of worsening illness, reasons to return for recheck.  - doxycycline hyclate (VIBRA-TABS) 100 MG tablet; Take 1 tablet (100 mg) by mouth 2 times daily for 7 days  - amoxicillin-clavulanate (AUGMENTIN) 875-125 MG tablet; Take 1 tablet by mouth 2 times daily for 7 days  - ondansetron (ZOFRAN ODT) 4 MG ODT tab; Take 1 tablet (4 mg) by mouth every 8 hours as needed for nausea      Nicotine/Tobacco Cessation  She reports that she has been smoking cigarettes. She has been exposed to tobacco smoke. She has never used smokeless tobacco.  Nicotine/Tobacco Cessation Plan        BMI  Estimated body mass index is 38.47 kg/m  as calculated from the following:    Height as of this encounter: 1.619 m (5' 3.75\").    Weight as of this encounter: 100.9 kg (222 lb 6.4 oz).         See Patient Instructions    Erik Shaw is a 68 year old, presenting for the following health issues:  Urinary Problem        3/21/2024     6:57 AM   Additional Questions   Roomed by Jerry HOWE CMA   Accompanied by self     History of Present Illness       Reason for visit:  Bladder infection  Symptom onset:  3-7 days ago  Symptoms include:  Burning while urinating  Symptom intensity:  Moderate  Symptom progression:  Worsening  Had these symptoms before:  Yes  Has tried/received treatment for these symptoms:  Yes  Previous treatment was successful:  Yes  Prior treatment description:  Medication  What makes it worse:  No  What makes it better:  No    She eats 2-3 servings of fruits and vegetables daily.She consumes 0 sweetened beverage(s) daily.She exercises with enough effort to increase her heart rate 9 or less minutes per day.  She exercises with enough effort to increase her heart rate 3 or " "less days per week.   She is taking medications regularly.       Above HPI reviewed. Additionally, 3 days of dysuria, does have a sense of discomfort all the time.  No fevers, no back pain, no flank pain, no abdominal pain.  No hematuria.  No increase in urinary frequency.  Of note, patient reports that she had a \"boil\" that she popped several days ago to the left labia.  There has been some drainage from the area.  She used hot packs to open this up.  She has had similar lesions in the past, but not in many years.  No vaginal bleeding.        Review of Systems  Constitutional, HEENT, cardiovascular, pulmonary, gi and gu systems are negative, except as otherwise noted.      Objective    /72 (BP Location: Right arm, Patient Position: Sitting, Cuff Size: Adult Large)   Pulse 87   Temp 97.4  F (36.3  C) (Tympanic)   Resp 20   Ht 1.619 m (5' 3.75\")   Wt 100.9 kg (222 lb 6.4 oz)   LMP 05/20/2007 (LMP Unknown)   SpO2 99%   BMI 38.47 kg/m    Body mass index is 38.47 kg/m .  Physical Exam  Vitals and nursing note reviewed.   Constitutional:       General: She is not in acute distress.     Appearance: Normal appearance.   HENT:      Head: Normocephalic and atraumatic.      Mouth/Throat:      Mouth: Mucous membranes are moist.   Cardiovascular:      Rate and Rhythm: Normal rate.   Pulmonary:      Effort: Pulmonary effort is normal.   Abdominal:      Tenderness: There is no right CVA tenderness or left CVA tenderness.   Genitourinary:     Comments: There is erythema noted to the bilateral vulvar area.  There is a spontaneously draining lesion to the left labia with minimal discharge. No surrounding induration.  Musculoskeletal:      Cervical back: Neck supple.   Skin:     General: Skin is warm and dry.   Neurological:      General: No focal deficit present.      Mental Status: She is alert.   Psychiatric:         Mood and Affect: Mood normal.         Behavior: Behavior normal.            Results for orders placed " or performed in visit on 03/21/24 (from the past 24 hour(s))   UA Macroscopic with reflex to Microscopic and Culture - Clinic Collect    Specimen: Urine, Clean Catch   Result Value Ref Range    Color Urine Yellow Colorless, Straw, Light Yellow, Yellow    Appearance Urine Clear Clear    Glucose Urine Negative Negative mg/dL    Bilirubin Urine Negative Negative    Ketones Urine Negative Negative mg/dL    Specific Gravity Urine 1.025 1.003 - 1.035    Blood Urine Negative Negative    pH Urine 6.0 5.0 - 7.0    Protein Albumin Urine Negative Negative mg/dL    Urobilinogen Urine 0.2 0.2, 1.0 E.U./dL    Nitrite Urine Negative Negative    Leukocyte Esterase Urine Negative Negative    Narrative    Microscopic not indicated           Signed Electronically by: ANNALEE Shine CNP

## 2024-03-21 NOTE — PATIENT INSTRUCTIONS
Start both antibiotics twice daily for one week.   Can use Zofran for nausea if needed.  If not improving in 2-3 days, return for recheck.  If fevers, worsening symptoms, go to ER.

## 2024-04-16 DIAGNOSIS — K21.00 GASTROESOPHAGEAL REFLUX DISEASE WITH ESOPHAGITIS, UNSPECIFIED WHETHER HEMORRHAGE: Chronic | ICD-10-CM

## 2024-04-16 RX ORDER — OMEPRAZOLE 40 MG/1
CAPSULE, DELAYED RELEASE ORAL
Qty: 90 CAPSULE | Refills: 0 | Status: SHIPPED | OUTPATIENT
Start: 2024-04-16 | End: 2024-08-13

## 2024-06-11 NOTE — PROGRESS NOTES
Rheumatology Clinic Visit  [facility]  WILI Marcos     Mirian Cruz MRN# [id number]   YOB: 1955 Age: 69 year old   Date of Visit: 6/12/2024  Primary care provider: [facility]          Assessment and Plan:     Rheumatoid arthritis  CCP antibody positive  High-risk medication use    Patient presents today for follow-up regarding her rheumatoid arthritis.  She states that she has been feeling quite well.  She feels that the methotrexate is working well.  She does think that the nodules on her hands have gotten larger.  Physical examination today does show rheumatoid nodules over the bilateral second MCPs.  She has no tenderness associated with palpation to her MCPs or PIPs.  Previous laboratory evaluations were reviewed, results below.  At this time patient feels that her arthritis is well-controlled.  Discussed with the patient that methotrexate can sometimes make nodules worse.  We may want to consider switching treatment if she feels that the nodules are getting larger or she is getting more of them.  1 option may be to consider looking at sulfasalazine.  She did not tolerate hydroxychloroquine.  Could also consider biologic therapy.  At this time as she feels she is overall stable we will continue on the methotrexate.  Will continue to monitor labs every 3 months to monitor for any medication toxicity.  She will have these checked today and then again in 3 months.  She will follow-up with me in 6 months, sooner if needed.     The longitudinal plan of care for the diagnosis(es)/condition(s) as documented were addressed during this visit. Due to the added complexity in care, I will continue to support Valeria in the subsequent management and with ongoing continuity of care.      Plan:   Schedule follow-up with Clarissa Griffin PA-C in 6 months.   Labs: CBC, Creatinine, Albumin, AST, ALT, CRP and Sed Rate today and every 3 months  Medication  recommendations:   Methotrexate 2.5mg: Continue 20mg (8 tablets) WEEKLY. While on Methotrexate:  -check labs (ALT/AST, albumin, CBC with platelets and creatinine) every 3 months  -Limit alcohol to 2 drinks weekly  -Take Folic Acid 3mg daily   -Tylenol 500-1000mg can be used as needed up to three times daily for nausea/headache associated with dosing  Could consider switching you to Sulfasalazine if the nodules become more bothersome. You would Take 1 tablet daily for 1 week, then take 1 tablet twice daily for 1 week, then take 1 tab in AM and 2 tabs in PM for 1 week, then take 2 tabs twice daily. Take this medication with food.  # Sulfasalazine Risks and Benefits: The risks and benefits of sulfasalazine were discussed in detail and the patient verbalized understanding.  The risks discussed include, but are not limited to, the risk for hypersensitivity, anaphylaxis, anaphylactoid reactions, infections, bone marrow suppression,  hepatotoxicity, nausea, vomiting, and GI upset.  Oligospermia may occur in males.  I encouraged reviewing the package insert and asking any questions about the medication.      Clarissa Griffin, [facility]  Rheumatology         History of Present Illness:   Mirian Cruz presents for evaluation of rheumatoid arthritis.      Rheumatological history:  Previous medications tried: Hydroxychloroquine (stomach upset)  Current medications: Methotrexate 8 tabs weekly       Interval history June 12, 2024:  Doing well. Feels that joints are well controlled.     Interval history March 19, 2024  She has had some numbness/tingling in her left hand. She is wondering if it is her carpal tunnel. She was going to get it fixed but as it was doing good, but as she is more symptomatic she is looking into it. She stopped the Hydroxychloroquine as it upset her stomach even with a lower dose. She has occasional shoulder pain, but feels this is weather related. She has not had any troubles in the mornings.     Interval  history December 19, 2023:  She is still having some stiffness in her hands. She states that mornings have been pretty good. She had some stiffness in her shoulders but she felt this was related to the cold weather. Some days her hands are more sore than others. The increase dose of methotrexate has not made a difference in her hands.     Interval history September 19, 2023:  She states that she has been doing good. She states that she is still unable to make a fist. Her left ring does stiffen up at times. She states that her shoulders and neck are doing much better. No mouth sores or GI upset. She takes her folic acid daily and methotrexate on Wednesdays. She has not needed any Tylenol or Ibuprofen for 2 weeks.     HPI from consult of July 12, 2023:  She reports having increased pain in her hands. She has a nodule on her hand. She has pain in the back of her neck, shoulders and hips. She has some  bumps on her heels. She has stiffness in the mornings. This lasts a couple of hours until her tylenol and ibuprofen kicks in. She has not been seeing rheumatology. She saw someone over 13 years ago. Her hands have been bothering her for many years. The neck and shoulders have been bothering her for the last 6 months. The shoulder pain started out in the left shoulder and then moved to the right shoulder. Hip pain is intermittent. She has occasional trouble raising her arms above her head. No headaches, double vision or jaw pain. No fevers. No skin rashes. She does report decreased energy. 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  No shortness of breath.     Prednisone helps, but makes her heart race and makes her feel jittery and caused her to have trouble sleeping.          Review of Systems:     Constitutional: negative  Skin: negative  Eyes: negative  Ears/Nose/Throat: negative  Respiratory: No shortness of breath, dyspnea on exertion, cough, or hemoptysis  Cardiovascular:  negative  Gastrointestinal: negative  Genitourinary: negative  Musculoskeletal: as above  Neurologic: negative  Psychiatric: negative  Hematologic/Lymphatic/Immunologic: negative  Endocrine: negative         Active Problem List:     Patient Active Problem List    Diagnosis Date Noted    Rheumatoid arthritis involving both hands with positive rheumatoid factor (H) 07/20/2022     Priority: Medium     Not on DMARDs      Status post total left knee replacement 04/05/2017     Priority: Medium    Left knee DJD 04/05/2017     Priority: Medium    Prediabetes 04/13/2016     Priority: Medium    Status post total right knee replacement 02/18/2015     Priority: Medium    OA (osteoarthritis) of knee 02/10/2015     Priority: Medium    Urinary, incontinence, stress female 05/07/2013     Priority: Medium    Urethral hypermobility 05/07/2013     Priority: Medium    Advanced directives, counseling/discussion 01/10/2013     Priority: Medium     Patient does not have an Advance/Health Care Directive (HCD), declines information/referral.    Mai Muse  January 10, 2013        Sleep related leg cramps 01/10/2013     Priority: Medium     Improve with gabapentin      Hyperlipidemia LDL goal <130 10/31/2010     Priority: Medium    GERD (gastroesophageal reflux disease) 10/27/2009     Priority: Medium    Morbid obesity (H) 10/27/2009     Priority: Medium    Tobacco use disorder 08/20/2008     Priority: Medium     Very rare use of cigarette, does use some e-cigarettes.  4/24/16 -- patient stopped all nicotine a few weeks ago              Past Medical History:   No past medical history on file.  Past Surgical History:   Procedure Laterality Date    ARTHROPLASTY KNEE Right 2/18/2015    Procedure: ARTHROPLASTY KNEE;  Surgeon: Zelalem Mendez MD;  Location: [facility]    ARTHROPLASTY KNEE Left 4/5/2017    Procedure: ARTHROPLASTY KNEE;  Surgeon: Zelalem Mendez MD;  Location: [facility]    [facility] [place]  [facility]     COMBINED BUNIONECTOMY CHEVRON AND REUBEN performed by TRISTON SEWELL at [facility]    CYSTOSCOPY  5/20/2013    Procedure: CYSTOSCOPY;;  Surgeon: Adan Morrison MD;  Location: [facility]    REMOVE HARDWARE FOOT  2/15/2012    Procedure:REMOVE HARDWARE FOOT; Removal of hardware left foot; Surgeon:TRISTON SEWELL; Location:[facility]    SLING TRANSVAGINAL  5/20/2013    Procedure: SLING TRANSVAGINAL;  Transvaginal taping, cystoscopy;  Surgeon: Adan Morrison MD;  Location: [facility]    TUBAL LIGATION              Social History:     Social History     Socioeconomic History    Marital status:      Spouse name: Not on file    Number of children: 4    Years of education: Not on file    Highest education level: Not on file   Occupational History     Employer: [employer]   Tobacco Use    Smoking status: Light Smoker     Types: Cigarettes     Passive exposure: Current    Smokeless tobacco: Never    Tobacco comments:     less than 1 ppw   Vaping Use    Vaping status: Never Used   Substance and Sexual Activity    Alcohol use: Yes     Alcohol/week: 0.0 standard drinks of alcohol     Comment: occ    Drug use: No    Sexual activity: Not Currently     Partners: Male   Other Topics Concern    Parent/sibling w/ CABG, MI or angioplasty before 65F 55M? No   Social History Narrative    Not on file     Social Determinants of Health     Financial Resource Strain: Not on file   Food Insecurity: Not on file   Transportation Needs: Not on file   Physical Activity: Not on file   Stress: Not on file   Social Connections: Unknown (7/28/2022)    [facility]    Social Connections     Frequency of Communication with Friends and Family: Not on file   Interpersonal Safety: Low Risk  (3/21/2024)    Interpersonal Safety     Do you feel physically and emotionally safe where you currently live?: Yes     Within the past 12 months, have you been hit, slapped, kicked or otherwise physically  "hurt by someone?: No     Within the past 12 months, have you been humiliated or emotionally abused in other ways by your partner or ex-partner?: No   Housing Stability: Not on file          Family History:     Family History   Problem Relation Age of Onset    Diabetes Mother     Gastrointestinal Disease Father         colon polyps    Diabetes Maternal Grandmother             Allergies:     Allergies   Allergen Reactions    Septra [Sulfamethoxazole-Trimethoprim] Other (See Comments)     Stomatitis with mouth ulcerations    Influenza Vaccine Live Other (See Comments)     History of Gu[place]ain[place]            Medications:     Current Outpatient Medications   Medication Sig Dispense Refill    ascorbic acid (VITAMIN C) 500 MG tablet Take 1 tablet by mouth daily. 100 tablet 12    Calcium Citrate-Vitamin D (CALCIUM + D PO) Take 1 tablet by mouth daily      cholecalciferol (VITAMIN D) 1000 UNIT tablet Take 1 tablet by mouth daily.      cyanocobalamin (VITAMIN B-12) 100 MCG tablet Take 100 mcg by mouth daily      Ferrous Sulfate (IRON SUPPLEMENT PO) Take 325 mg by mouth three times a week      FISH OIL 1000 MG OR CAPS 1 daily      folic acid (FOLVITE) 1 MG tablet Take 1 tablet (1 mg) by mouth daily 90 tablet 3    MAGNESIUM PO Take 1 tablet by mouth daily      methotrexate 2.5 MG tablet Take 8 tablets (20 mg) by mouth every 7 days Labs every 8 - 12 weeks for refills. 96 tablet 0    omeprazole (PRILOSEC) 40 MG DR capsule TAKE 1 CAPSULE BY MOUTH ONCE DAILY 30-60 MINUTES PRIOR TO A MEAL 90 capsule 0    POTASSIUM PO Take 1 tablet by mouth daily      Turmeric 500 MG CAPS Take by mouth daily      vitamin E 400 UNIT capsule Take 1 capsule by mouth daily 100 capsule 12            Physical Exam:   Blood pressure (!) 144/90, pulse 86, resp. rate 18, height 1.619 m (5' 3.75\"), weight 101.8 kg (224 lb 6.4 oz), last menstrual period 05/20/2007, SpO2 99%, not currently breastfeeding.  Wt Readings from Last 6 Encounters:   03/21/24 100.9 kg " (222 lb 6.4 oz)   24 99.3 kg (219 lb)   23 96.6 kg (213 lb)   23 99.1 kg (218 lb 7.2 oz)   23 99 kg (218 lb 3.2 oz)   23 100.2 kg (221 lb)     Constitutional: well-developed, appearing stated age; cooperative  Eyes: nl conjunctiva, sclera  ENT: nl external ears, nose, hearing, lips,   Neck: no mass or thyroid enlargement  Resp: No shortness of breath with normal conversation  MSK: Nodules over the second MCPs bilaterally.  No tenderness to palpation over her MCPs or PIPs.  Psych: nl judgement, orientation, memory, affect.           Data:   Imagin2023 x-ray bilateral hand  IMPRESSION:   Severe arthrosis first CMC joints bilaterally. Mild-to-moderate  scattered arthrosis throughout the IP joints of the thumbs and  fingers. Mild scattered joint space narrowing MCP joints.     Small erosions are seen at the left index and middle finger MCP joint  with soft tissue swelling, a finding which can be seen with rheumatoid  arthritis. Soft tissue swelling elsewhere involving MCP joints on both  sides. No acute fracture on either side.    CERVICAL SPINE 2/3 VIEWS 2023                                                             IMPRESSION: There is normal alignment of the cervical vertebrae;  however, there is straightening of normal cervical lordosis. Vertebral  body heights of the cervical spine are normal. Craniocervical  alignment is normal. There is no evidence for fracture of the cervical  spine. Loss of disc space height and degenerative endplate spurring at  C3-C4, C4-C5, C5-C6 and C6-C7. Mild to moderate facet arthropathy  throughout the cervical spine.    Laboratory:  2020  Creatinine 0.63, GFR greater than 90  CRP 77.4  CCP antibody 108  Rheumatoid factor 26  Sed rate 40    2022  CRP 6.5  Rheumatoid factor 84  CBC within normal limits  Sed rate 18    2023  Creatinine 0.85, GFR 75  Albumin 4.1  ALT 16, AST 19  CRP 21.32  White blood cell count 7.4, hemoglobin  13.6, platelet count 207  Sed rate 33    9/19/2023  Creatinine 0.74, GFR 89  Albumin 4.3  ALT 19, AST 23  CRP 5.63  White blood cell count 6.8, hemoglobin 14.4, platelet count 200  Sed rate 19    12/19/2023  Creatinine 0.78, GFR 83  Albumin 4.1  ALT 19, AST 23  CRP 7.00  WBC 7.5, Hgb 13.2, lt 207  Sed Rate 24    3/19/2024  Creatinine 0.96, GFR 65  Albumin 4.1  ALT 20, AST 22  CRP 6.59  White blood cell count 7.9, hemoglobin 13.9, platelet count 191  Sed rate 16

## 2024-06-12 ENCOUNTER — OFFICE VISIT (OUTPATIENT)
Dept: RHEUMATOLOGY | Facility: CLINIC | Age: 69
End: 2024-06-12
Payer: COMMERCIAL

## 2024-06-12 VITALS
SYSTOLIC BLOOD PRESSURE: 144 MMHG | DIASTOLIC BLOOD PRESSURE: 90 MMHG | BODY MASS INDEX: 38.31 KG/M2 | OXYGEN SATURATION: 99 % | WEIGHT: 224.4 LBS | RESPIRATION RATE: 18 BRPM | HEIGHT: 64 IN | HEART RATE: 86 BPM

## 2024-06-12 DIAGNOSIS — M05.741 RHEUMATOID ARTHRITIS INVOLVING BOTH HANDS WITH POSITIVE RHEUMATOID FACTOR (H): ICD-10-CM

## 2024-06-12 DIAGNOSIS — Z79.899 HIGH RISK MEDICATION USE: ICD-10-CM

## 2024-06-12 DIAGNOSIS — R76.8 CYCLIC CITRULLINATED PEPTIDE (CCP) ANTIBODY POSITIVE: ICD-10-CM

## 2024-06-12 DIAGNOSIS — M05.742 RHEUMATOID ARTHRITIS INVOLVING BOTH HANDS WITH POSITIVE RHEUMATOID FACTOR (H): ICD-10-CM

## 2024-06-12 LAB
ALBUMIN SERPL BCG-MCNC: 4.3 G/DL (ref 3.5–5.2)
ALT SERPL W P-5'-P-CCNC: 19 U/L (ref 0–50)
AST SERPL W P-5'-P-CCNC: 23 U/L (ref 0–45)
CREAT SERPL-MCNC: 0.83 MG/DL (ref 0.51–0.95)
CRP SERPL-MCNC: 6.5 MG/L
EGFRCR SERPLBLD CKD-EPI 2021: 77 ML/MIN/1.73M2
ERYTHROCYTE [DISTWIDTH] IN BLOOD BY AUTOMATED COUNT: 12.5 % (ref 10–15)
ERYTHROCYTE [SEDIMENTATION RATE] IN BLOOD BY WESTERGREN METHOD: 16 MM/HR (ref 0–30)
HCT VFR BLD AUTO: 41.4 % (ref 35–47)
HGB BLD-MCNC: 13.8 G/DL (ref 11.7–15.7)
MCH RBC QN AUTO: 31.5 PG (ref 26.5–33)
MCHC RBC AUTO-ENTMCNC: 33.3 G/DL (ref 31.5–36.5)
MCV RBC AUTO: 95 FL (ref 78–100)
PLATELET # BLD AUTO: 196 10E3/UL (ref 150–450)
RBC # BLD AUTO: 4.38 10E6/UL (ref 3.8–5.2)
WBC # BLD AUTO: 9.2 10E3/UL (ref 4–11)

## 2024-06-12 PROCEDURE — 82040 ASSAY OF SERUM ALBUMIN: CPT | Performed by: PHYSICIAN ASSISTANT

## 2024-06-12 PROCEDURE — G2211 COMPLEX E/M VISIT ADD ON: HCPCS | Performed by: PHYSICIAN ASSISTANT

## 2024-06-12 PROCEDURE — 85652 RBC SED RATE AUTOMATED: CPT | Performed by: PHYSICIAN ASSISTANT

## 2024-06-12 PROCEDURE — 84460 ALANINE AMINO (ALT) (SGPT): CPT | Performed by: PHYSICIAN ASSISTANT

## 2024-06-12 PROCEDURE — 84450 TRANSFERASE (AST) (SGOT): CPT | Performed by: PHYSICIAN ASSISTANT

## 2024-06-12 PROCEDURE — 86140 C-REACTIVE PROTEIN: CPT | Performed by: PHYSICIAN ASSISTANT

## 2024-06-12 PROCEDURE — 85027 COMPLETE CBC AUTOMATED: CPT | Performed by: PHYSICIAN ASSISTANT

## 2024-06-12 PROCEDURE — 36415 COLL VENOUS BLD VENIPUNCTURE: CPT | Performed by: PHYSICIAN ASSISTANT

## 2024-06-12 PROCEDURE — 82565 ASSAY OF CREATININE: CPT | Performed by: PHYSICIAN ASSISTANT

## 2024-06-12 PROCEDURE — 99214 OFFICE O/P EST MOD 30 MIN: CPT | Performed by: PHYSICIAN ASSISTANT

## 2024-06-12 RX ORDER — METHOTREXATE 2.5 MG/1
20 TABLET ORAL
Qty: 96 TABLET | Refills: 0 | Status: SHIPPED | OUTPATIENT
Start: 2024-06-12 | End: 2024-09-03

## 2024-06-12 ASSESSMENT — PAIN SCALES - GENERAL: PAINLEVEL: MILD PAIN (3)

## 2024-06-12 NOTE — PATIENT INSTRUCTIONS
After Visit Instructions:     Thank you for coming to [facility] for your care. It is my goal to partner with you to help you reach your optimal state of health.     Plan:     Schedule follow-up with Clarissa Griffin PA-C in 6 months.   Labs: CBC, Creatinine, Albumin, AST, ALT, CRP and Sed Rate today and every 3 months  Medication recommendations:   Methotrexate 2.5mg: Continue 20mg (8 tablets) WEEKLY. While on Methotrexate:  -check labs (ALT/AST, albumin, CBC with platelets and creatinine) every 3 months  -Limit alcohol to 2 drinks weekly  -Take Folic Acid 3mg daily   -Tylenol 500-1000mg can be used as needed up to three times daily for nausea/headache associated with dosing  Could consider switching you to Sulfasalazine if the nodules become more bothersome. You would Take 1 tablet daily for 1 week, then take 1 tablet twice daily for 1 week, then take 1 tab in AM and 2 tabs in PM for 1 week, then take 2 tabs twice daily. Take this medication with food.  # Sulfasalazine Risks and Benefits: The risks and benefits of sulfasalazine were discussed in detail and the patient verbalized understanding.  The risks discussed include, but are not limited to, the risk for hypersensitivity, anaphylaxis, anaphylactoid reactions, infections, bone marrow suppression,  hepatotoxicity, nausea, vomiting, and GI upset.  Oligospermia may occur in males.  I encouraged reviewing the package insert and asking any questions about the medication.          Clarissa Griffin PA-C  [facility]  [facility]    Contact information: [facility]  Clinic Number:  [phone]  Please call or send a [facility] message with any questions about your care

## 2024-06-25 ENCOUNTER — MYC MEDICAL ADVICE (OUTPATIENT)
Dept: RHEUMATOLOGY | Facility: CLINIC | Age: 69
End: 2024-06-25
Payer: COMMERCIAL

## 2024-07-03 ENCOUNTER — HOSPITAL ENCOUNTER (OUTPATIENT)
Dept: MAMMOGRAPHY | Facility: CLINIC | Age: 69
Discharge: HOME | End: 2024-07-03
Attending: FAMILY MEDICINE | Admitting: FAMILY MEDICINE
Payer: COMMERCIAL

## 2024-07-03 DIAGNOSIS — Z12.31 VISIT FOR SCREENING MAMMOGRAM: ICD-10-CM

## 2024-07-03 PROCEDURE — 77063 BREAST TOMOSYNTHESIS BI: CPT

## 2024-08-12 DIAGNOSIS — K21.00 GASTROESOPHAGEAL REFLUX DISEASE WITH ESOPHAGITIS, UNSPECIFIED WHETHER HEMORRHAGE: Chronic | ICD-10-CM

## 2024-08-13 RX ORDER — OMEPRAZOLE 40 MG/1
CAPSULE, DELAYED RELEASE ORAL
Qty: 90 CAPSULE | Refills: 0 | Status: SHIPPED | OUTPATIENT
Start: 2024-08-13

## 2024-08-25 ENCOUNTER — HEALTH MAINTENANCE LETTER (OUTPATIENT)
Age: 69
End: 2024-08-25

## 2024-09-03 ENCOUNTER — MYC REFILL (OUTPATIENT)
Dept: RHEUMATOLOGY | Facility: CLINIC | Age: 69
End: 2024-09-03
Payer: COMMERCIAL

## 2024-09-03 DIAGNOSIS — M05.742 RHEUMATOID ARTHRITIS INVOLVING BOTH HANDS WITH POSITIVE RHEUMATOID FACTOR (H): ICD-10-CM

## 2024-09-03 DIAGNOSIS — Z79.899 HIGH RISK MEDICATION USE: ICD-10-CM

## 2024-09-03 DIAGNOSIS — M05.741 RHEUMATOID ARTHRITIS INVOLVING BOTH HANDS WITH POSITIVE RHEUMATOID FACTOR (H): ICD-10-CM

## 2024-09-03 DIAGNOSIS — R76.8 CYCLIC CITRULLINATED PEPTIDE (CCP) ANTIBODY POSITIVE: ICD-10-CM

## 2024-09-03 RX ORDER — METHOTREXATE 2.5 MG/1
20 TABLET ORAL
Qty: 96 TABLET | Refills: 0 | Status: SHIPPED | OUTPATIENT
Start: 2024-09-03

## 2024-09-03 NOTE — TELEPHONE ENCOUNTER
Last Office/video Visit: 06/10/2024  Next office Visit Due: 12/2024  Next Scheduled: n/a     Last Eye exam: n/a  Next Eye exam due: n/a     Labs Date: 06/12/2024  Labs Due: 9/12/2024     Patient meet criteria, refill sent to patients' pharmacy of choice.    [employer] message sent to the patient to prompt her to set up her labs for this month.

## 2024-11-20 NOTE — PROGRESS NOTES
Rheumatology Clinic Visit  [facility]  WILI Marcos     Mirian Cruz MRN# [id number]   YOB: 1955 Age: 69 year old   Date of Visit: 11/26/2024  Primary care provider: [facility]          Assessment and Plan:     Rheumatoid arthritis  CCP antibody positive  High-risk medication use    Patient presents today for follow-up regarding her rheumatoid arthritis.  Overall she feels that her joints are stable.  She does occasionally get some pain in her shoulders.  She does continue to have symptoms of her left hand.  She has decreased fist formation and some synovitis over the second MCP.  No significant tenderness to palpation.  At this time we will get an x-ray of that left hand as she would prefer to not make any treatment changes at this time.  If it is stable okay to continue on her current regimen.  If there are any changes compared to the x-ray in 2023, would recommend she consider adding sulfasalazine.  She is not sure that she would be good about taking the 2 pills twice a day so we could always keep it at a lower dose at 2 pills once a day.  Will check monitoring labs today.  I will contact the patient with the results of the evaluation once it is complete.        The longitudinal plan of care for the diagnosis(es)/condition(s) as documented were addressed during this visit. Due to the added complexity in care, I will continue to support Valeria in the subsequent management and with ongoing continuity of care.      Plan:   Schedule follow-up with Clarissa Griffin PA-C in 6 months.   Labs: CBC, Creatinine, Albumin, AST, ALT, CRP and Sed Rate today and every 3 months  Imaging: xray left hand  Medication recommendations:   Methotrexate 2.5mg: Continue 20mg (8 tablets) WEEKLY. While on Methotrexate:  -check labs (ALT/AST, albumin, CBC with platelets and creatinine) every 3 months  -Limit alcohol to 2 drinks weekly  -Take Folic Acid 3mg daily   -Tylenol 500-1000mg  can be used as needed up to three times daily for nausea/headache associated with dosing  Could consider switching you to Sulfasalazine if the nodules become more bothersome. You would Take 1 tablet daily for 1 week, then take 1 tablet twice daily for 1 week, then take 1 tab in AM and 2 tabs in PM for 1 week, then take 2 tabs twice daily. Take this medication with food.  # Sulfasalazine Risks and Benefits: The risks and benefits of sulfasalazine were discussed in detail and the patient verbalized understanding.  The risks discussed include, but are not limited to, the risk for hypersensitivity, anaphylaxis, anaphylactoid reactions, infections, bone marrow suppression,  hepatotoxicity, nausea, vomiting, and GI upset.  Oligospermia may occur in males.  I encouraged reviewing the package insert and asking any questions about the medication.      Clarissa Griffin, [facility]  Rheumatology         History of Present Illness:   Mirian HARDIN Anthony presents for evaluation of rheumatoid arthritis.      Rheumatological history:  Previous medications tried: Hydroxychloroquine (stomach upset)  Current medications: Methotrexate 8 tabs weekly     Interval history November 26, 2024:  She continues to feel that Methotrexate helps. Occasionally she will have 3 days where her shoulders will bother her. She uses Tylenol and salon pas. She still has issues with her left hand. She cannot make a fist.     Interval history June 12, 2024:  Doing well. Feels that joints are well controlled.     Interval history March 19, 2024  She has had some numbness/tingling in her left hand. She is wondering if it is her carpal tunnel. She was going to get it fixed but as it was doing good, but as she is more symptomatic she is looking into it. She stopped the Hydroxychloroquine as it upset her stomach even with a lower dose. She has occasional shoulder pain, but feels this is weather related. She has not had any troubles in the mornings.     Interval history  December 19, 2023:  She is still having some stiffness in her hands. She states that mornings have been pretty good. She had some stiffness in her shoulders but she felt this was related to the cold weather. Some days her hands are more sore than others. The increase dose of methotrexate has not made a difference in her hands.     Interval history September 19, 2023:  She states that she has been doing good. She states that she is still unable to make a fist. Her left ring does stiffen up at times. She states that her shoulders and neck are doing much better. No mouth sores or GI upset. She takes her folic acid daily and methotrexate on Wednesdays. She has not needed any Tylenol or Ibuprofen for 2 weeks.     HPI from consult of July 12, 2023:  She reports having increased pain in her hands. She has a nodule on her hand. She has pain in the back of her neck, shoulders and hips. She has some  bumps on her heels. She has stiffness in the mornings. This lasts a couple of hours until her tylenol and ibuprofen kicks in. She has not been seeing rheumatology. She saw someone over 13 years ago. Her hands have been bothering her for many years. The neck and shoulders have been bothering her for the last 6 months. The shoulder pain started out in the left shoulder and then moved to the right shoulder. Hip pain is intermittent. She has occasional trouble raising her arms above her head. No headaches, double vision or jaw pain. No fevers. No skin rashes. She does report decreased energy. 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  No shortness of breath.     Prednisone helps, but makes her heart race and makes her feel jittery and caused her to have trouble sleeping.          Review of Systems:     Constitutional: negative  Skin: negative  Eyes: negative  Ears/Nose/Throat: negative  Respiratory: No shortness of breath, dyspnea on exertion, cough, or hemoptysis  Cardiovascular:  negative  Gastrointestinal: negative  Genitourinary: negative  Musculoskeletal: as above  Neurologic: negative  Psychiatric: negative  Hematologic/Lymphatic/Immunologic: negative  Endocrine: negative         Active Problem List:     Patient Active Problem List    Diagnosis Date Noted    Rheumatoid arthritis involving both hands with positive rheumatoid factor (H) 07/20/2022     Priority: Medium     Not on DMARDs      Status post total left knee replacement 04/05/2017     Priority: Medium    Left knee DJD 04/05/2017     Priority: Medium    Prediabetes 04/13/2016     Priority: Medium    Status post total right knee replacement 02/18/2015     Priority: Medium    OA (osteoarthritis) of knee 02/10/2015     Priority: Medium    Urinary, incontinence, stress female 05/07/2013     Priority: Medium    Urethral hypermobility 05/07/2013     Priority: Medium    Sleep related leg cramps 01/10/2013     Priority: Medium     Improve with gabapentin      Hyperlipidemia LDL goal <130 10/31/2010     Priority: Medium    GERD (gastroesophageal reflux disease) 10/27/2009     Priority: Medium    Morbid obesity (H) 10/27/2009     Priority: Medium    Tobacco use disorder 08/20/2008     Priority: Medium     Very rare use of cigarette, does use some e-cigarettes.  4/24/16 -- patient stopped all nicotine a few weeks ago              Past Medical History:   No past medical history on file.  Past Surgical History:   Procedure Laterality Date    ARTHROPLASTY KNEE Right 2/18/2015    Procedure: ARTHROPLASTY KNEE;  Surgeon: Zelalem Mendez MD;  Location: [facility]    ARTHROPLASTY KNEE Left 4/5/2017    Procedure: ARTHROPLASTY KNEE;  Surgeon: Zelalem Mendez MD;  Location: [facility]    BUNIONECTOMY VALENTINE AND REUBEN, COMBINED  3/25/2011    COMBINED BUNIONECTOMY CHEVRON AND REUBEN performed by TRISTON SEWELL at [facility]    CYSTOSCOPY  5/20/2013    Procedure: CYSTOSCOPY;;  Surgeon: Adan Morrison MD;  Location: [facility]    REMOVE HARDWARE FOOT   2/15/2012    Procedure:REMOVE HARDWARE FOOT; Removal of hardware left foot; Surgeon:TRISTON SEWELL; Location:[facility]    SLING TRANSVAGINAL  5/20/2013    Procedure: SLING TRANSVAGINAL;  Transvaginal taping, cystoscopy;  Surgeon: Adan Morrison MD;  Location: [facility]    TUBAL LIGATION              Social History:     Social History     Socioeconomic History    Marital status:      Spouse name: Not on file    Number of children: 4    Years of education: Not on file    Highest education level: Not on file   Occupational History     Employer: [employer]   Tobacco Use    Smoking status: Light Smoker     Current packs/day: 0.50     Average packs/day: 0.5 packs/day for 43.9 years (21.9 ttl pk-yrs)     Types: Cigarettes     Start date: 1981     Passive exposure: Current    Smokeless tobacco: Never    Tobacco comments:     less than 1 ppw.  Off and on since her 20s.   Vaping Use    Vaping status: Never Used   Substance and Sexual Activity    Alcohol use: Yes     Alcohol/week: 0.0 standard drinks of alcohol     Comment: occ    Drug use: No    Sexual activity: Not Currently     Partners: Male   Other Topics Concern    Parent/sibling w/ CABG, MI or angioplasty before 65F 55M? No   Social History Narrative    Not on file     Social Drivers of Health     Financial Resource Strain: Low Risk  (6/30/2024)    Financial Resource Strain     Within the past 12 months, have you or your family members you live with been unable to get utilities (heat, electricity) when it was really needed?: No   Food Insecurity: Low Risk  (6/30/2024)    Food Insecurity     Within the past 12 months, did you worry that your food would run out before you got money to buy more?: No     Within the past 12 months, did the food you bought just not last and you didn t have money to get more?: No   Transportation Needs: Low Risk  (6/30/2024)    Transportation Needs     Within the past 12 months, has lack of transportation kept you from medical  appointments, getting your medicines, non-medical meetings or appointments, work, or from getting things that you need?: No   Physical Activity: Sufficiently Active (6/30/2024)    Exercise Vital Sign     Days of Exercise per Week: 4 days     Minutes of Exercise per Session: 60 min   Stress: No Stress Concern Present (6/30/2024)    [place] [place] [employer] - Occupational Stress Questionnaire     Feeling of Stress : Not at all   Social Connections: Unknown (6/30/2024)    Social Connection and Isolation Panel [NHANES]     Frequency of Communication with Friends and Family: Not on file     Frequency of Social Gatherings with Friends and Family: Three times a week     Attends [employer] Services: Not on file     Active Member of Clubs or Organizations: Not on file     Attends Club or Organization Meetings: Not on file     Marital Status: Not on file   Interpersonal Safety: Low Risk  (3/21/2024)    Interpersonal Safety     Do you feel physically and emotionally safe where you currently live?: Yes     Within the past 12 months, have you been hit, slapped, kicked or otherwise physically hurt by someone?: No     Within the past 12 months, have you been humiliated or emotionally abused in other ways by your partner or ex-partner?: No   Housing Stability: Low Risk  (6/30/2024)    Housing Stability     Do you have housing? : Yes     Are you worried about losing your housing?: No          Family History:     Family History   Problem Relation Age of Onset    Diabetes Mother     Gastrointestinal Disease Father         colon polyps    Diabetes Maternal Grandmother             Allergies:     Allergies   Allergen Reactions    Septra [Sulfamethoxazole-Trimethoprim] Other (See Comments)     Stomatitis with mouth ulcerations    Influenza Vaccine Live Other (See Comments)     History of Guillain-Brandon            Medications:     Current Outpatient Medications   Medication Sig Dispense Refill    ascorbic acid (VITAMIN C)  "500 MG tablet Take 1 tablet by mouth daily. 100 tablet 12    Calcium Citrate-Vitamin D (CALCIUM + D PO) Take 1 tablet by mouth daily      cholecalciferol (VITAMIN D) 1000 UNIT tablet Take 1 tablet by mouth daily.      cyanocobalamin (VITAMIN B-12) 100 MCG tablet Take 100 mcg by mouth daily      Ferrous Sulfate (IRON SUPPLEMENT PO) Take 325 mg by mouth three times a week      FISH OIL 1000 MG OR CAPS 1 daily      folic acid (FOLVITE) 1 MG tablet Take 1 tablet (1 mg) by mouth daily 90 tablet 3    MAGNESIUM PO Take 1 tablet by mouth daily      methotrexate 2.5 MG tablet Take 8 tablets (20 mg) by mouth every 7 days. Labs every 8 - 12 weeks for refills. 96 tablet 0    omeprazole (PRILOSEC) 40 MG DR capsule TAKE 1 CAPSULE BY MOUTH ONCE DAILY 30-60 MINUTES PRIOR TO A MEAL 90 capsule 0    POTASSIUM PO Take 1 tablet by mouth daily      Turmeric 500 MG CAPS Take by mouth daily      vitamin E 400 UNIT capsule Take 1 capsule by mouth daily 100 capsule 12            Physical Exam:   Blood pressure 138/69, pulse 90, temperature 97.5  F (36.4  C), temperature source Tympanic, resp. rate 16, height 1.619 m (5' 3.75\"), weight 99.6 kg (219 lb 9.6 oz), last menstrual period 2007, SpO2 100%, not currently breastfeeding.  Wt Readings from Last 6 Encounters:   24 101.8 kg (224 lb 6.4 oz)   24 100.9 kg (222 lb 6.4 oz)   24 99.3 kg (219 lb)   23 96.6 kg (213 lb)   23 99.1 kg (218 lb 7.2 oz)   23 99 kg (218 lb 3.2 oz)     Constitutional: well-developed, appearing stated age; cooperative  Eyes: nl conjunctiva, sclera  ENT: nl external ears, nose, hearing, lips,   Neck: no mass or thyroid enlargement  Resp: No shortness of breath with normal conversation  MSK: Nodules over the second MCPs bilaterally.  No tenderness to palpation over her MCPs or PIPs.  Psych: nl judgement, orientation, memory, affect.           Data:   Imagin2023 x-ray bilateral hand  IMPRESSION:   Severe arthrosis first CMC " joints bilaterally. Mild-to-moderate  scattered arthrosis throughout the IP joints of the thumbs and  fingers. Mild scattered joint space narrowing MCP joints.     Small erosions are seen at the left index and middle finger MCP joint with soft tissue swelling, a finding which can be seen with rheumatoid arthritis. Soft tissue swelling elsewhere involving MCP joints on both sides. No acute fracture on either side.    CERVICAL SPINE 2/3 VIEWS 7/12/2023                                                             IMPRESSION: There is normal alignment of the cervical vertebrae;  however, there is straightening of normal cervical lordosis. Vertebral  body heights of the cervical spine are normal. Craniocervical  alignment is normal. There is no evidence for fracture of the cervical  spine. Loss of disc space height and degenerative endplate spurring at  C3-C4, C4-C5, C5-C6 and C6-C7. Mild to moderate facet arthropathy  throughout the cervical spine.    Laboratory:  5/27/2020  Creatinine 0.63, GFR greater than 90  CRP 77.4  CCP antibody 108  Rheumatoid factor 26  Sed rate 40    7/20/2022  CRP 6.5  Rheumatoid factor 84  CBC within normal limits  Sed rate 18    7/12/2023  Creatinine 0.85, GFR 75  Albumin 4.1  ALT 16, AST 19  CRP 21.32  White blood cell count 7.4, hemoglobin 13.6, platelet count 207  Sed rate 33    9/19/2023  Creatinine 0.74, GFR 89  Albumin 4.3  ALT 19, AST 23  CRP 5.63  White blood cell count 6.8, hemoglobin 14.4, platelet count 200  Sed rate 19    12/19/2023  Creatinine 0.78, GFR 83  Albumin 4.1  ALT 19, AST 23  CRP 7.00  WBC 7.5, Hgb 13.2, lt 207  Sed Rate 24    3/19/2024  Creatinine 0.96, GFR 65  Albumin 4.1  ALT 20, AST 22  CRP 6.59  White blood cell count 7.9, hemoglobin 13.9, platelet count 191  Sed rate 16    6/12/2024  Creatinine 0.83, GFR 77  Albumin 4.3  ALT 19, AST 23  CRP 6.50  White blood cell count 9.2, hemoglobin 13.8, platelet count 196  Sed rate 16

## 2024-11-26 ENCOUNTER — ANCILLARY PROCEDURE (OUTPATIENT)
Dept: GENERAL RADIOLOGY | Facility: CLINIC | Age: 69
End: 2024-11-26
Attending: PHYSICIAN ASSISTANT
Payer: COMMERCIAL

## 2024-11-26 ENCOUNTER — OFFICE VISIT (OUTPATIENT)
Dept: RHEUMATOLOGY | Facility: CLINIC | Age: 69
End: 2024-11-26
Attending: PHYSICIAN ASSISTANT
Payer: COMMERCIAL

## 2024-11-26 VITALS
BODY MASS INDEX: 37.49 KG/M2 | WEIGHT: 219.6 LBS | SYSTOLIC BLOOD PRESSURE: 138 MMHG | OXYGEN SATURATION: 100 % | TEMPERATURE: 97.5 F | HEART RATE: 90 BPM | DIASTOLIC BLOOD PRESSURE: 69 MMHG | HEIGHT: 64 IN | RESPIRATION RATE: 16 BRPM

## 2024-11-26 DIAGNOSIS — M05.742 RHEUMATOID ARTHRITIS INVOLVING BOTH HANDS WITH POSITIVE RHEUMATOID FACTOR (H): ICD-10-CM

## 2024-11-26 DIAGNOSIS — M05.741 RHEUMATOID ARTHRITIS INVOLVING BOTH HANDS WITH POSITIVE RHEUMATOID FACTOR (H): ICD-10-CM

## 2024-11-26 DIAGNOSIS — R76.8 CYCLIC CITRULLINATED PEPTIDE (CCP) ANTIBODY POSITIVE: ICD-10-CM

## 2024-11-26 DIAGNOSIS — Z79.899 HIGH RISK MEDICATION USE: ICD-10-CM

## 2024-11-26 LAB
ALBUMIN SERPL BCG-MCNC: 4.4 G/DL (ref 3.5–5.2)
ALT SERPL W P-5'-P-CCNC: 22 U/L (ref 0–50)
AST SERPL W P-5'-P-CCNC: 23 U/L (ref 0–45)
CREAT SERPL-MCNC: 0.75 MG/DL (ref 0.51–0.95)
CRP SERPL-MCNC: 8.15 MG/L
EGFRCR SERPLBLD CKD-EPI 2021: 87 ML/MIN/1.73M2
ERYTHROCYTE [DISTWIDTH] IN BLOOD BY AUTOMATED COUNT: 12.7 % (ref 10–15)
ERYTHROCYTE [SEDIMENTATION RATE] IN BLOOD BY WESTERGREN METHOD: 19 MM/HR (ref 0–30)
HCT VFR BLD AUTO: 41.9 % (ref 35–47)
HGB BLD-MCNC: 13.8 G/DL (ref 11.7–15.7)
MCH RBC QN AUTO: 31.1 PG (ref 26.5–33)
MCHC RBC AUTO-ENTMCNC: 32.9 G/DL (ref 31.5–36.5)
MCV RBC AUTO: 94 FL (ref 78–100)
PLATELET # BLD AUTO: 190 10E3/UL (ref 150–450)
RBC # BLD AUTO: 4.44 10E6/UL (ref 3.8–5.2)
WBC # BLD AUTO: 6.4 10E3/UL (ref 4–11)

## 2024-11-26 PROCEDURE — 86140 C-REACTIVE PROTEIN: CPT | Performed by: PHYSICIAN ASSISTANT

## 2024-11-26 PROCEDURE — G2211 COMPLEX E/M VISIT ADD ON: HCPCS | Performed by: PHYSICIAN ASSISTANT

## 2024-11-26 PROCEDURE — 99214 OFFICE O/P EST MOD 30 MIN: CPT | Performed by: PHYSICIAN ASSISTANT

## 2024-11-26 PROCEDURE — 84460 ALANINE AMINO (ALT) (SGPT): CPT | Performed by: PHYSICIAN ASSISTANT

## 2024-11-26 PROCEDURE — 85652 RBC SED RATE AUTOMATED: CPT | Performed by: PHYSICIAN ASSISTANT

## 2024-11-26 PROCEDURE — 84450 TRANSFERASE (AST) (SGOT): CPT | Performed by: PHYSICIAN ASSISTANT

## 2024-11-26 PROCEDURE — 82040 ASSAY OF SERUM ALBUMIN: CPT | Performed by: PHYSICIAN ASSISTANT

## 2024-11-26 PROCEDURE — 85027 COMPLETE CBC AUTOMATED: CPT | Performed by: PHYSICIAN ASSISTANT

## 2024-11-26 PROCEDURE — 73130 X-RAY EXAM OF HAND: CPT | Mod: TC | Performed by: STUDENT IN AN ORGANIZED HEALTH CARE EDUCATION/TRAINING PROGRAM

## 2024-11-26 PROCEDURE — 82565 ASSAY OF CREATININE: CPT | Performed by: PHYSICIAN ASSISTANT

## 2024-11-26 PROCEDURE — 36415 COLL VENOUS BLD VENIPUNCTURE: CPT | Performed by: PHYSICIAN ASSISTANT

## 2024-11-26 RX ORDER — METHOTREXATE 2.5 MG/1
20 TABLET ORAL
Qty: 96 TABLET | Refills: 0 | Status: SHIPPED | OUTPATIENT
Start: 2024-11-26

## 2024-11-26 ASSESSMENT — PAIN SCALES - GENERAL: PAINLEVEL_OUTOF10: NO PAIN (0)

## 2024-11-26 NOTE — PATIENT INSTRUCTIONS
After Visit Instructions:     Thank you for coming to [facility] for your care. It is my goal to partner with you to help you reach your optimal state of health.     Plan:     Schedule follow-up with Clarissa Griffin PA-C in 6 months.   Labs: CBC, Creatinine, Albumin, AST, ALT, CRP and Sed Rate today and every 3 months  Imaging: xray left hand  Medication recommendations:   Methotrexate 2.5mg: Continue 20mg (8 tablets) WEEKLY. While on Methotrexate:  -check labs (ALT/AST, albumin, CBC with platelets and creatinine) every 3 months  -Limit alcohol to 2 drinks weekly  -Take Folic Acid 3mg daily   -Tylenol 500-1000mg can be used as needed up to three times daily for nausea/headache associated with dosing  Could consider switching you to Sulfasalazine if the nodules become more bothersome. You would Take 1 tablet daily for 1 week, then take 1 tablet twice daily for 1 week, then take 1 tab in AM and 2 tabs in PM for 1 week, then take 2 tabs twice daily. Take this medication with food.  # Sulfasalazine Risks and Benefits: The risks and benefits of sulfasalazine were discussed in detail and the patient verbalized understanding.  The risks discussed include, but are not limited to, the risk for hypersensitivity, anaphylaxis, anaphylactoid reactions, infections, bone marrow suppression,  hepatotoxicity, nausea, vomiting, and GI upset.  Oligospermia may occur in males.  I encouraged reviewing the package insert and asking any questions about the medication.      Clarissa Griffin PA-C  [facility]  [facility]    Contact information: [facility]  Clinic Number:  [phone]  Please call or send a [employer] message with any questions about your care

## 2024-12-02 SDOH — HEALTH STABILITY: PHYSICAL HEALTH: ON AVERAGE, HOW MANY MINUTES DO YOU ENGAGE IN EXERCISE AT THIS LEVEL?: 20 MIN

## 2024-12-02 SDOH — HEALTH STABILITY: PHYSICAL HEALTH: ON AVERAGE, HOW MANY DAYS PER WEEK DO YOU ENGAGE IN MODERATE TO STRENUOUS EXERCISE (LIKE A BRISK WALK)?: 4 DAYS

## 2024-12-02 ASSESSMENT — SOCIAL DETERMINANTS OF HEALTH (SDOH): HOW OFTEN DO YOU GET TOGETHER WITH FRIENDS OR RELATIVES?: ONCE A WEEK

## 2024-12-03 ENCOUNTER — OFFICE VISIT (OUTPATIENT)
Dept: FAMILY MEDICINE | Facility: CLINIC | Age: 69
End: 2024-12-03
Payer: COMMERCIAL

## 2024-12-03 VITALS
HEART RATE: 101 BPM | WEIGHT: 218 LBS | OXYGEN SATURATION: 99 % | RESPIRATION RATE: 16 BRPM | TEMPERATURE: 97.7 F | BODY MASS INDEX: 37.22 KG/M2 | SYSTOLIC BLOOD PRESSURE: 138 MMHG | HEIGHT: 64 IN | DIASTOLIC BLOOD PRESSURE: 80 MMHG

## 2024-12-03 DIAGNOSIS — Z13.1 SCREENING FOR DIABETES MELLITUS: ICD-10-CM

## 2024-12-03 DIAGNOSIS — N89.8 PRURITUS OF VAGINA: ICD-10-CM

## 2024-12-03 DIAGNOSIS — E78.5 HYPERLIPIDEMIA LDL GOAL <130: ICD-10-CM

## 2024-12-03 DIAGNOSIS — E66.01 MORBID OBESITY (H): ICD-10-CM

## 2024-12-03 DIAGNOSIS — Z00.00 ENCOUNTER FOR MEDICARE ANNUAL WELLNESS EXAM: Primary | ICD-10-CM

## 2024-12-03 DIAGNOSIS — M25.552 BILATERAL HIP PAIN: ICD-10-CM

## 2024-12-03 DIAGNOSIS — M25.551 BILATERAL HIP PAIN: ICD-10-CM

## 2024-12-03 DIAGNOSIS — K21.00 GASTROESOPHAGEAL REFLUX DISEASE WITH ESOPHAGITIS, UNSPECIFIED WHETHER HEMORRHAGE: Chronic | ICD-10-CM

## 2024-12-03 DIAGNOSIS — Z11.59 NEED FOR HEPATITIS C SCREENING TEST: ICD-10-CM

## 2024-12-03 DIAGNOSIS — H93.8X1 EAR FULLNESS, RIGHT: ICD-10-CM

## 2024-12-03 DIAGNOSIS — M05.742 RHEUMATOID ARTHRITIS INVOLVING BOTH HANDS WITH POSITIVE RHEUMATOID FACTOR (H): ICD-10-CM

## 2024-12-03 DIAGNOSIS — Z12.11 SCREEN FOR COLON CANCER: ICD-10-CM

## 2024-12-03 DIAGNOSIS — M05.741 RHEUMATOID ARTHRITIS INVOLVING BOTH HANDS WITH POSITIVE RHEUMATOID FACTOR (H): ICD-10-CM

## 2024-12-03 PROBLEM — Z13.220 SCREENING FOR HYPERLIPIDEMIA: Status: ACTIVE | Noted: 2024-12-03

## 2024-12-03 LAB
ALBUMIN SERPL BCG-MCNC: 4.3 G/DL (ref 3.5–5.2)
ALBUMIN UR-MCNC: NEGATIVE MG/DL
ALP SERPL-CCNC: 84 U/L (ref 40–150)
ALT SERPL W P-5'-P-CCNC: 22 U/L (ref 0–50)
ANION GAP SERPL CALCULATED.3IONS-SCNC: 12 MMOL/L (ref 7–15)
APPEARANCE UR: CLEAR
AST SERPL W P-5'-P-CCNC: 26 U/L (ref 0–45)
BILIRUB SERPL-MCNC: 0.6 MG/DL
BILIRUB UR QL STRIP: NEGATIVE
BUN SERPL-MCNC: 13.6 MG/DL (ref 8–23)
CALCIUM SERPL-MCNC: 10.1 MG/DL (ref 8.8–10.4)
CHLORIDE SERPL-SCNC: 102 MMOL/L (ref 98–107)
CHOLEST SERPL-MCNC: 236 MG/DL
COLOR UR AUTO: YELLOW
CREAT SERPL-MCNC: 0.79 MG/DL (ref 0.51–0.95)
EGFRCR SERPLBLD CKD-EPI 2021: 82 ML/MIN/1.73M2
FASTING STATUS PATIENT QL REPORTED: NO
FASTING STATUS PATIENT QL REPORTED: NO
GLUCOSE SERPL-MCNC: 106 MG/DL (ref 70–99)
GLUCOSE UR STRIP-MCNC: NEGATIVE MG/DL
HCO3 SERPL-SCNC: 27 MMOL/L (ref 22–29)
HDLC SERPL-MCNC: 61 MG/DL
HGB UR QL STRIP: NEGATIVE
KETONES UR STRIP-MCNC: NEGATIVE MG/DL
LDLC SERPL CALC-MCNC: 152 MG/DL
LEUKOCYTE ESTERASE UR QL STRIP: NEGATIVE
NITRATE UR QL: NEGATIVE
NONHDLC SERPL-MCNC: 175 MG/DL
PH UR STRIP: 7 [PH] (ref 5–7)
POTASSIUM SERPL-SCNC: 4.3 MMOL/L (ref 3.4–5.3)
PROT SERPL-MCNC: 7.4 G/DL (ref 6.4–8.3)
SODIUM SERPL-SCNC: 141 MMOL/L (ref 135–145)
SP GR UR STRIP: 1.01 (ref 1–1.03)
TRIGL SERPL-MCNC: 116 MG/DL
UROBILINOGEN UR STRIP-ACNC: 0.2 E.U./DL

## 2024-12-03 PROCEDURE — 36415 COLL VENOUS BLD VENIPUNCTURE: CPT

## 2024-12-03 PROCEDURE — 80053 COMPREHEN METABOLIC PANEL: CPT

## 2024-12-03 PROCEDURE — 81003 URINALYSIS AUTO W/O SCOPE: CPT

## 2024-12-03 PROCEDURE — 80061 LIPID PANEL: CPT

## 2024-12-03 RX ORDER — OMEPRAZOLE 40 MG/1
40 CAPSULE, DELAYED RELEASE ORAL DAILY
Qty: 90 CAPSULE | Refills: 3 | Status: SHIPPED | OUTPATIENT
Start: 2024-12-03

## 2024-12-03 ASSESSMENT — PAIN SCALES - GENERAL: PAINLEVEL_OUTOF10: NO PAIN (0)

## 2024-12-03 NOTE — PROGRESS NOTES
Preventive Care Visit  [facility]  ANNALEE Woodward CNP, Nurse Practitioner Primary Care  Dec 3, 2024      Assessment & Plan     Encounter for Medicare annual wellness exam  Patient is in overall general good health. Patient reports she has vaginal itching without discharge, odor, or burning during urination.     - REVIEW OF HEALTH MAINTENANCE PROTOCOL ORDERS    Pruritus of vagina  Patient reports she has vaginal itching without discharge, odor, or burning during urination.     - UA Macroscopic with reflex to Microscopic and Culture - Clinic Collect    Gastroesophageal reflux disease with esophagitis, unspecified whether hemorrhage  Patient reports Omeprazole works well to manage reflux symptoms and she denied any unpleasant symptoms. Refilled medication.     - omeprazole (PRILOSEC) 40 MG DR capsule; Take 1 capsule (40 mg) by mouth daily.    Hyperlipidemia LDL goal <130  Patient is not currently on a statin and lipids have not been checked recently.     - Lipid panel reflex to direct LDL Non-fasting; Future  - Lipid panel reflex to direct LDL Non-fasting    Ear fullness, right  Patient reports ear fullness on right side of head and jaw line. Patient denies headache, pain, congestion and seasonal allergies. Exam was unremarkable. Patient did not want to pursue at this time.     Bilateral hip pain  Patient reports bilateral hip pain after walking two blocks in her neighborhood. Patient reports she does not have this same issue if she is walking at a store and pushing a cart. Patient has a history of rheumatoid arthritis. Provided patient with written and verbal education regarding hip arthritis exercises.     - Physical Therapy  Referral; Future    Screen for colon cancer    - Colonoscopy Screening  Referral; Future    Need for hepatitis C screening test    - Hepatitis C Screen Reflex to HCV RNA Quant and Genotype; Future  - Hepatitis C Screen Reflex to HCV RNA  "Quant and Genotype    Screening for diabetes mellitus    - Comprehensive metabolic panel (BMP + Alb, Alk Phos, ALT, AST, Total. Bili, TP); Future  - Comprehensive metabolic panel (BMP + Alb, Alk Phos, ALT, AST, Total. Bili, TP)    Patient has been advised of split billing requirements and indicates understanding: Yes        BMI  Estimated body mass index is 37.13 kg/m  as calculated from the following:    Height as of this encounter: 1.632 m (5' 4.25\").    Weight as of this encounter: 98.9 kg (218 lb).   Weight management plan: Discussed healthy diet and exercise guidelines    Counseling  Appropriate preventive services were addressed with this patient via screening, questionnaire, or discussion as appropriate for fall prevention, nutrition, physical activity, Tobacco-use cessation, social engagement, weight loss and cognition.  Checklist reviewing preventive services available has been given to the patient.  Reviewed patient's diet, addressing concerns and/or questions.   The patient was instructed to see the dentist every 6 months.   Addressed any concerns about safety while driving.  Information on urinary incontinence and treatment options given to patient.       CONSULTATION/REFERRAL to Physical therapy.     Erik Shaw is a 69 year old, presenting for the following:  Wellness Visit and Urinary Problem (Itching - Patient thinks she may have a UTI )        12/3/2024    12:55 PM   Additional Questions   Roomed by Betty GUTIERREZ  Patient is in overall general good health. Patient reports she has vaginal itching without discharge, odor, or burning during urination. Patient reports she has vaginal itching without discharge, odor, or burning during urination. Patient reports Omeprazole works well to manage reflux symptoms and she denied any unpleasant symptoms. Patient is not currently on a statin and lipids have not been checked recently. Patient reports ear fullness on right side of head and jaw line. " Patient denies headache, pain, congestion and seasonal allergies. Exam was unremarkable. Patient did not want to pursue at this time. Patient reports bilateral hip pain after walking two blocks in her neighborhood. Patient reports she does not have this same issue if she is walking at a store and pushing a cart. Patient has a history of rheumatoid arthritis. Provided patient with written and verbal education regarding hip arthritis exercises. Patient denies headache, pain, and nausea.     Genitourinary - Female  Onset/Duration: 2 mo.  Description:   Painful urination (Dysuria): No           Frequency: YES  Blood in urine (Hematuria): No  Delay in urine (Hesitency): No  Intensity: moderate  Progression of Symptoms:  same  Accompanying Signs & Symptoms:  Fever/chills: No  Flank pain: No  Nausea and vomiting: No  Vaginal symptoms: itching  Abdominal/Pelvic Pain: No  History:   History of frequent UTI s: No  History of kidney stones: No  Sexually Active: No  Possibility of pregnancy: No  Precipitating or alleviating factors: None  Therapies tried and outcome:  none    Health Care Directive  Patient does not have a Health Care Directive: Discussed advance care planning with patient; information given to patient to review.      12/2/2024   General Health   How would you rate your overall physical health? (!) FAIR   Feel stress (tense, anxious, or unable to sleep) Not at all            12/2/2024   Nutrition   Diet: Regular (no restrictions)            12/2/2024   Exercise   Days per week of moderate/strenous exercise 4 days   Average minutes spent exercising at this level 20 min            12/2/2024   Social Factors   Frequency of gathering with friends or relatives Once a week   Worry food won't last until get money to buy more No   Food not last or not have enough money for food? No   Do you have housing? (Housing is defined as stable permanent housing and does not include staying ouside in a car, in a tent, in an  abandoned building, in an overnight shelter, or couch-surfing.) Yes   Are you worried about losing your housing? No   Lack of transportation? No   Unable to get utilities (heat,electricity)? No            12/2/2024   Fall Risk   Fallen 2 or more times in the past year? No    Trouble with walking or balance? No        Patient-reported          12/2/2024   Activities of Daily Living- Home Safety   Needs help with the following daily activites None of the above   Safety concerns in the home None of the above            12/2/2024   Dental   Dentist two times every year? (!) NO            12/2/2024   Hearing Screening   Hearing concerns? None of the above            12/2/2024   Driving Risk Screening   Patient/family members have concerns about driving (!) YES Patient reports she has high anxiety about driving longer distances.             12/2/2024   General Alertness/Fatigue Screening   Have you been more tired than usual lately? No            12/2/2024   Urinary Incontinence Screening   Bothered by leaking urine in past 6 months Yes            6/30/2024   TB Screening   Were you born outside of the [place]? No            Today's PHQ-2 Score:       12/2/2024     2:49 PM   PHQ-2 ( 1999 [employer])   Q1: Little interest or pleasure in doing things 1    Q2: Feeling down, depressed or hopeless 0    PHQ-2 Score 1    Q1: Little interest or pleasure in doing things Several days   Q2: Feeling down, depressed or hopeless Not at all   PHQ-2 Score 1       Patient-reported           12/2/2024   Substance Use   Alcohol more than 3/day or more than 7/wk No   Do you have a current opioid prescription? No   How severe/bad is pain from 1 to 10? 3/10   Do you use any other substances recreationally? No        Social History     Tobacco Use    Smoking status: Light Smoker     Current packs/day: 0.50     Average packs/day: 0.5 packs/day for 43.9 years (22.0 ttl pk-yrs)     Types: Cigarettes     Start date: 1981     Passive exposure: Current     Smokeless tobacco: Never    Tobacco comments:     3 cigarettes/ day 24   Vaping Use    Vaping status: Never Used   Substance Use Topics    Alcohol use: Yes     Alcohol/week: 0.0 standard drinks of alcohol     Comment: occ    Drug use: No           7/3/2024   LAST FHS-7 RESULTS   1st degree relative breast or ovarian cancer No   Any relative bilateral breast cancer No   Any male have breast cancer No   Any ONE woman have BOTH breast AND ovarian cancer No   Any woman with breast cancer before 50yrs No   2 or more relatives with breast AND/OR ovarian cancer No   2 or more relatives with breast AND/OR bowel cancer No           Mammogram Screening - Mammogram every 1-2 years updated in Health Maintenance based on mutual decision making      History of abnormal Pap smear: No - age 65 or older with adequate negative prior screening test results (3 consecutive negative cytology results, 2 consecutive negative cotesting results, or 2 consecutive negative HrHPV test results within 10 years, with the most recent test occurring within the recommended screening interval for the test used)        Latest Ref Rng & Units 2016     2:50 PM 2016    12:00 AM 2013    12:00 AM   PAP / HPV   PAP (Historical)   NIL  NIL    HPV 16 DNA NEG Negative      HPV 18 DNA NEG Negative      Other HR HPV NEG Negative        ASCVD Risk   The ASCVD Risk score (Gregoria MITCHELL, et al., 2019) failed to calculate for the following reasons:    Cannot find a previous HDL lab    Cannot find a previous total cholesterol lab    Fracture Risk Assessment Tool  Link to Frax Calculator  Use the information below to complete the Frax calculator  : 1955  Sex: female  Weight (kg): 98.9 kg (actual weight)  Height (cm): 163.2 cm  Previous Fragility Fracture:  No  History of parent with fractured hip:  No  Current Smoking:  Yes  Patient has been on glucocorticoids for more than 3 months (5mg/day or more): No  Rheumatoid Arthritis on Problem  "List:  Yes  Secondary Osteoporosis on Problem List:  No  Consumes 3 or more units of alcohol per day: No  Femoral Neck BMD (g/cm2)            Reviewed and updated as needed this visit by Provider                    Lab work is in process  Current providers sharing in care for this patient include:  Patient Care Team:  Clinic - [facility] as PCP - Shalom Cotton MD as Assigned PCP  Clarissa Griffin PA-C as Assigned Rheumatology Provider    The following health maintenance items are reviewed in Epic and correct as of today:  Health Maintenance   Topic Date Due    HEPATITIS C SCREENING  Never done    ZOSTER IMMUNIZATION (1 of 2) Never done    LUNG CANCER SCREENING  Never done    LIPID  04/12/2017    RSV VACCINE (1 - Risk 60-74 years 1-dose series) Never done    Pneumococcal Vaccine: 65+ Years (2 of 2 - PCV) 04/06/2018    DTAP/TDAP/TD IMMUNIZATION (2 - Td or Tdap) 07/14/2019    MEDICARE ANNUAL WELLNESS VISIT  05/05/2022    GLUCOSE  05/27/2023    ANNUAL REVIEW OF HM ORDERS  07/20/2023    COLORECTAL CANCER SCREENING  07/23/2023    INFLUENZA VACCINE (1) Never done    COVID-19 Vaccine (4 - 2024-25 season) 09/01/2024    NICOTINE/TOBACCO CESSATION COUNSELING Q 1 YR  03/21/2025    DEXA  06/23/2025    FALL RISK ASSESSMENT  12/03/2025    MAMMO SCREENING  07/03/2026    ADVANCE CARE PLANNING  09/22/2027    PHQ-2 (once per calendar year)  Completed    HPV IMMUNIZATION  Aged Out    MENINGITIS IMMUNIZATION  Aged Out    RSV MONOCLONAL ANTIBODY  Aged Out    PAP  Discontinued         Review of Systems  Constitutional, HEENT, cardiovascular, pulmonary, GI, , musculoskeletal, neuro, skin, endocrine and psych systems are negative, except as otherwise noted.     Objective    Exam  /80 (BP Location: Left arm, Patient Position: Chair, Cuff Size: Adult Large)   Pulse 101   Temp 97.7  F (36.5  C) (Tympanic)   Resp 16   Ht 1.632 m (5' 4.25\")   Wt 98.9 kg (218 lb)   LMP 05/20/2007 (LMP Unknown)   " "SpO2 99%   BMI 37.13 kg/m     Estimated body mass index is 37.13 kg/m  as calculated from the following:    Height as of this encounter: 1.632 m (5' 4.25\").    Weight as of this encounter: 98.9 kg (218 lb).    Physical Exam  GENERAL: alert and no distress  EYES: Eyes grossly normal to inspection, PERRL and conjunctivae and sclerae normal  HENT: ear canals and TM's normal, nose and mouth without ulcers or lesions  NECK: no adenopathy, no asymmetry, masses, or scars  RESP: lungs clear to auscultation - no rales, rhonchi or wheezes  CV: regular rate and rhythm, normal S1 S2, no S3 or S4, no murmur, click or rub, no peripheral edema  ABDOMEN: soft, nontender, no hepatosplenomegaly, no masses and bowel sounds normal  MS: no gross musculoskeletal defects noted, no edema  SKIN: no suspicious lesions or rashes  NEURO: Normal strength and tone, mentation intact and speech normal  PSYCH: mentation appears normal, affect normal/bright        12/3/2024   Mini Cog   Clock Draw Score 2 Normal   3 Item Recall 3 objects recalled   Mini Cog Total Score 5                 Signed Electronically by: ANNALEE Woodward CNP    "

## 2024-12-03 NOTE — PATIENT INSTRUCTIONS
Vaccines due:   Shingles *              RSV   Tdap *              Covid   Pneumococcal         Patient Education   Preventive Care Advice   This is general advice given by our system to help you stay healthy. However, your care team may have specific advice just for you. Please talk to your care team about your preventive care needs.  Nutrition  Eat 5 or more servings of fruits and vegetables each day.  Try wheat bread, brown rice and whole grain pasta (instead of white bread, rice, and pasta).  Get enough calcium and vitamin D. Check the label on foods and aim for 100% of the RDA (recommended daily allowance).  Lifestyle  Exercise at least 150 minutes each week  (30 minutes a day, 5 days a week).  Do muscle strengthening activities 2 days a week. These help control your weight and prevent disease.  No smoking.  Wear sunscreen to prevent skin cancer.  Have a dental exam and cleaning every 6 months.  Yearly exams  See your health care team every year to talk about:  Any changes in your health.  Any medicines your care team has prescribed.  Preventive care, family planning, and ways to prevent chronic diseases.  Shots (vaccines)   HPV shots (up to age 26), if you've never had them before.  Hepatitis B shots (up to age 59), if you've never had them before.  COVID-19 shot: Get this shot when it's due.  Flu shot: Get a flu shot every year.  Tetanus shot: Get a tetanus shot every 10 years.  Pneumococcal, hepatitis A, and RSV shots: Ask your care team if you need these based on your risk.  Shingles shot (for age 50 and up)  General health tests  Diabetes screening:  Starting at age 35, Get screened for diabetes at least every 3 years.  If you are younger than age 35, ask your care team if you should be screened for diabetes.  Cholesterol test: At age 39, start having a cholesterol test every 5 years, or more often if advised.  Bone density scan (DEXA): At age 50, ask your care team if you should have this scan for  osteoporosis (brittle bones).  Hepatitis C: Get tested at least once in your life.  STIs (sexually transmitted infections)  Before age 24: Ask your care team if you should be screened for STIs.  After age 24: Get screened for STIs if you're at risk. You are at risk for STIs (including HIV) if:  You are sexually active with more than one person.  You don't use condoms every time.  You or a partner was diagnosed with a sexually transmitted infection.  If you are at risk for HIV, ask about PrEP medicine to prevent HIV.  Get tested for HIV at least once in your life, whether you are at risk for HIV or not.  Cancer screening tests  Cervical cancer screening: If you have a cervix, begin getting regular cervical cancer screening tests starting at age 21.  Breast cancer scan (mammogram): If you've ever had breasts, begin having regular mammograms starting at age 40. This is a scan to check for breast cancer.  Colon cancer screening: It is important to start screening for colon cancer at age 45.  Have a colonoscopy test every 10 years (or more often if you're at risk) Or, ask your provider about stool tests like a FIT test every year or Cologuard test every 3 years.  To learn more about your testing options, visit:   .  For help making a decision, visit:   https://bit.ly/mu96626.  Prostate cancer screening test: If you have a prostate, ask your care team if a prostate cancer screening test (PSA) at age 55 is right for you.  Lung cancer screening: If you are a current or former smoker ages 50 to 80, ask your care team if ongoing lung cancer screenings are right for you.  For informational purposes only. Not to replace the advice of your health care provider. Copyright   2023 [employer]. All rights reserved. Clinically reviewed by the [facility] Transitions Program. [employer] 027029 - REV 01/24.  Bladder Training: Care Instructions  Your Care Instructions     Bladder training is used to treat urge  incontinence and stress incontinence. Urge incontinence means that the need to urinate comes on so fast that you can't get to a toilet in time. Stress incontinence means that you leak urine because of pressure on your bladder. For example, it may happen when you laugh, cough, or lift something heavy.  Bladder training can increase how long you can wait before you have to urinate. It can also help your bladder hold more urine. And it can give you better control over the urge to urinate.  It is important to remember that bladder training takes a few weeks to a few months to make a difference. You may not see results right away, but don't give up.  Follow-up care is a key part of your treatment and safety. Be sure to make and go to all appointments, and call your doctor if you are having problems. It's also a good idea to know your test results and keep a list of the medicines you take.  How can you care for yourself at home?  Work with your doctor to come up with a bladder training program that is right for you. You may use one or more of the following methods.  Delayed urination  In the beginning, try to keep from urinating for 5 minutes after you first feel the need to go.  While you wait, take deep, slow breaths to relax. Kegel exercises can also help you delay the need to go to the bathroom.  After some practice, when you can easily wait 5 minutes to urinate, try to wait 10 minutes before you urinate.  Slowly increase the waiting period until you are able to control when you have to urinate.  Scheduled urination  Empty your bladder when you first wake up in the morning.  Schedule times throughout the day when you will urinate.  Start by going to the bathroom every hour, even if you don't need to go.  Slowly increase the time between trips to the bathroom.  When you have found a schedule that works well for you, keep doing it.  If you wake up during the night and have to urinate, do it. Apply your schedule to  "waking hours only.  Kegel exercises  These tighten and strengthen pelvic muscles, which can help you control the flow of urine. (If doing these exercises causes pain, stop doing them and talk with your doctor.) To do Kegel exercises:  Squeeze your muscles as if you were trying not to pass gas. Or squeeze your muscles as if you were stopping the flow of urine. Your belly, legs, and buttocks shouldn't move.  Hold the squeeze for 3 seconds, then relax for 5 to 10 seconds.  Start with 3 seconds, then add 1 second each week until you are able to squeeze for 10 seconds.  Repeat the exercise 10 times a session. Do 3 to 8 sessions a day.  When should you call for help?  Watch closely for changes in your health, and be sure to contact your doctor if:    Your incontinence is getting worse.     You do not get better as expected.   Where can you learn more?  Go to https://www.[employer].net/patiented  Enter V684 in the search box to learn more about \"Bladder Training: Care Instructions.\"  Current as of: November 15, 2023  Content Version: 14.2 2024 [facility] [employer].   Care instructions adapted under license by your healthcare professional. If you have questions about a medical condition or this instruction, always ask your healthcare professional. [employer] disclaims any warranty or liability for your use of this information.       "

## 2024-12-04 LAB — HCV AB SERPL QL IA: NONREACTIVE

## 2025-02-15 ENCOUNTER — APPOINTMENT (OUTPATIENT)
Dept: GENERAL RADIOLOGY | Facility: CLINIC | Age: 70
End: 2025-02-15
Attending: PHYSICIAN ASSISTANT
Payer: COMMERCIAL

## 2025-02-15 ENCOUNTER — APPOINTMENT (OUTPATIENT)
Dept: CT IMAGING | Facility: CLINIC | Age: 70
End: 2025-02-15
Attending: PHYSICIAN ASSISTANT
Payer: COMMERCIAL

## 2025-02-15 ENCOUNTER — HOSPITAL ENCOUNTER (EMERGENCY)
Facility: CLINIC | Age: 70
Discharge: HOME | End: 2025-02-15
Attending: PHYSICIAN ASSISTANT | Admitting: PHYSICIAN ASSISTANT
Payer: COMMERCIAL

## 2025-02-15 VITALS
SYSTOLIC BLOOD PRESSURE: 144 MMHG | OXYGEN SATURATION: 98 % | DIASTOLIC BLOOD PRESSURE: 75 MMHG | RESPIRATION RATE: 20 BRPM | TEMPERATURE: 98.1 F | HEART RATE: 103 BPM

## 2025-02-15 DIAGNOSIS — J90 PLEURAL EFFUSION, LEFT: ICD-10-CM

## 2025-02-15 DIAGNOSIS — S22.41XA CLOSED FRACTURE OF MULTIPLE RIBS OF RIGHT SIDE, INITIAL ENCOUNTER: ICD-10-CM
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
## 2025-02-15 LAB
ALBUMIN SERPL BCG-MCNC: 4.2 G/DL (ref 3.5–5.2)
ALP SERPL-CCNC: 96 U/L (ref 40–150)
ALT SERPL W P-5'-P-CCNC: 35 U/L (ref 0–50)
ANION GAP SERPL CALCULATED.3IONS-SCNC: 11 MMOL/L (ref 7–15)
AST SERPL W P-5'-P-CCNC: 46 U/L (ref 0–45)
BASOPHILS # BLD AUTO: 0 10E3/UL (ref 0–0.2)
BASOPHILS NFR BLD AUTO: 0 %
BILIRUB SERPL-MCNC: 0.9 MG/DL
BUN SERPL-MCNC: 10.7 MG/DL (ref 8–23)
CALCIUM SERPL-MCNC: 9.3 MG/DL (ref 8.8–10.4)
CHLORIDE SERPL-SCNC: 96 MMOL/L (ref 98–107)
CREAT SERPL-MCNC: 0.59 MG/DL (ref 0.51–0.95)
EGFRCR SERPLBLD CKD-EPI 2021: >90 ML/MIN/1.73M2
EOSINOPHIL # BLD AUTO: 0 10E3/UL (ref 0–0.7)
EOSINOPHIL NFR BLD AUTO: 0 %
ERYTHROCYTE [DISTWIDTH] IN BLOOD BY AUTOMATED COUNT: 12.6 % (ref 10–15)
GLUCOSE SERPL-MCNC: 134 MG/DL (ref 70–99)
HCO3 SERPL-SCNC: 26 MMOL/L (ref 22–29)
HCT VFR BLD AUTO: 41.6 % (ref 35–47)
HGB BLD-MCNC: 14.8 G/DL (ref 11.7–15.7)
IMM GRANULOCYTES # BLD: 0.1 10E3/UL
IMM GRANULOCYTES NFR BLD: 0 %
LYMPHOCYTES # BLD AUTO: 0.8 10E3/UL (ref 0.8–5.3)
LYMPHOCYTES NFR BLD AUTO: 5 %
MCH RBC QN AUTO: 32.2 PG (ref 26.5–33)
MCHC RBC AUTO-ENTMCNC: 35.6 G/DL (ref 31.5–36.5)
MCV RBC AUTO: 90 FL (ref 78–100)
MONOCYTES # BLD AUTO: 0.5 10E3/UL (ref 0–1.3)
MONOCYTES NFR BLD AUTO: 3 %
NEUTROPHILS # BLD AUTO: 13.7 10E3/UL (ref 1.6–8.3)
NEUTROPHILS NFR BLD AUTO: 91 %
NRBC # BLD AUTO: 0 10E3/UL
NRBC BLD AUTO-RTO: 0 /100
PLATELET # BLD AUTO: 213 10E3/UL (ref 150–450)
POTASSIUM SERPL-SCNC: 4.2 MMOL/L (ref 3.4–5.3)
PROT SERPL-MCNC: 7.4 G/DL (ref 6.4–8.3)
RBC # BLD AUTO: 4.6 10E6/UL (ref 3.8–5.2)
SODIUM SERPL-SCNC: 133 MMOL/L (ref 135–145)
WBC # BLD AUTO: 15 10E3/UL (ref 4–11)

## 2025-02-15 PROCEDURE — 250N000009 HC RX 250: Performed by: PHYSICIAN ASSISTANT

## 2025-02-15 PROCEDURE — 82040 ASSAY OF SERUM ALBUMIN: CPT | Performed by: PHYSICIAN ASSISTANT

## 2025-02-15 PROCEDURE — 250N000011 HC RX IP 250 OP 636: Performed by: PHYSICIAN ASSISTANT

## 2025-02-15 PROCEDURE — 36415 COLL VENOUS BLD VENIPUNCTURE: CPT | Performed by: PHYSICIAN ASSISTANT

## 2025-02-15 PROCEDURE — 250N000013 HC RX MED GY IP 250 OP 250 PS 637: Performed by: PHYSICIAN ASSISTANT

## 2025-02-15 PROCEDURE — 71101 X-RAY EXAM UNILAT RIBS/CHEST: CPT | Mod: RT

## 2025-02-15 PROCEDURE — 85025 COMPLETE CBC W/AUTO DIFF WBC: CPT | Performed by: PHYSICIAN ASSISTANT

## 2025-02-15 PROCEDURE — 99285 EMERGENCY DEPT VISIT HI MDM: CPT | Performed by: PHYSICIAN ASSISTANT

## 2025-02-15 PROCEDURE — 96374 THER/PROPH/DIAG INJ IV PUSH: CPT

## 2025-02-15 PROCEDURE — 99285 EMERGENCY DEPT VISIT HI MDM: CPT | Mod: 25

## 2025-02-15 PROCEDURE — 71275 CT ANGIOGRAPHY CHEST: CPT

## 2025-02-15 RX ORDER — IOPAMIDOL 755 MG/ML
72 INJECTION, SOLUTION INTRAVASCULAR ONCE
Status: COMPLETED | OUTPATIENT
Start: 2025-02-15 | End: 2025-02-15

## 2025-02-15 RX ORDER — ACETAMINOPHEN 500 MG
1000 TABLET ORAL ONCE
Status: COMPLETED | OUTPATIENT
Start: 2025-02-15 | End: 2025-02-15

## 2025-02-15 RX ORDER — OXYCODONE AND ACETAMINOPHEN 5; 325 MG/1; MG/1
1 TABLET ORAL EVERY 6 HOURS PRN
Qty: 12 TABLET | Refills: 0 | Status: SHIPPED | OUTPATIENT
Start: 2025-02-15 | End: 2025-02-18

## 2025-02-15 RX ORDER — KETOROLAC TROMETHAMINE 15 MG/ML
10 INJECTION, SOLUTION INTRAMUSCULAR; INTRAVENOUS ONCE
Status: COMPLETED | OUTPATIENT
Start: 2025-02-15 | End: 2025-02-15

## 2025-02-15 RX ADMIN — SODIUM CHLORIDE 100 ML: 9 INJECTION, SOLUTION INTRAVENOUS at 14:25

## 2025-02-15 RX ADMIN — ACETAMINOPHEN 1000 MG: 500 TABLET, FILM COATED ORAL at 12:24

## 2025-02-15 RX ADMIN — KETOROLAC TROMETHAMINE 10 MG: 15 INJECTION, SOLUTION INTRAMUSCULAR; INTRAVENOUS at 13:19

## 2025-02-15 RX ADMIN — IOPAMIDOL 72 ML: 755 INJECTION, SOLUTION INTRAVENOUS at 14:25

## 2025-02-15 ASSESSMENT — ACTIVITIES OF DAILY LIVING (ADL)
ADLS_ACUITY_SCORE: 50

## 2025-02-15 ASSESSMENT — COLUMBIA-SUICIDE SEVERITY RATING SCALE - C-SSRS
2. HAVE YOU ACTUALLY HAD ANY THOUGHTS OF KILLING YOURSELF IN THE PAST MONTH?: NO
1. IN THE PAST MONTH, HAVE YOU WISHED YOU WERE DEAD OR WISHED YOU COULD GO TO SLEEP AND NOT WAKE UP?: NO
6. HAVE YOU EVER DONE ANYTHING, STARTED TO DO ANYTHING, OR PREPARED TO DO ANYTHING TO END YOUR LIFE?: NO

## 2025-02-15 ASSESSMENT — ENCOUNTER SYMPTOMS: CONSTITUTIONAL NEGATIVE: 1

## 2025-02-15 NOTE — ED TRIAGE NOTES
Pt presents in wheelchair in  today, pt states she is able to ambulate. reports loss of balance while taking boots off, falling onto linoleum carla, pain to the right side ribs hurt . Incident happened late last night     Denies blood thinning medication or hitting her head . States has not had any pain reducing medication today.

## 2025-02-15 NOTE — LETTER
February 15, 2025      To Whom It May Concern:      Mirian Cruz was seen in our Emergency Department today, 02/15/25.  Please excuse patient from work for the next week due to a rib injury.  Thank you.      Sincerely,        Silvia Anthony PA-C  Electronically signed

## 2025-02-15 NOTE — ED PROVIDER NOTES
History     Chief Complaint   Patient presents with    Fall    Rib Pain     HPI  Mirian Cruz is a 69 year old female who presents to Urgent Care with complaints of right-sided rib pain since a fall last night.  Patient states she lost her balance while taking off her boots and subsequently fell, landing on the linoleum carla on her right side.  Denies head injury or loss of consciousness.  Patient has had ongoing right-sided rib pain since this morning that is worse with deep breathing and movement.  Patient denies any shortness of breath.  Denies fevers, chills, nausea, vomiting, diarrhea, abdominal pain, chest pain, or extremity injury/swelling/pain.  Patient does not take blood thinners.  No medications taken prior to arrival.      Allergies:  Allergies   Allergen Reactions    Septra [Sulfamethoxazole-Trimethoprim] Other (See Comments)     Stomatitis with mouth ulcerations    Influenza Vaccine Live Other (See Comments)     History of Guillain-[place]       Problem List:    Patient Active Problem List    Diagnosis Date Noted    Encounter for Medicare annual wellness exam 12/03/2024     Priority: Medium    Pruritus of vagina 12/03/2024     Priority: Medium    Bilateral hip pain 12/03/2024     Priority: Medium    Screen for colon cancer 12/03/2024     Priority: Medium    Screening for hyperlipidemia 12/03/2024     Priority: Medium    Screening for diabetes mellitus 12/03/2024     Priority: Medium    Need for hepatitis C screening test 12/03/2024     Priority: Medium    Ear fullness, right 12/03/2024     Priority: Medium    Rheumatoid arthritis involving both hands with positive rheumatoid factor (H) 07/20/2022     Priority: Medium     Not on DMARDs      Status post total left knee replacement 04/05/2017     Priority: Medium    Left knee DJD 04/05/2017     Priority: Medium    Prediabetes 04/13/2016     Priority: Medium    Status post total right knee replacement 02/18/2015     Priority: Medium    OA  (osteoarthritis) of knee 02/10/2015     Priority: Medium    Urinary, incontinence, stress female 05/07/2013     Priority: Medium    Urethral hypermobility 05/07/2013     Priority: Medium    Sleep related leg cramps 01/10/2013     Priority: Medium     Improve with gabapentin      Hyperlipidemia LDL goal <130 10/31/2010     Priority: Medium    GERD (gastroesophageal reflux disease) 10/27/2009     Priority: Medium    Morbid obesity (H) 10/27/2009     Priority: Medium    Tobacco use disorder 08/20/2008     Priority: Medium     Very rare use of cigarette, does use some e-cigarettes.  4/24/16 -- patient stopped all nicotine a few weeks ago          Past Medical History:    No past medical history on file.    Past Surgical History:    Past Surgical History:   Procedure Laterality Date    ARTHROPLASTY KNEE Right 2/18/2015    Procedure: ARTHROPLASTY KNEE;  Surgeon: Zelalem Mendez MD;  Location: [facility]    ARTHROPLASTY KNEE Left 4/5/2017    Procedure: ARTHROPLASTY KNEE;  Surgeon: Zelalem Mendez MD;  Location: [facility]    BUNIONECTOMY CHEROBON AND REUBEN, COMBINED  3/25/2011    COMBINED BUNIONECTOMY CHEROBON AND REUBEN performed by TRISTON SEWELL at [facility]    CYSTOSCOPY  5/20/2013    Procedure: CYSTOSCOPY;;  Surgeon: Adan Morrison MD;  Location: [facility]    REMOVE HARDWARE FOOT  2/15/2012    Procedure:REMOVE HARDWARE FOOT; Removal of hardware left foot; Surgeon:TRISTON SEWELL; Location:[facility]    SLING TRANSVAGINAL  5/20/2013    Procedure: SLING TRANSVAGINAL;  Transvaginal taping, cystoscopy;  Surgeon: Adan Morrison MD;  Location: [facility]    TUBAL LIGATION         Family History:    Family History   Problem Relation Age of Onset    Diabetes Mother     Gastrointestinal Disease Father         colon polyps    Diabetes Maternal Grandmother        Social History:  Marital Status:   [5]  Social History     Tobacco Use    Smoking status: Light Smoker     Current packs/day: 0.50     Average packs/day:  0.5 packs/day for 44.1 years (22.1 ttl pk-yrs)     Types: Cigarettes     Start date: 1981     Passive exposure: Current    Smokeless tobacco: Never    Tobacco comments:     3 cigarettes/ day 11/26/24   Vaping Use    Vaping status: Never Used   Substance Use Topics    Alcohol use: Yes     Alcohol/week: 0.0 standard drinks of alcohol     Comment: occ    Drug use: No        Medications:    methotrexate 2.5 MG tablet  omeprazole (PRILOSEC) 40 MG DR capsule  oxyCODONE-acetaminophen (PERCOCET) 5-325 MG tablet  ascorbic acid (VITAMIN C) 500 MG tablet  Calcium Citrate-Vitamin D (CALCIUM + D PO)  cholecalciferol (VITAMIN D) 1000 UNIT tablet  cyanocobalamin (VITAMIN B-12) 100 MCG tablet  Ferrous Sulfate (IRON SUPPLEMENT PO)  FISH OIL 1000 MG OR CAPS  folic acid (FOLVITE) 1 MG tablet  MAGNESIUM PO  POTASSIUM PO  Turmeric 500 MG CAPS  vitamin E 400 UNIT capsule          Review of Systems   Constitutional: Negative.    Musculoskeletal:         Right-sided rib pain   Skin: Negative.    All other systems reviewed and are negative.      Physical Exam   BP: (!) 144/75  Pulse: 103  Temp: 98.1  F (36.7  C)  Resp: 20  SpO2: 98 %      Physical Exam  Constitutional:       General: She is not in acute distress.     Appearance: Normal appearance. She is not ill-appearing, toxic-appearing or diaphoretic.   HENT:      Head: Normocephalic and atraumatic. No raccoon eyes, Travis's sign, abrasion or contusion.      Nose: Nose normal.      Mouth/Throat:      Lips: Pink.   Eyes:      Extraocular Movements: Extraocular movements intact.      Conjunctiva/sclera: Conjunctivae normal.      Pupils: Pupils are equal, round, and reactive to light.   Cardiovascular:      Rate and Rhythm: Normal rate and regular rhythm.      Heart sounds: Normal heart sounds.   Pulmonary:      Effort: Pulmonary effort is normal. No tachypnea, accessory muscle usage or respiratory distress.      Breath sounds: Normal breath sounds. No stridor. No wheezing, rhonchi or  rales.   Chest:      Chest wall: Tenderness present. No deformity, swelling, crepitus or edema.      Comments: Tenderness to palpation along right lateral and posterior ribs.  No crepitus or swelling.  No ecchymosis or signs of trauma.  Abdominal:      General: There is no distension.      Palpations: Abdomen is soft.      Tenderness: There is no abdominal tenderness. There is no guarding or rebound.   Musculoskeletal:         General: No swelling, deformity or signs of injury. Normal range of motion.      Cervical back: Neck supple.   Skin:     General: Skin is warm and dry.   Neurological:      General: No focal deficit present.      Mental Status: She is alert.      Cranial Nerves: No cranial nerve deficit.   Psychiatric:         Behavior: Behavior is cooperative.         ED Course        Procedures    Results for orders placed or performed during the hospital encounter of 02/15/25 (from the past 24 hours)   Ribs XR, unilat 3 views + PA chest, right    Narrative    EXAM: XR RIBS and CHEST RT 3VW  LOCATION: [facility]  DATE: 2/15/2025    INDICATION: fall, right lateral and posterior rib pain  COMPARISON: None.      Impression    IMPRESSION: No pneumothorax. Small to moderate left pleural effusion. No effusion on the right. There is a right rib fracture seen only on one of the oblique views making counting difficult.   CBC with platelets differential    Narrative    The following orders were created for panel order CBC with platelets differential.  Procedure                               Abnormality         Status                     ---------                               -----------         ------                     CBC with platelets and d...[[id number]]  Abnormal            Final result                 Please view results for these tests on the individual orders.   Comprehensive metabolic panel   Result Value Ref Range    Sodium 133 (L) 135 - 145 mmol/L    Potassium 4.2 3.4 - 5.3  mmol/L    Carbon Dioxide (CO2) 26 22 - 29 mmol/L    Anion Gap 11 7 - 15 mmol/L    Urea Nitrogen 10.7 8.0 - 23.0 mg/dL    Creatinine 0.59 0.51 - 0.95 mg/dL    GFR Estimate >90 >60 mL/min/1.73m2    Calcium 9.3 8.8 - 10.4 mg/dL    Chloride 96 (L) 98 - 107 mmol/L    Glucose 134 (H) 70 - 99 mg/dL    Alkaline Phosphatase 96 40 - 150 U/L    AST 46 (H) 0 - 45 U/L    ALT 35 0 - 50 U/L    Protein Total 7.4 6.4 - 8.3 g/dL    Albumin 4.2 3.5 - 5.2 g/dL    Bilirubin Total 0.9 <=1.2 mg/dL   CBC with platelets and differential   Result Value Ref Range    WBC Count 15.0 (H) 4.0 - 11.0 10e3/uL    RBC Count 4.60 3.80 - 5.20 10e6/uL    Hemoglobin 14.8 11.7 - 15.7 g/dL    Hematocrit 41.6 35.0 - 47.0 %    MCV 90 78 - 100 fL    MCH 32.2 26.5 - 33.0 pg    MCHC 35.6 31.5 - 36.5 g/dL    RDW 12.6 10.0 - 15.0 %    Platelet Count 213 150 - 450 10e3/uL    % Neutrophils 91 %    % Lymphocytes 5 %    % Monocytes 3 %    % Eosinophils 0 %    % Basophils 0 %    % Immature Granulocytes 0 %    NRBCs per 100 WBC 0 <1 /100    Absolute Neutrophils 13.7 (H) 1.6 - 8.3 10e3/uL    Absolute Lymphocytes 0.8 0.8 - 5.3 10e3/uL    Absolute Monocytes 0.5 0.0 - 1.3 10e3/uL    Absolute Eosinophils 0.0 0.0 - 0.7 10e3/uL    Absolute Basophils 0.0 0.0 - 0.2 10e3/uL    Absolute Immature Granulocytes 0.1 <=0.4 10e3/uL    Absolute NRBCs 0.0 10e3/uL   CTA Chest with Contrast    Narrative    EXAM: CTA CHEST WITH CONTRAST  LOCATION: [facility]  DATE: 2/15/2025    INDICATION: fall, right sided rib pain, left pleural effusion noted on x ray  COMPARISON: Chest x-ray earlier today.  TECHNIQUE: Helical acquisition through the chest was performed during the arterial phase of contrast enhancement. 2D and 3D reconstructions performed by the CT technologist. Dose reduction techniques were used.  CONTRAST: 72mL Isovue 370    CT ANGIOGRAM CHEST: No thoracic aortic aneurysm or dissection. The origins of the great vessels from the aortic arch are patent without  severe stenosis. The celiac artery, superior mesenteric artery and bilateral renal artery origins are patent without   stenosis. No filling defects in the central pulmonary arteries.    LUNGS AND PLEURA: Moderate size left pleural effusion and left basilar atelectasis. No pneumothorax. No right-sided pleural effusion or hemothorax. No pulmonary nodules or masses.    MEDIASTINUM/AXILLAE: No lymphadenopathy. No pericardial effusion.    CORONARY ARTERY CALCIFICATION: Severe.    UPPER ABDOMEN: No significant finding.    MUSCULOSKELETAL: Mildly displaced fractures of the right lateral fifth and sixth rib with mild callus formation may be to acute or subacute.      Impression    IMPRESSION:  1.  No thoracic aortic dissection or aneurysm.  2.  Moderate size left pleural effusion and left basilar atelectasis.  3.  Mildly displaced acute or subacute fractures of the right lateral fifth and sixth ribs with mild callus formation.           Medications   acetaminophen (TYLENOL) tablet 1,000 mg (1,000 mg Oral $Given 2/15/25 1224)   ketorolac (TORADOL) injection 10 mg (10 mg Intravenous $Given 2/15/25 1319)   iopamidol (ISOVUE-370) solution 72 mL (72 mLs Intravenous $Given 2/15/25 1425)   sodium chloride 0.9 % bag 500mL for CT scan flush use (100 mLs As instructed $Given 2/15/25 1425)       Assessments & Plan (with Medical Decision Making)     Pt is a 69 year old female who presents to Urgent Care with complaints of right-sided rib pain since a fall last night.  Patient states she lost her balance while taking off her boots and subsequently fell, landing on the linoleum carla on her right side.  Denies head injury or loss of consciousness.  Patient has had ongoing right-sided rib pain since this morning that is worse with deep breathing and movement.  Patient denies any shortness of breath.  Does not take any blood thinners.    Pt is afebrile on arrival.  Exam as above.  O2 sats of 98% on room air.  Patient is in no  respiratory distress.  Patient was given Tylenol on arrival.  X-rays of chest and right ribs were obtained and were negative for pneumothorax.  There is a right rib fracture seen on one of the oblique views.  There is also a moderate left pleural effusion noted.  Discussed results with patient.  I am unable to see any prior chest imaging to determine the chronicity of this effusion.  She is a smoker and reports an occasional ongoing cough but denies any ongoing shortness of breath.  Patient was moved to the emergency department for further evaluation including CTA of the chest with contrast to further evaluate this pleural effusion and to evaluate even more completely for rib fractures and injury from her fall last night.  CTA chest with contrast shows a moderate size left pleural effusion and left basilar atelectasis.  This is contralateral to patient's injuries.  Also noted are mildly displaced acute or subacute fractures of the right lateral fifth and sixth ribs with mild callus formation.  Discussed results with patient and her family members.  Recommend close follow-up with primary care provider for further workup and evaluation of her left pleural effusion.  We discussed that she will need referral for thoracentesis to determine etiology.  Referral for primary care provider follow-up was placed.  Patient's pain has been controlled in the department with Toradol.  Encouraged continued symptomatic treatments at home.  Incentive spirometer provided.  Return precautions were reviewed at length.  Hand-outs were provided.    Patient was sent with Percocet and was instructed to follow-up with PCP closely as described above for continued care and management.  She is to return to the ED for persistent and/or worsening symptoms.  Patient expressed understanding of the diagnosis and plan and was discharged home in good condition.    I have reviewed the nursing notes.    I have reviewed the findings, diagnosis, plan and  need for follow up with the patient.    Discharge Medication List as of 2/15/2025  3:31 PM        START taking these medications    Details   oxyCODONE-acetaminophen (PERCOCET) 5-325 MG tablet Take 1 tablet by mouth every 6 hours as needed for pain., Disp-12 tablet, R-0, Local Print             Final diagnoses:   Fall, initial encounter   Closed fracture of multiple ribs of right side, initial encounter - two rib fractures   Pleural effusion, left       2/15/2025   [facility] EMERGENCY DEPT      Disclaimer:  This note consists of symbols derived from keyboarding, dictation and/or voice recognition software.  As a result, there may be errors in the script that have gone undetected.  Please consider this when interpreting information found in this chart.     Silvia Anthony PA-C  02/15/25 [id number]

## 2025-02-15 NOTE — ED NOTES
"Patient returned to ED for \"breathing machine\" she was supposed to discharge home with. Writer spoke with Silvia for clarification. Patient to discharge with incentive spirometer. IS provided to patient. Patient reports prior use of IS, declined RN instructions.   "

## 2025-02-20 ENCOUNTER — MYC REFILL (OUTPATIENT)
Dept: RHEUMATOLOGY | Facility: CLINIC | Age: 70
End: 2025-02-20
Payer: COMMERCIAL

## 2025-02-20 DIAGNOSIS — M05.741 RHEUMATOID ARTHRITIS INVOLVING BOTH HANDS WITH POSITIVE RHEUMATOID FACTOR (H): ICD-10-CM

## 2025-02-20 DIAGNOSIS — Z79.899 HIGH RISK MEDICATION USE: ICD-10-CM

## 2025-02-20 DIAGNOSIS — R76.8 CYCLIC CITRULLINATED PEPTIDE (CCP) ANTIBODY POSITIVE: ICD-10-CM

## 2025-02-20 DIAGNOSIS — M05.742 RHEUMATOID ARTHRITIS INVOLVING BOTH HANDS WITH POSITIVE RHEUMATOID FACTOR (H): ICD-10-CM

## 2025-02-20 RX ORDER — METHOTREXATE 2.5 MG/1
20 TABLET ORAL
Qty: 96 TABLET | Refills: 0 | Status: SHIPPED | OUTPATIENT
Start: 2025-02-20

## 2025-02-20 NOTE — TELEPHONE ENCOUNTER
Sent patient a message and notifying her no further labs needed and refills approved.    Yolanda José RN on 2/20/2025 at 1:35 PM

## 2025-02-20 NOTE — TELEPHONE ENCOUNTER
Last Rheum visit 11/26/24.     Plan:   Schedule follow-up with Clarissa Griffin PA-C in 6 months.   Labs: CBC, Creatinine, Albumin, AST, ALT, CRP and Sed Rate today and every 3 months  Imaging: xray left hand  Medication recommendations:             Methotrexate 2.5mg: Continue 20mg (8 tablets) WEEKLY. While on Methotrexate:  -check labs (ALT/AST, albumin, CBC with platelets and creatinine) every 3 months      Labs from 11/26/24 stable. Due anytime now for monitoring labs. Sent patient a lab appointment reminder message.  Waiting for response.    Yolanda José RN on 2/20/2025 at 11:41 AM

## 2025-02-20 NOTE — TELEPHONE ENCOUNTER
Patient had labs on 2/15/2025.  These cover all of my labs.  She does not need further labs at this time.  Methotrexate refilled.    Clarissa Griffin, [facility]

## 2025-03-07 ENCOUNTER — ANCILLARY PROCEDURE (OUTPATIENT)
Dept: GENERAL RADIOLOGY | Facility: CLINIC | Age: 70
End: 2025-03-07
Payer: COMMERCIAL

## 2025-03-07 DIAGNOSIS — J90 PLEURAL EFFUSION: ICD-10-CM

## 2025-03-07 DIAGNOSIS — Z09 HOSPITAL DISCHARGE FOLLOW-UP: ICD-10-CM

## 2025-03-07 PROCEDURE — 71111 X-RAY EXAM RIBS/CHEST4/> VWS: CPT | Mod: TC | Performed by: RADIOLOGY

## 2025-03-10 ENCOUNTER — PATIENT OUTREACH (OUTPATIENT)
Dept: ONCOLOGY | Facility: CLINIC | Age: 70
End: 2025-03-10
Payer: COMMERCIAL

## 2025-03-10 ENCOUNTER — MYC MEDICAL ADVICE (OUTPATIENT)
Dept: FAMILY MEDICINE | Facility: CLINIC | Age: 70
End: 2025-03-10
Payer: COMMERCIAL

## 2025-03-10 DIAGNOSIS — D48.5 NEOPLASM OF UNCERTAIN BEHAVIOR OF SKIN: Primary | ICD-10-CM

## 2025-03-11 NOTE — TELEPHONE ENCOUNTER
See My chart message.     Patient did read your result note-perhaps she doesn't understand it?    Domenica Liang RN

## 2025-03-11 NOTE — TELEPHONE ENCOUNTER
RECORDS STATUS - ALL OTHER DIAGNOSIS      RECORDS RECEIVED FROM: [facility]   NOTES STATUS DETAILS   OFFICE NOTE from referring provider Epic 3/7/2025 - ANNALEE Starkey CNP   MEDICATION LIST [facility]    LABS     PATHOLOGY REPORTS     ANYTHING RELATED TO DIAGNOSIS Epic 2/15/2025   IMAGING (NEED IMAGES & REPORT)     CT SCANS PACS CT Chest: 2/15/2025   XRAYS PACS Xray Ribs: 3/7/2025, 2/15/2025

## 2025-03-11 NOTE — TELEPHONE ENCOUNTER
Spoke with patient regarding results of chest x-ray and referral to pulmonology. Patient reports she understands.     Thank you    With Kind Regards,    ANNALEE Woodward CNP

## 2025-03-12 ENCOUNTER — DOCUMENTATION ONLY (OUTPATIENT)
Dept: PULMONOLOGY | Facility: CLINIC | Age: 70
End: 2025-03-12
Payer: COMMERCIAL

## 2025-03-13 NOTE — NURSING NOTE
Pre-visit planning and chart review completed.     NEW patient appointment:    3/18 with Dr. Jesús Spear  3/7 CXR    - No anticoagulation.  - Current smoker.  - PLEF    CE updated. Medications, allergies, problem list, and immunizations reconciled.

## 2025-03-18 ENCOUNTER — PRE VISIT (OUTPATIENT)
Dept: PULMONOLOGY | Facility: CLINIC | Age: 70
End: 2025-03-18
Payer: COMMERCIAL

## 2025-03-18 ENCOUNTER — VIRTUAL VISIT (OUTPATIENT)
Dept: PULMONOLOGY | Facility: CLINIC | Age: 70
End: 2025-03-18
Payer: COMMERCIAL

## 2025-03-18 VITALS — HEIGHT: 64 IN | WEIGHT: 213 LBS | BODY MASS INDEX: 36.37 KG/M2

## 2025-03-18 DIAGNOSIS — M05.742 RHEUMATOID ARTHRITIS INVOLVING BOTH HANDS WITH POSITIVE RHEUMATOID FACTOR (H): Primary | ICD-10-CM

## 2025-03-18 DIAGNOSIS — J90 PLEURAL EFFUSION: ICD-10-CM

## 2025-03-18 DIAGNOSIS — M05.741 RHEUMATOID ARTHRITIS INVOLVING BOTH HANDS WITH POSITIVE RHEUMATOID FACTOR (H): Primary | ICD-10-CM

## 2025-03-18 PROCEDURE — 1126F AMNT PAIN NOTED NONE PRSNT: CPT | Mod: 95 | Performed by: INTERNAL MEDICINE

## 2025-03-18 PROCEDURE — 98002 SYNCH AUDIO-VIDEO NEW MOD 45: CPT | Performed by: INTERNAL MEDICINE

## 2025-03-18 ASSESSMENT — PAIN SCALES - GENERAL: PAINLEVEL_OUTOF10: NO PAIN (0)

## 2025-03-18 NOTE — LETTER
3/18/2025       RE: Mirian Cruz  [address]  [facility] [address]     Dear Colleague,    Thank you for referring your patient, Mirian Cruz, [facility]. Please see a copy of my visit note below.    Virtual Visit Details    Type of service:  Video Visit   Video Start Time:  0859  Video End Time: 0915    Originating Location (pt. Location): Home    Distant Location (provider location):  On-site  Platform used for Video Visit: [facility]  [facility]G [facility]  [facility], [facility]     Mirian Cruz MRN# [id number]   Age: 69 year old YOB: 1955       Requesting Physician: ANNALEE Kingsley CNP  [address] [place] [address]       Assessment and Plan:    1. New L pleural effusion. There is a risk for malignant effusion vs RA or other. We can see this as a reaction to bleeding into the pleural space after trauma.  Plan for diagnostic and therapeutic thoracentesis. We can consider further evaluation pending these results.           History:     Mirian Cruz is a 69 year old female with sig h/o for tobacco use who is here for evaluation/followup of pleural effusion. It occurred in the setting of falls.    She has an occasional cough.    She denies any other symptoms including shortness of breath, chest pain.    She has RA and takes methotrexate for this.    Professional cook -     - My interpretation of the images relevant for this visit includes: moderate free flowing L effusion              Past Medical History:    Rheumatoid arthritis       Past Surgical History:      Past Surgical History:   Procedure Laterality Date     ARTHROPLASTY KNEE Right 2/18/2015    Procedure: ARTHROPLASTY KNEE;  Surgeon: Zelalem Mendez MD;  Location: [facility]     ARTHROPLASTY KNEE Left 4/5/2017    Procedure:  ARTHROPLASTY KNEE;  Surgeon: Zelalem Mendez MD;  Location: [facility]     BUNIONECTOMY CHEVRON AND REUBEN, COMBINED  3/25/2011    COMBINED BUNIONECTOMY CHEVRON AND REUBEN performed by TRISTON SEWELL at [facility]     CYSTOSCOPY  5/20/2013    Procedure: CYSTOSCOPY;;  Surgeon: Adan Morrison MD;  Location: [facility]     REMOVE HARDWARE FOOT  2/15/2012    Procedure:REMOVE HARDWARE FOOT; Removal of hardware left foot; Surgeon:TRISTON SEWELL; Location:[facility]     SLING TRANSVAGINAL  5/20/2013    Procedure: SLING TRANSVAGINAL;  Transvaginal taping, cystoscopy;  Surgeon: Adan Morrison MD;  Location: [facility]     TUBAL LIGATION            Social History:     Social History     Tobacco Use     Smoking status: Light Smoker     Current packs/day: 0.50     Average packs/day: 0.5 packs/day for 44.2 years (22.1 ttl pk-yrs)     Types: Cigarettes     Start date: 1981     Passive exposure: Current     Smokeless tobacco: Never     Tobacco comments:     3 cigarettes/ day 11/26/24   Substance Use Topics     Alcohol use: Yes     Alcohol/week: 0.0 standard drinks of alcohol     Comment: occ          Family History:     Family History   Problem Relation Age of Onset     Diabetes Mother      Gastrointestinal Disease Father         colon polyps     Diabetes Maternal Grandmother            Allergies:      Allergies   Allergen Reactions     Influenza Vaccine Live      History of Guillain-[place]     Septra [Sulfamethoxazole-Trimethoprim] GI Disturbance and Blisters     Stomatitis with mouth ulcerations          Medications:     Current Outpatient Medications   Medication Sig Dispense Refill     ascorbic acid (VITAMIN C) 500 MG tablet Take 1 tablet by mouth daily. 100 tablet 12     Calcium Citrate-Vitamin D (CALCIUM + D PO) Take 1 tablet by mouth daily       cholecalciferol (VITAMIN D) 1000 UNIT tablet Take 1 tablet by mouth daily.       cyanocobalamin (VITAMIN B-12) 100 MCG tablet Take 100 mcg by mouth daily       Ferrous  "Sulfate (IRON SUPPLEMENT PO) Take 325 mg by mouth three times a week       FISH OIL 1000 MG OR CAPS 1 daily       folic acid (FOLVITE) 1 MG tablet Take 1 tablet (1 mg) by mouth daily 90 tablet 3     MAGNESIUM PO Take 1 tablet by mouth daily       methotrexate 2.5 MG tablet Take 8 tablets (20 mg) by mouth every 7 days. Labs every 8 - 12 weeks for refills. 96 tablet 0     omeprazole (PRILOSEC) 40 MG DR capsule Take 1 capsule (40 mg) by mouth daily. 90 capsule 3     POTASSIUM PO Take 1 tablet by mouth daily       Turmeric 500 MG CAPS Take by mouth daily       vitamin E 400 UNIT capsule Take 1 capsule by mouth daily 100 capsule 12     No current facility-administered medications for this visit.          Review of Systems:     See HPI         Physical Exam:   Ht 1.626 m (5' 4\")   Wt 96.6 kg (213 lb)   LMP 05/20/2007 (LMP Unknown)   BMI 36.56 kg/m      Constitutional - looks well, in no apparent distress  Eyes - no redness or discharge  Respiratory -breathing appears comfortable. No wheeze or rhonchi.   Skin - No appreciable discoloration or lesions (very limited exam)  Neurological - No apparent tremors. Speech fluent and articlate  Psychiatric - no signs of delirium or anxiety          Current Laboratory Data:   All laboratory and imaging data reviewed.    No results found for this or any previous visit (from the past 24 hours).               Again, thank you for allowing me to participate in the care of your patient.      Sincerely,    Jesús Spear MD    "

## 2025-03-18 NOTE — NURSING NOTE
Current patient location: [address] [place] [id number]    Is the patient currently in the state of [place]? YES    Visit mode: VIDEO    If the visit is dropped, the patient can be reconnected by:VIDEO VISIT: Text to cell phone:   Telephone Information:   Mobile [phone]       Will anyone else be joining the visit? NO  (If patient encounters technical issues they should call [phone]    Are changes needed to the allergy or medication list? Pt completed echeck-in an confirms medications and allergies are correct.      Are refills needed on medications prescribed by this physician? NO    Rooming Documentation:  Questionnaire(s) not done per department protocol    Reason for visit: Consult    Rusty GARVIN

## 2025-03-18 NOTE — PROGRESS NOTES
Virtual Visit Details    Type of service:  Video Visit   Video Start Time:  0859  Video End Time: 0915    Originating Location (pt. Location): Home    Distant Location (provider location):  On-site  Platform used for Video Visit: [facility]  LUNG NODULE & INTERVENTIONAL PULMONARY CLINIC  [facility], [facility]     Mirian Cruz MRN# [id number]   Age: 69 year old YOB: 1955       Requesting Physician: ANNALEE [facility]  [address] [place] [address]       Assessment and Plan:    1. New L pleural effusion. There is a risk for malignant effusion vs RA or other. We can see this as a reaction to bleeding into the pleural space after trauma.  Plan for diagnostic and therapeutic thoracentesis. We can consider further evaluation pending these results.           History:     Mirian Cruz is a 69 year old female with sig h/o for tobacco use who is here for evaluation/followup of pleural effusion. It occurred in the setting of falls.    She has an occasional cough.    She denies any other symptoms including shortness of breath, chest pain.    She has RA and takes methotrexate for this.    Professional cook -     - My interpretation of the images relevant for this visit includes: moderate free flowing L effusion              Past Medical History:    Rheumatoid arthritis       Past Surgical History:      Past Surgical History:   Procedure Laterality Date    ARTHROPLASTY KNEE Right 2/18/2015    Procedure: ARTHROPLASTY KNEE;  Surgeon: Zelalem Mendez MD;  Location: [facility]    ARTHROPLASTY KNEE Left 4/5/2017    Procedure: ARTHROPLASTY KNEE;  Surgeon: Zelalem Mendez MD;  Location: [facility]    BUNIONECTOMY CHENICOLETTE AND REUBEN, COMBINED  3/25/2011    COMBINED BUNIONECTOMY CHEVRON AND REUBEN performed by TRISTON SEWELL at [facility]    CYSTOSCOPY  5/20/2013    Procedure: CYSTOSCOPY;;  Surgeon: Adan Morrison MD;  Location: [facility]     REMOVE HARDWARE FOOT  2/15/2012    Procedure:REMOVE HARDWARE FOOT; Removal of hardware left foot; Surgeon:TRISTON SEWELL; Location:[facility]    SLING TRANSVAGINAL  5/20/2013    Procedure: SLING TRANSVAGINAL;  Transvaginal taping, cystoscopy;  Surgeon: Adan Morrison MD;  Location: [facility]    TUBAL LIGATION            Social History:     Social History     Tobacco Use    Smoking status: Light Smoker     Current packs/day: 0.50     Average packs/day: 0.5 packs/day for 44.2 years (22.1 ttl pk-yrs)     Types: Cigarettes     Start date: 1981     Passive exposure: Current    Smokeless tobacco: Never    Tobacco comments:     3 cigarettes/ day 11/26/24   Substance Use Topics    Alcohol use: Yes     Alcohol/week: 0.0 standard drinks of alcohol     Comment: occ          Family History:     Family History   Problem Relation Age of Onset    Diabetes Mother     Gastrointestinal Disease Father         colon polyps    Diabetes Maternal Grandmother            Allergies:      Allergies   Allergen Reactions    Influenza Vaccine Live      History of Guillain-Gallion    Septra [Sulfamethoxazole-Trimethoprim] GI Disturbance and Blisters     Stomatitis with mouth ulcerations          Medications:     Current Outpatient Medications   Medication Sig Dispense Refill    ascorbic acid (VITAMIN C) 500 MG tablet Take 1 tablet by mouth daily. 100 tablet 12    Calcium Citrate-Vitamin D (CALCIUM + D PO) Take 1 tablet by mouth daily      cholecalciferol (VITAMIN D) 1000 UNIT tablet Take 1 tablet by mouth daily.      cyanocobalamin (VITAMIN B-12) 100 MCG tablet Take 100 mcg by mouth daily      Ferrous Sulfate (IRON SUPPLEMENT PO) Take 325 mg by mouth three times a week      FISH OIL 1000 MG OR CAPS 1 daily      folic acid (FOLVITE) 1 MG tablet Take 1 tablet (1 mg) by mouth daily 90 tablet 3    MAGNESIUM PO Take 1 tablet by mouth daily      methotrexate 2.5 MG tablet Take 8 tablets (20 mg) by mouth every 7 days. Labs every 8 - 12 weeks for  "refills. 96 tablet 0    omeprazole (PRILOSEC) 40 MG DR capsule Take 1 capsule (40 mg) by mouth daily. 90 capsule 3    POTASSIUM PO Take 1 tablet by mouth daily      Turmeric 500 MG CAPS Take by mouth daily      vitamin E 400 UNIT capsule Take 1 capsule by mouth daily 100 capsule 12     No current facility-administered medications for this visit.          Review of Systems:     See HPI         Physical Exam:   Ht 1.626 m (5' 4\")   Wt 96.6 kg (213 lb)   LMP 05/20/2007 (LMP Unknown)   BMI 36.56 kg/m      Constitutional - looks well, in no apparent distress  Eyes - no redness or discharge  Respiratory -breathing appears comfortable. No wheeze or rhonchi.   Skin - No appreciable discoloration or lesions (very limited exam)  Neurological - No apparent tremors. Speech fluent and articlate  Psychiatric - no signs of delirium or anxiety          Current Laboratory Data:   All laboratory and imaging data reviewed.    No results found for this or any previous visit (from the past 24 hours).               "

## 2025-03-19 DIAGNOSIS — J90 PLEURAL EFFUSION: Primary | ICD-10-CM

## 2025-03-25 ENCOUNTER — HOSPITAL ENCOUNTER (OUTPATIENT)
Dept: CARDIOLOGY | Facility: HOSPITAL | Age: 70
Discharge: HOME | End: 2025-03-25
Attending: INTERNAL MEDICINE
Payer: COMMERCIAL

## 2025-03-25 ENCOUNTER — HOSPITAL ENCOUNTER (OUTPATIENT)
Dept: RADIOLOGY | Facility: HOSPITAL | Age: 70
Discharge: HOME | End: 2025-03-25
Attending: INTERNAL MEDICINE
Payer: COMMERCIAL

## 2025-03-25 ENCOUNTER — APPOINTMENT (OUTPATIENT)
Dept: LAB | Facility: HOSPITAL | Age: 70
End: 2025-03-25
Attending: INTERNAL MEDICINE
Payer: COMMERCIAL

## 2025-03-25 VITALS
RESPIRATION RATE: 15 BRPM | HEART RATE: 85 BPM | TEMPERATURE: 97.9 F | SYSTOLIC BLOOD PRESSURE: 137 MMHG | OXYGEN SATURATION: 99 % | DIASTOLIC BLOOD PRESSURE: 71 MMHG

## 2025-03-25 DIAGNOSIS — J90 PLEURAL EFFUSION: ICD-10-CM

## 2025-03-25 DIAGNOSIS — J90 PLEURAL EFFUSION, LEFT: Primary | ICD-10-CM

## 2025-03-25 LAB
AMYLASE BODY FLUID SOURCE: NORMAL
AMYLASE FLD-CCNC: 23 U/L
CHOLEST FLD-MCNC: 719 MG/DL
CHOLESTEROL BODY FLUID SOURCE: NORMAL
GLUCOSE BODY FLUID SOURCE: NORMAL
GLUCOSE FLD-MCNC: <2 MG/DL
LD BODY BODY FLUID SOURCE: NORMAL
LDH FLD L TO P-CCNC: 956 U/L
LDH SERPL L TO P-CCNC: 192 U/L (ref 0–250)
PH BODY FLUID SOURCE: NORMAL
PH FLD: 7.5 PH
PROT FLD-MCNC: 4 G/DL
PROT SERPL-MCNC: 7.3 G/DL (ref 6.4–8.3)
PROTEIN BODY FLUID SOURCE: NORMAL
TRIGL FLD-MCNC: 29 MG/DL
TRIGLYCERIDE BODY FLUID SOURCE: NORMAL

## 2025-03-25 PROCEDURE — 84478 ASSAY OF TRIGLYCERIDES: CPT | Performed by: INTERNAL MEDICINE

## 2025-03-25 PROCEDURE — 84157 ASSAY OF PROTEIN OTHER: CPT | Performed by: INTERNAL MEDICINE

## 2025-03-25 PROCEDURE — 84155 ASSAY OF PROTEIN SERUM: CPT | Performed by: INTERNAL MEDICINE

## 2025-03-25 PROCEDURE — 999N000065 XR CHEST PORT 1 VIEW

## 2025-03-25 PROCEDURE — 89050 BODY FLUID CELL COUNT: CPT | Performed by: INTERNAL MEDICINE

## 2025-03-25 PROCEDURE — 83615 LACTATE (LD) (LDH) ENZYME: CPT | Performed by: INTERNAL MEDICINE

## 2025-03-25 PROCEDURE — 83986 ASSAY PH BODY FLUID NOS: CPT | Performed by: INTERNAL MEDICINE

## 2025-03-25 PROCEDURE — 87070 CULTURE OTHR SPECIMN AEROBIC: CPT | Performed by: INTERNAL MEDICINE

## 2025-03-25 PROCEDURE — 82945 GLUCOSE OTHER FLUID: CPT | Performed by: INTERNAL MEDICINE

## 2025-03-25 PROCEDURE — 82465 ASSAY BLD/SERUM CHOLESTEROL: CPT | Performed by: INTERNAL MEDICINE

## 2025-03-25 PROCEDURE — 82150 ASSAY OF AMYLASE: CPT | Performed by: INTERNAL MEDICINE

## 2025-03-25 PROCEDURE — 36415 COLL VENOUS BLD VENIPUNCTURE: CPT | Performed by: INTERNAL MEDICINE

## 2025-03-25 NOTE — PROCEDURES
THORACENTESIS PROCEDURE NOTE  (NON-OR)    PATIENT NAME:    Mirian Cruz,  1955,  MRN# [id number]      Procedure Date: 3/25/2025   Performing Physician: Yee Betts MD    Pre-Procedure Diagnosis:  Left pleural effusion  Post-Procedure Diagnosis:  Same as Pre-Procedure Diagnosis    Indications:     Estimated Blood Loss:  1  ml    Complications:  None; patient tolerated the procedure well.   Findings: 600 ml of yellow pleural fluid was obtained.   Was US used to ariana area on patient where needle was to be placed and permanent image obtained: yes  Specimens:   A sample was sent to Lab for appropriate testing. Please see orders for full list of tests ordered.    Procedure Details:   The risks, benefits, complications, treatment options, and expected outcomes were discussed with PATIENT   .  The risks and potential complications of their problem and purposed treatment include but are not limited to infection, bleeding, pain, pneumothorax.    The patient concurred with the proposed plan, giving informed consent.  The site of the procedure was properly noted/marked. The patient was identified as Mirian Cruz with Date of Birth 1955 and the procedure verified as THORACENTESIS .  A Time Out was held and the above information confirmed.      Under sterile conditions the patient was positioned.  Betadine solution and sterile drapes were utilized.  5 ml of 1% LIDOCAINE USE  used to anesthetize the left 9th rib space.  Fluid was obtained without any difficulties and minimal blood loss.  A dressing was applied to the wound.  A follow up chest x-ray was ordered.    Condition: Stable  ________________________________________________________________________  Yee Betts MD  Pulmonary and Critical Care  [facility]

## 2025-03-26 DIAGNOSIS — M05.741 RHEUMATOID ARTHRITIS INVOLVING BOTH HANDS WITH POSITIVE RHEUMATOID FACTOR (H): ICD-10-CM

## 2025-03-26 DIAGNOSIS — J90 PLEURAL EFFUSION: Primary | ICD-10-CM

## 2025-03-26 DIAGNOSIS — M05.742 RHEUMATOID ARTHRITIS INVOLVING BOTH HANDS WITH POSITIVE RHEUMATOID FACTOR (H): ICD-10-CM

## 2025-03-26 LAB
APPEARANCE FLD: ABNORMAL
COLOR FLD: YELLOW
LYMPHOCYTES NFR FLD MANUAL: 53 %
MONOS+MACROS NFR FLD MANUAL: 2 %
NEUTS BAND NFR FLD MANUAL: 45 %
PATH REV: NORMAL
SPECIMEN SOURCE FLD: ABNORMAL
WBC # FLD AUTO: 566 /UL

## 2025-03-27 LAB
BACTERIA PLR CULT: NORMAL
GRAM STAIN RESULT: NORMAL
GRAM STAIN RESULT: NORMAL

## 2025-03-30 LAB
BACTERIA PLR CULT: NO GROWTH
GRAM STAIN RESULT: NORMAL
GRAM STAIN RESULT: NORMAL

## 2025-04-09 ENCOUNTER — OFFICE VISIT (OUTPATIENT)
Dept: DERMATOLOGY | Facility: CLINIC | Age: 70
End: 2025-04-09
Payer: COMMERCIAL

## 2025-04-09 DIAGNOSIS — D22.9 MULTIPLE BENIGN NEVI: ICD-10-CM

## 2025-04-09 DIAGNOSIS — L81.4 LENTIGO: Primary | ICD-10-CM

## 2025-04-09 DIAGNOSIS — L70.0 COMEDONE: ICD-10-CM

## 2025-04-09 DIAGNOSIS — D18.01 CHERRY ANGIOMA: ICD-10-CM

## 2025-04-09 DIAGNOSIS — L82.1 SEBORRHEIC KERATOSIS: ICD-10-CM

## 2025-04-09 PROCEDURE — 99203 OFFICE O/P NEW LOW 30 MIN: CPT | Performed by: PHYSICIAN ASSISTANT

## 2025-04-09 PROCEDURE — 1126F AMNT PAIN NOTED NONE PRSNT: CPT | Performed by: PHYSICIAN ASSISTANT

## 2025-04-09 ASSESSMENT — PAIN SCALES - GENERAL: PAINLEVEL_OUTOF10: NO PAIN (0)

## 2025-04-09 NOTE — PROGRESS NOTES
Mirian Cruz is a pleasant 69 year old year old female patient here today for skin check. No history of blistering sunburns and rare tanning bed usage. She uses sunscreen if outside for long periods. Her PCP is concerned about back.  Patient has no other skin complaints today.  Remainder of the HPI, Meds, PMH, Allergies, FH, and SH was reviewed in chart.    Pertinent Hx:   No personal history of skin cancer.   History reviewed. No pertinent past medical history.    Past Surgical History:   Procedure Laterality Date    ARTHROPLASTY KNEE Right 2/18/2015    Procedure: ARTHROPLASTY KNEE;  Surgeon: Zelaelm Mendez MD;  Location: [facility]    ARTHROPLASTY KNEE Left 4/5/2017    Procedure: ARTHROPLASTY KNEE;  Surgeon: Zelalem Mendez MD;  [facility]: [facility]    BUNIONECTOMY CHEROBON AND REUBEN, COMBINED  3/25/2011    COMBINED BUNIONECTOMY CHENICOLETTE AND REUBEN performed by TRISTON SEWELL at [facility]    CYSTOSCOPY  5/20/2013    Procedure: CYSTOSCOPY;;  Surgeon: Adan Morrison MD;  Location: [facility]    REMOVE HARDWARE FOOT  2/15/2012    Procedure:REMOVE HARDWARE FOOT; Removal of hardware left foot; Surgeon:TRISTON SEWELL; [facility]    SLING TRANSVAGINAL  5/20/2013    Procedure: SLING TRANSVAGINAL;  Transvaginal taping, cystoscopy;  Surgeon: Adan Morrison MD;  Location: [facility]    TUBAL LIGATION          Family History   Problem Relation Age of Onset    Diabetes Mother     Gastrointestinal Disease Father         colon polyps    Diabetes Maternal Grandmother        Social History     Socioeconomic History    Marital status:      Spouse name: Not on file    Number of children: 4    Years of education: Not on file    Highest education level: Not on file   Occupational History     Employer: [employer]   Tobacco Use    Smoking status: Light Smoker     Current packs/day: 0.50     Average packs/day: 0.5 packs/day for 44.3 years (22.1 ttl pk-yrs)     Types: Cigarettes     Start date: 1981      Passive exposure: Current    Smokeless tobacco: Never    Tobacco comments:     3 cigarettes/ day 11/26/24   Vaping Use    Vaping status: Never Used   Substance and Sexual Activity    Alcohol use: Yes     Alcohol/week: 0.0 standard drinks of alcohol     Comment: occ    Drug use: No    Sexual activity: Not Currently     Partners: Male   Other Topics Concern    Parent/sibling w/ CABG, MI or angioplasty before 65F 55M? No   Social History Narrative    Not on file     Social Drivers of Health     Financial Resource Strain: Low Risk  (12/2/2024)    Financial Resource Strain     Within the past 12 months, have you or your family members you live with been unable to get utilities (heat, electricity) when it was really needed?: No   Food Insecurity: Low Risk  (12/2/2024)    Food Insecurity     Within the past 12 months, did you worry that your food would run out before you got money to buy more?: No     Within the past 12 months, did the food you bought just not last and you didn t have money to get more?: No   Transportation Needs: Low Risk  (12/2/2024)    Transportation Needs     Within the past 12 months, has lack of transportation kept you from medical appointments, getting your medicines, non-medical meetings or appointments, work, or from getting things that you need?: No   Physical Activity: Insufficiently Active (12/2/2024)    Exercise Vital Sign     Days of Exercise per Week: 4 days     Minutes of Exercise per Session: 20 min   Stress: No Stress Concern Present (12/2/2024)    [employer] - Occupational Stress Questionnaire     Feeling of Stress : Not at all   Social Connections: Unknown (12/2/2024)    Social Connection and Isolation Panel [NHANES]     Frequency of Communication with Friends and Family: Not on file     Frequency of Social Gatherings with Friends and Family: Once a week     Attends Restoration Services: Not on file     Active Member of Clubs or Organizations: Not on file      Attends Club or Organization Meetings: Not on file     Marital Status: Not on file   Interpersonal Safety: Low Risk  (3/7/2025)    Interpersonal Safety     Do you feel physically and emotionally safe where you currently live?: Yes     Within the past 12 months, have you been hit, slapped, kicked or otherwise physically hurt by someone?: No     Within the past 12 months, have you been humiliated or emotionally abused in other ways by your partner or ex-partner?: No   Housing Stability: Low Risk  (12/2/2024)    Housing Stability     Do you have housing? : Yes     Are you worried about losing your housing?: No       Outpatient Encounter Medications as of 4/9/2025   Medication Sig Dispense Refill    ascorbic acid (VITAMIN C) 500 MG tablet Take 1 tablet by mouth daily. 100 tablet 12    Calcium Citrate-Vitamin D (CALCIUM + D PO) Take 1 tablet by mouth daily      cholecalciferol (VITAMIN D) 1000 UNIT tablet Take 1 tablet by mouth daily.      cyanocobalamin (VITAMIN B-12) 100 MCG tablet Take 100 mcg by mouth daily      Ferrous Sulfate (IRON SUPPLEMENT PO) Take 325 mg by mouth three times a week      FISH OIL 1000 MG OR CAPS 1 daily      folic acid (FOLVITE) 1 MG tablet Take 1 tablet (1 mg) by mouth daily 90 tablet 3    MAGNESIUM PO Take 1 tablet by mouth daily      methotrexate 2.5 MG tablet Take 8 tablets (20 mg) by mouth every 7 days. Labs every 8 - 12 weeks for refills. 96 tablet 0    omeprazole (PRILOSEC) 40 MG DR capsule Take 1 capsule (40 mg) by mouth daily. 90 capsule 3    POTASSIUM PO Take 1 tablet by mouth daily      Turmeric 500 MG CAPS Take by mouth daily      vitamin E 400 UNIT capsule Take 1 capsule by mouth daily 100 capsule 12     No facility-administered encounter medications on file as of 4/9/2025.             O:   NAD, WDWN, Alert & Oriented, Mood & Affect wnl, Vitals stable   Here today alone   LMP 05/20/2007 (LMP Unknown)    General appearance normal   Vitals stable   Alert, oriented and in no acute  distress      Comedones on back x 2   Stuck on papules and brown macules on trunk and ext   Red papules on trunk  Brown papules and macules with regular pigment network and borders      The remainder of skin exam is normal       Eyes: Conjunctivae/lids:Normal     ENT: Lips, mucosa: normal    MSK:Normal    Cardiovascular: peripheral edema none    Pulm: Breathing Normal    Neuro/Psych: Orientation:Alert and Orientedx3 ; Mood/Affect:normal     A/P:  1. Seborrheic keratosis, lentigo, angioma, benign nevi, comedone on back  Expressed comedones, no charge for removal. Discussed recurrence is common.   It was a pleasure speaking to Mirian Cruz today.  BENIGN LESIONS DISCUSSED WITH PATIENT:  I discussed the specifics of tumor, prognosis, and genetics of benign lesions.  I explained that treatment of these lesions would be purely cosmetic and not medically neccessary.  I discussed with patient different removal options including excision, cautery and /or laser.      Nature and genetics of benign skin lesions dicussed with patient.  Signs and Symptoms of skin cancer discussed with patient.  ABCDEs of melanoma reviewed with patient.  Patient encouraged to perform monthly skin exams.  UV precautions reviewed with patient.  Risks of non-melanoma skin cancer discussed with patient   Return to clinic in one year or sooner if needed.

## 2025-04-09 NOTE — NURSING NOTE
Chief Complaint   Patient presents with    Skin Check     Spot on back        There were no vitals filed for this visit.  Wt Readings from Last 1 Encounters:   03/18/25 96.6 kg (213 lb)       Nataliya Avalos LPN .................4/9/2025

## 2025-04-09 NOTE — LETTER
4/9/2025      Mirian Cruz  [address]  [place] [address]      Dear Colleague,    Thank you for referring your patient, Mirian Cruz, to the [facility]. Please see a copy of my visit note below.    Mirian Cruz is a pleasant 69 year old year old female patient here today for skin check. No history of blistering sunburns and rare tanning bed usage. She uses sunscreen if outside for long periods. Her PCP is concerned about back.  Patient has no other skin complaints today.  Remainder of the HPI, Meds, PMH, Allergies, FH, and SH was reviewed in chart.    Pertinent Hx:   No personal history of skin cancer.   History reviewed. No pertinent past medical history.    Past Surgical History:   Procedure Laterality Date     ARTHROPLASTY KNEE Right 2/18/2015    Procedure: ARTHROPLASTY KNEE;  Surgeon: Zelalem Mendez MD;  [facility]: [facility]     ARTHROPLASTY KNEE Left 4/5/2017    Procedure: ARTHROPLASTY KNEE;  Surgeon: Zelalem Mendez MD;  [facility]: [facility]     BUNIONECTOMY CHEVRON AND REUBEN, COMBINED  3/25/2011    COMBINED BUNIONECTOMY CHEVRON AND REUBEN performed by TRISTON SEWELL at [facility]     CYSTOSCOPY  5/20/2013    Procedure: CYSTOSCOPY;;  Surgeon: Adan Morrison MD;  Location: [facility]     REMOVE HARDWARE FOOT  2/15/2012    Procedure:REMOVE HARDWARE FOOT; Removal of hardware left foot; Surgeon:TRISTON SEWELL; [facility]     SLING TRANSVAGINAL  5/20/2013    Procedure: SLING TRANSVAGINAL;  Transvaginal taping, cystoscopy;  Surgeon: Adan Morrison MD;  Location: [facility]     TUBAL LIGATION          Family History   Problem Relation Age of Onset     Diabetes Mother      Gastrointestinal Disease Father         colon polyps     Diabetes Maternal Grandmother        Social History     Socioeconomic History     Marital status:      Spouse name: Not on file     Number of children: 4     Years of education: Not on file     Highest education level:  Not on file   Occupational History     Employer: [employer]   Tobacco Use     Smoking status: Light Smoker     Current packs/day: 0.50     Average packs/day: 0.5 packs/day for 44.3 years (22.1 ttl pk-yrs)     Types: Cigarettes     Start date: 1981     Passive exposure: Current     Smokeless tobacco: Never     Tobacco comments:     3 cigarettes/ day 11/26/24   Vaping Use     Vaping status: Never Used   Substance and Sexual Activity     Alcohol use: Yes     Alcohol/week: 0.0 standard drinks of alcohol     Comment: occ     Drug use: No     Sexual activity: Not Currently     Partners: Male   Other Topics Concern     Parent/sibling w/ CABG, MI or angioplasty before 65F 55M? No   Social History Narrative     Not on file     Social Drivers of Health     Financial Resource Strain: Low Risk  (12/2/2024)    Financial Resource Strain      Within the past 12 months, have you or your family members you live with been unable to get utilities (heat, electricity) when it was really needed?: No   Food Insecurity: Low Risk  (12/2/2024)    Food Insecurity      Within the past 12 months, did you worry that your food would run out before you got money to buy more?: No      Within the past 12 months, did the food you bought just not last and you didn t have money to get more?: No   Transportation Needs: Low Risk  (12/2/2024)    Transportation Needs      Within the past 12 months, has lack of transportation kept you from medical appointments, getting your medicines, non-medical meetings or appointments, work, or from getting things that you need?: No   Physical Activity: Insufficiently Active (12/2/2024)    Exercise Vital Sign      Days of Exercise per Week: 4 days      Minutes of Exercise per Session: 20 min   Stress: No Stress Concern Present (12/2/2024)    [employer] - Occupational Stress Questionnaire      Feeling of Stress : Not at all   Social Connections: Unknown (12/2/2024)    Social Connection and  Isolation Panel [NHANES]      Frequency of Communication with Friends and Family: Not on file      Frequency of Social Gatherings with Friends and Family: Once a week      Attends Sikhism Services: Not on file      Active Member of Clubs or Organizations: Not on file      Attends Club or Organization Meetings: Not on file      Marital Status: Not on file   Interpersonal Safety: Low Risk  (3/7/2025)    Interpersonal Safety      Do you feel physically and emotionally safe where you currently live?: Yes      Within the past 12 months, have you been hit, slapped, kicked or otherwise physically hurt by someone?: No      Within the past 12 months, have you been humiliated or emotionally abused in other ways by your partner or ex-partner?: No   Housing Stability: Low Risk  (12/2/2024)    Housing Stability      Do you have housing? : Yes      Are you worried about losing your housing?: No       Outpatient Encounter Medications as of 4/9/2025   Medication Sig Dispense Refill     ascorbic acid (VITAMIN C) 500 MG tablet Take 1 tablet by mouth daily. 100 tablet 12     Calcium Citrate-Vitamin D (CALCIUM + D PO) Take 1 tablet by mouth daily       cholecalciferol (VITAMIN D) 1000 UNIT tablet Take 1 tablet by mouth daily.       cyanocobalamin (VITAMIN B-12) 100 MCG tablet Take 100 mcg by mouth daily       Ferrous Sulfate (IRON SUPPLEMENT PO) Take 325 mg by mouth three times a week       FISH OIL 1000 MG OR CAPS 1 daily       folic acid (FOLVITE) 1 MG tablet Take 1 tablet (1 mg) by mouth daily 90 tablet 3     MAGNESIUM PO Take 1 tablet by mouth daily       methotrexate 2.5 MG tablet Take 8 tablets (20 mg) by mouth every 7 days. Labs every 8 - 12 weeks for refills. 96 tablet 0     omeprazole (PRILOSEC) 40 MG DR capsule Take 1 capsule (40 mg) by mouth daily. 90 capsule 3     POTASSIUM PO Take 1 tablet by mouth daily       Turmeric 500 MG CAPS Take by mouth daily       vitamin E 400 UNIT capsule Take 1 capsule by mouth daily 100  capsule 12     No facility-administered encounter medications on file as of 4/9/2025.             O:   NAD, WDWN, Alert & Oriented, Mood & Affect wnl, Vitals stable   Here today alone   LMP 05/20/2007 (LMP Unknown)    General appearance normal   Vitals stable   Alert, oriented and in no acute distress      Comedones on back x 2   Stuck on papules and brown macules on trunk and ext   Red papules on trunk  Brown papules and macules with regular pigment network and borders      The remainder of skin exam is normal       Eyes: Conjunctivae/lids:Normal     ENT: Lips, mucosa: normal    MSK:Normal    Cardiovascular: peripheral edema none    Pulm: Breathing Normal    Neuro/Psych: Orientation:Alert and Orientedx3 ; Mood/Affect:normal     A/P:  1. Seborrheic keratosis, lentigo, angioma, benign nevi, comedone on back  Expressed comedones, no charge for removal. Discussed recurrence is common.   It was a pleasure speaking to Mirian Cruz today.  BENIGN LESIONS DISCUSSED WITH PATIENT:  I discussed the specifics of tumor, prognosis, and genetics of benign lesions.  I explained that treatment of these lesions would be purely cosmetic and not medically neccessary.  I discussed with patient different removal options including excision, cautery and /or laser.      Nature and genetics of benign skin lesions dicussed with patient.  Signs and Symptoms of skin cancer discussed with patient.  ABCDEs of melanoma reviewed with patient.  Patient encouraged to perform monthly skin exams.  UV precautions reviewed with patient.  Risks of non-melanoma skin cancer discussed with patient   Return to clinic in one year or sooner if needed.       Again, thank you for allowing me to participate in the care of your patient.        Sincerely,        Kenia Flores PA-C    Electronically signed

## 2025-04-21 ENCOUNTER — PATIENT OUTREACH (OUTPATIENT)
Dept: CARE COORDINATION | Facility: CLINIC | Age: 70
End: 2025-04-21
Payer: COMMERCIAL

## 2025-05-14 NOTE — PROGRESS NOTES
Rheumatology Clinic Visit  [facility]  WILI Marcos     Mirian Cruz MRN# [id number]   YOB: 1955 Age: 70 year old   Date of Visit: 5/20/2025  Primary care provider: [facility]          Assessment and Plan:     Rheumatoid arthritis  CCP antibody positive  High-risk medication use   pleural effusion      Patient presents today for follow-up regarding her rheumatoid arthritis.  She is noticing an increase in the nodules.  Besides that she is not having any joint pain.  In March she had a fall and was seen in the emergency room.  When they did imaging a left pleural effusion was noted.  She did have a thoracentesis which was negative for an infection.  Total nucleated cells were 566, glucose level was less than 2, LDH was 956, pH was 7.5, and cytology was negative for malignancy but did show some small numbers of chronic inflammatory cells.  We discussed this today.  It does appear that this pleural effusion is secondary to her rheumatoid arthritis.  We discussed getting a high-resolution CT scan to further evaluate her lungs.  Will have her stop the methotrexate.  I did talk about immunosuppression with Humira with her today.  She is not sure that she wants to go on this.  She would like to first check the CT scan.  We will at least to stop the methotrexate and put her on sulfasalazine.  Side effects reviewed.  I will contact the patient with the results once they are complete.  We will get monitoring labs today and get labs again in 1 month.      The longitudinal plan of care for the diagnosis(es)/condition(s) as documented were addressed during this visit. Due to the added complexity in care, I will continue to support Valeria in the subsequent management and with ongoing continuity of care.      Plan:   Schedule follow-up with Clarissa Griffin PA-C in 3 months.   Labs: CBC, Creatinine, Albumin, AST, ALT, CRP and Sed Rate today and in 1 month  Imaging: High  resolution CT Chest  Medication recommendations:   Stop Methotrexate  Start Sulfasalazine 500mg:  Take 1 tablet daily for 1 week, then take 1 tablet twice daily for 1 week, then take 1 tab in AM and 2 tabs in PM for 1 week, then take 2 tabs twice daily. Take this medication with food.  # Sulfasalazine Risks and Benefits: The risks and benefits of sulfasalazine were discussed in detail and the patient verbalized understanding.  The risks discussed include, but are not limited to, the risk for hypersensitivity, anaphylaxis, anaphylactoid reactions, infections, bone marrow suppression,  hepatotoxicity, nausea, vomiting, and GI upset.  Oligospermia may occur in males.  I encouraged reviewing the package insert and asking any questions about the medication.      Clarissa Griffin, [facility]  Rheumatology         History of Present Illness:   Mirian Cruz presents for evaluation of rheumatoid arthritis.      Rheumatological history:  Previous medications tried: Hydroxychloroquine (stomach upset)  Current medications: Methotrexate 8 tabs weekly     Interval history May 20, 2025:  Joints are doing well but she does continue to notice an increase in the nodules.  She is not having any joint pain.  She was seen in the emergency room in March due to a fall onto her ribs which led to imaging which showed a pleural effusion.  She did have a thoracentesis and met with pulmonology.  Pleural effusion is thought to be more from her rheumatoid arthritis.  She is not having any shortness of breath and states that she is overall feeling well.    Interval history November 26, 2024:  She continues to feel that Methotrexate helps. Occasionally she will have 3 days where her shoulders will bother her. She uses Tylenol and salon pas. She still has issues with her left hand. She cannot make a fist.     Interval history June 12, 2024:  Doing well. Feels that joints are well controlled.     Interval history March 19, 2024  She has had some  numbness/tingling in her left hand. She is wondering if it is her carpal tunnel. She was going to get it fixed but as it was doing good, but as she is more symptomatic she is looking into it. She stopped the Hydroxychloroquine as it upset her stomach even with a lower dose. She has occasional shoulder pain, but feels this is weather related. She has not had any troubles in the mornings.     Interval history December 19, 2023:  She is still having some stiffness in her hands. She states that mornings have been pretty good. She had some stiffness in her shoulders but she felt this was related to the cold weather. Some days her hands are more sore than others. The increase dose of methotrexate has not made a difference in her hands.     Interval history September 19, 2023:  She states that she has been doing good. She states that she is still unable to make a fist. Her left ring does stiffen up at times. She states that her shoulders and neck are doing much better. No mouth sores or GI upset. She takes her folic acid daily and methotrexate on Wednesdays. She has not needed any Tylenol or Ibuprofen for 2 weeks.     HPI from consult of July 12, 2023:  She reports having increased pain in her hands. She has a nodule on her hand. She has pain in the back of her neck, shoulders and hips. She has some  bumps on her heels. She has stiffness in the mornings. This lasts a couple of hours until her tylenol and ibuprofen kicks in. She has not been seeing rheumatology. She saw someone over 13 years ago. Her hands have been bothering her for many years. The neck and shoulders have been bothering her for the last 6 months. The shoulder pain started out in the left shoulder and then moved to the right shoulder. Hip pain is intermittent. She has occasional trouble raising her arms above her head. No headaches, double vision or jaw pain. No fevers. No skin rashes. She does report decreased energy. 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  No shortness of breath.     Prednisone helps, but makes her heart race and makes her feel jittery and caused her to have trouble sleeping.          Review of Systems:     Constitutional: negative  Skin: negative  Eyes: negative  Ears/Nose/Throat: negative  Respiratory: No shortness of breath, dyspnea on exertion, cough, or hemoptysis  Cardiovascular: negative  Gastrointestinal: negative  Genitourinary: negative  Musculoskeletal: as above  Neurologic: negative  Psychiatric: negative  Hematologic/Lymphatic/Immunologic: negative  Endocrine: negative         Active Problem List:     Patient Active Problem List    Diagnosis Date Noted    Pleural effusion 03/07/2025     Priority: Medium    Encounter for Medicare annual wellness exam 12/03/2024     Priority: Medium    Pruritus of vagina 12/03/2024     Priority: Medium    Bilateral hip pain 12/03/2024     Priority: Medium    Screen for colon cancer 12/03/2024     Priority: Medium    Screening for hyperlipidemia 12/03/2024     Priority: Medium    Screening for diabetes mellitus 12/03/2024     Priority: Medium    Need for hepatitis C screening test 12/03/2024     Priority: Medium    Ear fullness, right 12/03/2024     Priority: Medium    Rheumatoid arthritis involving both hands with positive rheumatoid factor (H) 07/20/2022     Priority: Medium     Not on DMARDs      Status post total left knee replacement 04/05/2017     Priority: Medium    Left knee DJD 04/05/2017     Priority: Medium    Prediabetes 04/13/2016     Priority: Medium    Status post total right knee replacement 02/18/2015     Priority: Medium    OA (osteoarthritis) of knee 02/10/2015     Priority: Medium    Urinary, incontinence, stress female 05/07/2013     Priority: Medium    Urethral hypermobility 05/07/2013     Priority: Medium    Guillain Barré syndrome 10/08/2011     Priority: Medium     Hospitalized 10/1/2011 - 10/8/2011  In acute rehab 10/9/2011 -  10/18/2011      Hyperlipidemia LDL goal <130 10/31/2010     Priority: Medium    GERD (gastroesophageal reflux disease) 10/27/2009     Priority: Medium    Morbid obesity (H) 10/27/2009     Priority: Medium    Tobacco use disorder 08/20/2008     Priority: Medium     Very rare use of cigarette, does use some e-cigarettes.  4/24/16 -- patient stopped all nicotine a few weeks ago              Past Medical History:   No past medical history on file.  Past Surgical History:   Procedure Laterality Date    ARTHROPLASTY KNEE Right 2/18/2015    Procedure: ARTHROPLASTY KNEE;  Surgeon: Zelalem Mendez MD;  Location: [facility]    ARTHROPLASTY KNEE Left 4/5/2017    Procedure: ARTHROPLASTY KNEE;  Surgeon: Zelalem Mendez MD;  Location: [facility]    BUNIONECTOMY CHEVRON AND REUBEN, COMBINED  3/25/2011    COMBINED BUNIONECTOMY CHEVRON AND REUBEN performed by TRISTON SEWELL at [facility]    CYSTOSCOPY  5/20/2013    Procedure: CYSTOSCOPY;;  Surgeon: Adan Morrison MD;  Location: [facility]    REMOVE HARDWARE FOOT  2/15/2012    Procedure:REMOVE HARDWARE FOOT; Removal of hardware left foot; Surgeon:TRISOTN SEWELL; Location:[facility]    SLING TRANSVAGINAL  5/20/2013    Procedure: SLING TRANSVAGINAL;  Transvaginal taping, cystoscopy;  Surgeon: Adan Morrison MD;  Location: [facility]    TUBAL LIGATION              Social History:     Social History     Socioeconomic History    Marital status:      Spouse name: Not on file    Number of children: 4    Years of education: Not on file    Highest education level: Not on file   Occupational History     Employer: [employer]   Tobacco Use    Smoking status: Light Smoker     Current packs/day: 0.50     Average packs/day: 0.5 packs/day for 44.4 years (22.2 ttl pk-yrs)     Types: Cigarettes     Start date: 1981     Passive exposure: Current    Smokeless tobacco: Never    Tobacco comments:     3 cigarettes/ day 11/26/24   Vaping Use    Vaping status: Never Used   Substance and Sexual  Activity    Alcohol use: Yes     Alcohol/week: 0.0 standard drinks of alcohol     Comment: occ    Drug use: No    Sexual activity: Not Currently     Partners: Male   Other Topics Concern    Parent/sibling w/ CABG, MI or angioplasty before 65F 55M? No   Social History Narrative    Not on file     Social Drivers of Health     Financial Resource Strain: Low Risk  (12/2/2024)    Financial Resource Strain     Within the past 12 months, have you or your family members you live with been unable to get utilities (heat, electricity) when it was really needed?: No   Food Insecurity: Low Risk  (12/2/2024)    Food Insecurity     Within the past 12 months, did you worry that your food would run out before you got money to buy more?: No     Within the past 12 months, did the food you bought just not last and you didn t have money to get more?: No   Transportation Needs: Low Risk  (12/2/2024)    Transportation Needs     Within the past 12 months, has lack of transportation kept you from medical appointments, getting your medicines, non-medical meetings or appointments, work, or from getting things that you need?: No   Physical Activity: Insufficiently Active (12/2/2024)    Exercise Vital Sign     Days of Exercise per Week: 4 days     Minutes of Exercise per Session: 20 min   Stress: No Stress Concern Present (12/2/2024)    [place] [employer] - Occupational Stress Questionnaire     Feeling of Stress : Not at all   Social Connections: Unknown (12/2/2024)    Social Connection and Isolation Panel [NHANES]     Frequency of Communication with Friends and Family: Not on file     Frequency of Social Gatherings with Friends and Family: Once a week     Attends Protestant Services: Not on file     Active Member of Clubs or Organizations: Not on file     Attends Club or Organization Meetings: Not on file     Marital Status: Not on file   Interpersonal Safety: Low Risk  (3/7/2025)    Interpersonal Safety     Do you feel  physically and emotionally safe where you currently live?: Yes     Within the past 12 months, have you been hit, slapped, kicked or otherwise physically hurt by someone?: No     Within the past 12 months, have you been humiliated or emotionally abused in other ways by your partner or ex-partner?: No   Housing Stability: Low Risk  (12/2/2024)    Housing Stability     Do you have housing? : Yes     Are you worried about losing your housing?: No          Family History:     Family History   Problem Relation Age of Onset    Diabetes Mother     Gastrointestinal Disease Father         colon polyps    Diabetes Maternal Grandmother             Allergies:     Allergies   Allergen Reactions    Influenza Vaccine Live      History of Guillain-[place]    Septra [Sulfamethoxazole-Trimethoprim] GI Disturbance and Blisters     Stomatitis with mouth ulcerations            Medications:     Current Outpatient Medications   Medication Sig Dispense Refill    ascorbic acid (VITAMIN C) 500 MG tablet Take 1 tablet by mouth daily. 100 tablet 12    Calcium Citrate-Vitamin D (CALCIUM + D PO) Take 1 tablet by mouth daily      cholecalciferol (VITAMIN D) 1000 UNIT tablet Take 1 tablet by mouth daily.      cyanocobalamin (VITAMIN B-12) 100 MCG tablet Take 100 mcg by mouth daily      Ferrous Sulfate (IRON SUPPLEMENT PO) Take 325 mg by mouth three times a week      FISH OIL 1000 MG OR CAPS 1 daily      folic acid (FOLVITE) 1 MG tablet Take 1 tablet (1 mg) by mouth daily 90 tablet 3    MAGNESIUM PO Take 1 tablet by mouth daily      methotrexate 2.5 MG tablet Take 8 tablets (20 mg) by mouth every 7 days. Labs every 8 - 12 weeks for refills. 96 tablet 0    omeprazole (PRILOSEC) 40 MG DR capsule Take 1 capsule (40 mg) by mouth daily. 90 capsule 3    POTASSIUM PO Take 1 tablet by mouth daily      Turmeric 500 MG CAPS Take by mouth daily      vitamin E 400 UNIT capsule Take 1 capsule by mouth daily 100 capsule 12            Physical Exam:   Blood pressure  "126/79, pulse 95, temperature 97.6  F (36.4  C), temperature source Tympanic, resp. rate 16, height 1.651 m (5' 5\"), weight 97.6 kg (215 lb 3.2 oz), last menstrual period 2007, SpO2 100%, not currently breastfeeding.  Wt Readings from Last 6 Encounters:   25 96.6 kg (213 lb)   25 97.3 kg (214 lb 9.6 oz)   24 98.9 kg (218 lb)   24 99.6 kg (219 lb 9.6 oz)   24 101.8 kg (224 lb 6.4 oz)   24 100.9 kg (222 lb 6.4 oz)     Constitutional: well-developed, appearing stated age; cooperative  Eyes: nl conjunctiva, sclera  ENT: nl external ears, nose, hearing, lips,   Neck: no mass or thyroid enlargement  Resp: No shortness of breath with normal conversation  MSK: Increasing nodules over multiple fingers.  Psych: nl judgement, orientation, memory, affect.           Data:   Imagin2023 x-ray bilateral hand  IMPRESSION:   Severe arthrosis first CMC joints bilaterally. Mild-to-moderate  scattered arthrosis throughout the IP joints of the thumbs and  fingers. Mild scattered joint space narrowing MCP joints.     Small erosions are seen at the left index and middle finger MCP joint with soft tissue swelling, a finding which can be seen with rheumatoid arthritis. Soft tissue swelling elsewhere involving MCP joints on both sides. No acute fracture on either side.    CERVICAL SPINE 2/3 VIEWS 2023                                                             IMPRESSION: There is normal alignment of the cervical vertebrae;  however, there is straightening of normal cervical lordosis. Vertebral  body heights of the cervical spine are normal. Craniocervical  alignment is normal. There is no evidence for fracture of the cervical  spine. Loss of disc space height and degenerative endplate spurring at  C3-C4, C4-C5, C5-C6 and C6-C7. Mild to moderate facet arthropathy  throughout the cervical spine.    Laboratory:  2020  Creatinine 0.63, GFR greater than 90  CRP 77.4  CCP antibody " 108  Rheumatoid factor 26  Sed rate 40    7/20/2022  CRP 6.5  Rheumatoid factor 84  CBC within normal limits  Sed rate 18    7/12/2023  Creatinine 0.85, GFR 75  Albumin 4.1  ALT 16, AST 19  CRP 21.32  White blood cell count 7.4, hemoglobin 13.6, platelet count 207  Sed rate 33    9/19/2023  Creatinine 0.74, GFR 89  Albumin 4.3  ALT 19, AST 23  CRP 5.63  White blood cell count 6.8, hemoglobin 14.4, platelet count 200  Sed rate 19    12/19/2023  Creatinine 0.78, GFR 83  Albumin 4.1  ALT 19, AST 23  CRP 7.00  WBC 7.5, Hgb 13.2, lt 207  Sed Rate 24    3/19/2024  Creatinine 0.96, GFR 65  Albumin 4.1  ALT 20, AST 22  CRP 6.59  White blood cell count 7.9, hemoglobin 13.9, platelet count 191  Sed rate 16    6/12/2024  Creatinine 0.83, GFR 77  Albumin 4.3  ALT 19, AST 23  CRP 6.50  White blood cell count 9.2, hemoglobin 13.8, platelet count 196  Sed rate 16    2/15/2025  Creatinine 0.59, GFR greater than 90  Albumin 4.2  ALT 35, AST 46  White blood cell count 15.0, hemoglobin 14.8, platelet count 213

## 2025-05-20 ENCOUNTER — OFFICE VISIT (OUTPATIENT)
Dept: RHEUMATOLOGY | Facility: CLINIC | Age: 70
End: 2025-05-20
Payer: COMMERCIAL

## 2025-05-20 VITALS
RESPIRATION RATE: 16 BRPM | SYSTOLIC BLOOD PRESSURE: 126 MMHG | TEMPERATURE: 97.6 F | HEART RATE: 95 BPM | DIASTOLIC BLOOD PRESSURE: 79 MMHG | HEIGHT: 65 IN | BODY MASS INDEX: 35.85 KG/M2 | OXYGEN SATURATION: 100 % | WEIGHT: 215.2 LBS

## 2025-05-20 DIAGNOSIS — M05.741 RHEUMATOID ARTHRITIS INVOLVING BOTH HANDS WITH POSITIVE RHEUMATOID FACTOR (H): Primary | ICD-10-CM

## 2025-05-20 DIAGNOSIS — R76.8 CYCLIC CITRULLINATED PEPTIDE (CCP) ANTIBODY POSITIVE: ICD-10-CM

## 2025-05-20 DIAGNOSIS — J90 PLEURAL EFFUSION: ICD-10-CM

## 2025-05-20 DIAGNOSIS — M05.742 RHEUMATOID ARTHRITIS INVOLVING BOTH HANDS WITH POSITIVE RHEUMATOID FACTOR (H): Primary | ICD-10-CM

## 2025-05-20 DIAGNOSIS — Z79.899 HIGH RISK MEDICATION USE: ICD-10-CM

## 2025-05-20 LAB
CRP SERPL-MCNC: 7.94 MG/L
ERYTHROCYTE [SEDIMENTATION RATE] IN BLOOD BY WESTERGREN METHOD: 18 MM/HR (ref 0–30)

## 2025-05-20 PROCEDURE — 3074F SYST BP LT 130 MM HG: CPT | Performed by: PHYSICIAN ASSISTANT

## 2025-05-20 PROCEDURE — 1125F AMNT PAIN NOTED PAIN PRSNT: CPT | Performed by: PHYSICIAN ASSISTANT

## 2025-05-20 PROCEDURE — 85652 RBC SED RATE AUTOMATED: CPT | Performed by: PHYSICIAN ASSISTANT

## 2025-05-20 PROCEDURE — 86140 C-REACTIVE PROTEIN: CPT | Performed by: PHYSICIAN ASSISTANT

## 2025-05-20 PROCEDURE — G2211 COMPLEX E/M VISIT ADD ON: HCPCS | Performed by: PHYSICIAN ASSISTANT

## 2025-05-20 PROCEDURE — 99214 OFFICE O/P EST MOD 30 MIN: CPT | Performed by: PHYSICIAN ASSISTANT

## 2025-05-20 PROCEDURE — 3078F DIAST BP <80 MM HG: CPT | Performed by: PHYSICIAN ASSISTANT

## 2025-05-20 PROCEDURE — 36415 COLL VENOUS BLD VENIPUNCTURE: CPT | Performed by: PHYSICIAN ASSISTANT

## 2025-05-20 RX ORDER — METHOTREXATE 2.5 MG/1
20 TABLET ORAL
Qty: 96 TABLET | Refills: 0 | Status: CANCELLED | OUTPATIENT
Start: 2025-05-20

## 2025-05-20 RX ORDER — SULFASALAZINE 500 MG/1
TABLET, DELAYED RELEASE ORAL
Qty: 360 TABLET | Refills: 0 | Status: SHIPPED | OUTPATIENT
Start: 2025-05-20

## 2025-05-20 ASSESSMENT — PAIN SCALES - GENERAL: PAINLEVEL_OUTOF10: MODERATE PAIN (6)

## 2025-05-20 NOTE — PATIENT INSTRUCTIONS
After Visit Instructions:     Thank you for coming to [facility] for your care. It is my goal to partner with you to help you reach your optimal state of health.     Plan:     Schedule follow-up with Clarissa Griffin PA-C in 3 months.   Labs: CBC, Creatinine, Albumin, AST, ALT, CRP and Sed Rate today and in 1 month  Imaging: High resolution CT Chest  Medication recommendations:   Stop Methotrexate  Start Sulfasalazine 500mg:  Take 1 tablet daily for 1 week, then take 1 tablet twice daily for 1 week, then take 1 tab in AM and 2 tabs in PM for 1 week, then take 2 tabs twice daily. Take this medication with food.  # Sulfasalazine Risks and Benefits: The risks and benefits of sulfasalazine were discussed in detail and the patient verbalized understanding.  The risks discussed include, but are not limited to, the risk for hypersensitivity, anaphylaxis, anaphylactoid reactions, infections, bone marrow suppression,  hepatotoxicity, nausea, vomiting, and GI upset.  Oligospermia may occur in males.  I encouraged reviewing the package insert and asking any questions about the medication.      Clarissa Griffin PA-C  [facility]  [facility]    Contact information: [facility]  Clinic Number:  [phone]  Please call or send a [employer] message with any questions about your care

## 2025-05-22 ENCOUNTER — RESULTS FOLLOW-UP (OUTPATIENT)
Dept: RHEUMATOLOGY | Facility: CLINIC | Age: 70
End: 2025-05-22

## 2025-05-22 DIAGNOSIS — M05.742 RHEUMATOID ARTHRITIS INVOLVING BOTH HANDS WITH POSITIVE RHEUMATOID FACTOR (H): Primary | ICD-10-CM

## 2025-05-22 DIAGNOSIS — R76.8 CYCLIC CITRULLINATED PEPTIDE (CCP) ANTIBODY POSITIVE: ICD-10-CM

## 2025-05-22 DIAGNOSIS — J90 PLEURAL EFFUSION: ICD-10-CM

## 2025-05-22 DIAGNOSIS — M05.741 RHEUMATOID ARTHRITIS INVOLVING BOTH HANDS WITH POSITIVE RHEUMATOID FACTOR (H): Primary | ICD-10-CM

## 2025-05-22 DIAGNOSIS — Z79.899 HIGH RISK MEDICATION USE: ICD-10-CM

## 2025-06-04 ENCOUNTER — TELEPHONE (OUTPATIENT)
Dept: RHEUMATOLOGY | Facility: CLINIC | Age: 70
End: 2025-06-04
Payer: COMMERCIAL

## 2025-06-04 NOTE — TELEPHONE ENCOUNTER
MTM referral from: [place] clinic visit (referral by provider)    MTM referral outreach attempt #2 on June 4, 2025 at 11:40 AM      Outcome: Patient not reachable after several attempts, routed to Pharmacist Team/Provider as an [employer] Message Sent    [facility]      Patient called back and scheduled visit.

## 2025-06-05 ENCOUNTER — LAB (OUTPATIENT)
Dept: LAB | Facility: CLINIC | Age: 70
End: 2025-06-05
Payer: COMMERCIAL

## 2025-06-05 DIAGNOSIS — M05.741 RHEUMATOID ARTHRITIS INVOLVING BOTH HANDS WITH POSITIVE RHEUMATOID FACTOR (H): ICD-10-CM

## 2025-06-05 DIAGNOSIS — R76.8 CYCLIC CITRULLINATED PEPTIDE (CCP) ANTIBODY POSITIVE: ICD-10-CM

## 2025-06-05 DIAGNOSIS — M05.742 RHEUMATOID ARTHRITIS INVOLVING BOTH HANDS WITH POSITIVE RHEUMATOID FACTOR (H): ICD-10-CM

## 2025-06-05 DIAGNOSIS — Z79.899 HIGH RISK MEDICATION USE: ICD-10-CM

## 2025-06-05 LAB
ALBUMIN SERPL BCG-MCNC: 4 G/DL (ref 3.5–5.2)
ALT SERPL W P-5'-P-CCNC: 19 U/L (ref 0–50)
AST SERPL W P-5'-P-CCNC: 23 U/L (ref 0–45)
CREAT SERPL-MCNC: 0.92 MG/DL (ref 0.51–0.95)
CRP SERPL-MCNC: 35.22 MG/L
EGFRCR SERPLBLD CKD-EPI 2021: 67 ML/MIN/1.73M2
ERYTHROCYTE [DISTWIDTH] IN BLOOD BY AUTOMATED COUNT: 12.1 % (ref 10–15)
ERYTHROCYTE [SEDIMENTATION RATE] IN BLOOD BY WESTERGREN METHOD: 23 MM/HR (ref 0–30)
HCT VFR BLD AUTO: 41.5 % (ref 35–47)
HGB BLD-MCNC: 14.1 G/DL (ref 11.7–15.7)
MCH RBC QN AUTO: 31.3 PG (ref 26.5–33)
MCHC RBC AUTO-ENTMCNC: 34 G/DL (ref 31.5–36.5)
MCV RBC AUTO: 92 FL (ref 78–100)
PLATELET # BLD AUTO: 130 10E3/UL (ref 150–450)
RBC # BLD AUTO: 4.51 10E6/UL (ref 3.8–5.2)
WBC # BLD AUTO: 6.6 10E3/UL (ref 4–11)

## 2025-06-09 ENCOUNTER — VIRTUAL VISIT (OUTPATIENT)
Dept: PHARMACY | Facility: CLINIC | Age: 70
End: 2025-06-09
Attending: PHYSICIAN ASSISTANT
Payer: COMMERCIAL

## 2025-06-09 DIAGNOSIS — M05.741 RHEUMATOID ARTHRITIS INVOLVING BOTH HANDS WITH POSITIVE RHEUMATOID FACTOR (H): Primary | ICD-10-CM

## 2025-06-09 DIAGNOSIS — Z78.9 TAKES DIETARY SUPPLEMENTS: ICD-10-CM

## 2025-06-09 DIAGNOSIS — M05.742 RHEUMATOID ARTHRITIS INVOLVING BOTH HANDS WITH POSITIVE RHEUMATOID FACTOR (H): Primary | ICD-10-CM

## 2025-06-09 NOTE — PATIENT INSTRUCTIONS
"Recommendations from today's MTM visit:                                                      Start Orencia 125 mg every 7 days once approved by insurance.  Administration information: https://www.orenciahcp.com/dosing-and-administration/subcutaneous.html  Resource for sharps disposal: https://safeneedledisposal.org/search-results/#google_vignette  Continue sulfasalazine 1000 mg twice daily.  Lab monitoring per the guidance of Clarissa Griffin PA-C while on medication therapy.  Consider the following vaccine: Prevnar-20    Follow-up: with Clarissa Griffin PA-C 8/20/2025, with me 9/8/2025 and via [facility] as needed.    It was great speaking with you today.  I value your experience and would be very thankful for your time in providing feedback in our clinic survey. In the next few days, you may receive an email or text message from [employer] with a link to a survey related to your  clinical pharmacist.\"     To schedule another MTM appointment, please call the clinic directly or you may call the MTM scheduling line at [phone].    My Clinical Pharmacist's contact information:                                                      Please feel free to contact me with any questions or concerns you have.      Leonila Irby, PharmD  Medication Therapy Management Pharmacist  [facility]  Phone: [phone]   "

## 2025-06-09 NOTE — PROGRESS NOTES
Medication Therapy Management (MTM) Encounter    ASSESSMENT:                            Medication Adherence/Access: See below for considerations.    Rheumatoid Arthritis   Provided education on Orencia today including dosing, general administration, side effects (both common/serious), precautions, monitoring and time to efficacy. Reviewed option for prefilled syringe versus pen injector device, she would like to pursue the pen. Discussed data on malignancy and risk of serious infection in depth. Encouraged indicated non-live vaccines and avoidance of live vaccines. Patient with history of Guillain [place], recommend deferring Shingrix for now, should conisder PCV-20. Discussed potential need to hold therapy in the setting of signs/symptoms of active infection. Encouraged her to contact the rheumatology clinic in the event she has questions on this. Reviewed the specialty pharmacy and insurance approval processes. Will submit prescription today and pharmacy liaison will work on PA submission. Will provide Valeria with additional information as it becomes available. Patient has not received safety labs, will consult with rheumatologist if she would like these checked prior to starting therapy.    Supplements  Stable, continue current regimen.    PLAN:                            Start Orencia 125 mg every 7 days once approved by insurance.  Administration information: https://www.[facility]/dosing-and-administration/subcutaneous.html  Resource for sharps disposal: https://safeneedledisposal.org/search-results/#google_vignette  Continue sulfasalazine 1000 mg twice daily.  Lab monitoring per the guidance of Clarissa Griffin PA-C while on medication therapy.  Consider the following vaccine: Prevnar-20    Follow-up: with Clarissa Griffin PA-C 8/20/2025, with me 9/8/2025 and via [facility] as needed.    SUBJECTIVE/OBJECTIVE:                          Valeria Cruz is a 70 year old female seen for an initial visit. She was  referred to me from Clarissa Griffin PA-C.      Reason for visit: Orencia start.    Allergies/ADRs: Reviewed in chart  Past Medical History: Reviewed in chart  Tobacco: She reports that she has been smoking cigarettes. She started smoking about 44 years ago. She has a 22.2 pack-year smoking history. She has been exposed to tobacco smoke. She has never used smokeless tobacco.  Alcohol: Reviewed in chart    Medication Adherence/Access: no issues reported.    Rheumatoid Arthritis  - Abatacept (Orencia) 125 mg every 7 days   - Sulfasalazine 1000 mg twice daily     Side effects: stomach upset with higher dose of sulfasalazine.    Patient reports having increasing nodules. Some increased pain since coming off of methotrexate with transition to sulfasalazine. Just made final dose increase around 4 days ago. Has had some stomach upset since the increase, states its overall tolerable. Is open to starting Orencia. Has never given herself an injection before and has never used a specialty pharmacy. Does not have any concerns with the injection.    Symptoms: nodules, previously pain.    Affected areas: hands, shoulders    Specialist: Clarissa Griffin PA-C, Rheumatology. Last visit on 5/20/2025.     Previous treatment:   - Methotrexate    Pre-Biologic Screening:   Hep C Antibody  Non-reactive (12/4/2024)      Last lab monitoring completed: 6/5/2025    Immunization History   Pneumococcal  Pneumovax-23: 4/6/2017  Due to receive PCV-20   Tetanus/Tdap  Up-to-date   Shingrix Due to receive   RSV (only for >= 60 years old)  Eligible to receive    All patients on biologics should avoid live vaccines (varicella/VZV, intranasal influenza, MMR, or yellow fever vaccine (if traveling))         Supplements   - Vitamin C 500 mg once daily  - Cholecalciferol 1000 units once daily  - Calcium + D 1 tablet once daily  - Vitamin B-12 100 mcg once daily  - Ferrous sulfate 325 mg three times weekly  - Fish oil 1000 mg once daily  - Magnesium 400 mg  once daily  - Potassium once daily  - Turmeric 500 mg once daily  - Vitamin E 400 units once daily   No reported issues at this time.        Today's Vitals: LMP 05/20/2007 (LMP Unknown)   ----------------    I spent 30 minutes with this patient today. All changes were made via collaborative practice agreement with Clarissa Griffin.     A summary of these recommendations was sent via [employer].    Leonila Irby, PharmD  Medication Therapy Management Pharmacist  [facility]  Phone: [phone]    Telemedicine Visit Details  The patient's medications can be safely assessed via a telemedicine encounter.  Type of service:  Telephone visit  Originating Location (pt. Location): Home    Distant Location (provider location):  Off-site  Start Time: 12:30 PM  End Time: 1:00 PM     Medication Therapy Recommendations  No medication therapy recommendations to display

## 2025-06-09 NOTE — Clinical Note
Order entered for Orencia, no history of completion of safety labs would you like for these to be checked prior to her starting therapy?

## 2025-06-11 ENCOUNTER — RESULTS FOLLOW-UP (OUTPATIENT)
Dept: RHEUMATOLOGY | Facility: CLINIC | Age: 70
End: 2025-06-11
Payer: COMMERCIAL

## 2025-06-11 DIAGNOSIS — R76.8 CYCLIC CITRULLINATED PEPTIDE (CCP) ANTIBODY POSITIVE: ICD-10-CM

## 2025-06-11 DIAGNOSIS — Z79.899 HIGH RISK MEDICATION USE: ICD-10-CM

## 2025-06-11 DIAGNOSIS — M05.742 RHEUMATOID ARTHRITIS INVOLVING BOTH HANDS WITH POSITIVE RHEUMATOID FACTOR (H): Primary | ICD-10-CM

## 2025-06-11 DIAGNOSIS — Z72.89 OTHER PROBLEMS RELATED TO LIFESTYLE: ICD-10-CM

## 2025-06-11 DIAGNOSIS — M06.9 RHEUMATOID ARTHRITIS (H): ICD-10-CM

## 2025-06-11 DIAGNOSIS — Z11.59 ENCOUNTER FOR SCREENING FOR OTHER VIRAL DISEASES: ICD-10-CM

## 2025-06-11 DIAGNOSIS — M05.741 RHEUMATOID ARTHRITIS INVOLVING BOTH HANDS WITH POSITIVE RHEUMATOID FACTOR (H): Primary | ICD-10-CM

## 2025-06-11 NOTE — PROGRESS NOTES
Spoke with patient about need for safety labs prior to starting Orencia. Will have these done in the next day or two.

## 2025-06-26 ENCOUNTER — LAB (OUTPATIENT)
Dept: LAB | Facility: CLINIC | Age: 70
End: 2025-06-26
Payer: COMMERCIAL

## 2025-06-26 DIAGNOSIS — Z11.59 ENCOUNTER FOR SCREENING FOR OTHER VIRAL DISEASES: ICD-10-CM

## 2025-06-26 DIAGNOSIS — R76.8 CYCLIC CITRULLINATED PEPTIDE (CCP) ANTIBODY POSITIVE: ICD-10-CM

## 2025-06-26 DIAGNOSIS — M05.741 RHEUMATOID ARTHRITIS INVOLVING BOTH HANDS WITH POSITIVE RHEUMATOID FACTOR (H): ICD-10-CM

## 2025-06-26 DIAGNOSIS — M06.9 RHEUMATOID ARTHRITIS (H): ICD-10-CM

## 2025-06-26 DIAGNOSIS — M05.742 RHEUMATOID ARTHRITIS INVOLVING BOTH HANDS WITH POSITIVE RHEUMATOID FACTOR (H): ICD-10-CM

## 2025-06-26 DIAGNOSIS — Z79.899 HIGH RISK MEDICATION USE: ICD-10-CM

## 2025-06-26 DIAGNOSIS — Z72.89 OTHER PROBLEMS RELATED TO LIFESTYLE: ICD-10-CM

## 2025-06-26 LAB
ALBUMIN SERPL BCG-MCNC: 4 G/DL (ref 3.5–5.2)
ALT SERPL W P-5'-P-CCNC: 36 U/L (ref 0–50)
AST SERPL W P-5'-P-CCNC: 34 U/L (ref 0–45)
CREAT SERPL-MCNC: 0.8 MG/DL (ref 0.51–0.95)
EGFRCR SERPLBLD CKD-EPI 2021: 80 ML/MIN/1.73M2
ERYTHROCYTE [DISTWIDTH] IN BLOOD BY AUTOMATED COUNT: 11.9 % (ref 10–15)
HCT VFR BLD AUTO: 42.5 % (ref 35–47)
HGB BLD-MCNC: 14.2 G/DL (ref 11.7–15.7)
MCH RBC QN AUTO: 30.5 PG (ref 26.5–33)
MCHC RBC AUTO-ENTMCNC: 33.4 G/DL (ref 31.5–36.5)
MCV RBC AUTO: 91 FL (ref 78–100)
PLATELET # BLD AUTO: 172 10E3/UL (ref 150–450)
RBC # BLD AUTO: 4.66 10E6/UL (ref 3.8–5.2)
WBC # BLD AUTO: 6.8 10E3/UL (ref 4–11)

## 2025-06-28 ENCOUNTER — RESULTS FOLLOW-UP (OUTPATIENT)
Dept: MULTI SPECIALTY CLINIC | Facility: CLINIC | Age: 70
End: 2025-06-28

## 2025-06-28 LAB
GAMMA INTERFERON BACKGROUND BLD IA-ACNC: 0.07 IU/ML
M TB IFN-G BLD-IMP: NEGATIVE
M TB IFN-G CD4+ BCKGRND COR BLD-ACNC: 9.93 IU/ML
MITOGEN IGNF BCKGRD COR BLD-ACNC: 0.02 IU/ML
MITOGEN IGNF BCKGRD COR BLD-ACNC: 0.03 IU/ML
QUANTIFERON MITOGEN: 10 IU/ML
QUANTIFERON NIL TUBE: 0.07 IU/ML
QUANTIFERON TB1 TUBE: 0.09 IU/ML
QUANTIFERON TB2 TUBE: 0.1

## 2025-08-20 ENCOUNTER — OFFICE VISIT (OUTPATIENT)
Dept: RHEUMATOLOGY | Facility: CLINIC | Age: 70
End: 2025-08-20
Payer: COMMERCIAL

## 2025-08-20 ENCOUNTER — ANCILLARY PROCEDURE (OUTPATIENT)
Dept: GENERAL RADIOLOGY | Facility: CLINIC | Age: 70
End: 2025-08-20
Attending: PHYSICIAN ASSISTANT
Payer: COMMERCIAL

## 2025-08-20 VITALS
WEIGHT: 212.4 LBS | DIASTOLIC BLOOD PRESSURE: 62 MMHG | RESPIRATION RATE: 16 BRPM | SYSTOLIC BLOOD PRESSURE: 120 MMHG | OXYGEN SATURATION: 97 % | BODY MASS INDEX: 35.39 KG/M2 | TEMPERATURE: 97.9 F | HEART RATE: 88 BPM | HEIGHT: 65 IN

## 2025-08-20 DIAGNOSIS — R76.8 CYCLIC CITRULLINATED PEPTIDE (CCP) ANTIBODY POSITIVE: ICD-10-CM

## 2025-08-20 DIAGNOSIS — M05.742 RHEUMATOID ARTHRITIS INVOLVING BOTH HANDS WITH POSITIVE RHEUMATOID FACTOR (H): ICD-10-CM

## 2025-08-20 DIAGNOSIS — Z79.899 HIGH RISK MEDICATION USE: ICD-10-CM

## 2025-08-20 DIAGNOSIS — M05.741 RHEUMATOID ARTHRITIS INVOLVING BOTH HANDS WITH POSITIVE RHEUMATOID FACTOR (H): ICD-10-CM

## 2025-08-20 LAB
ALT SERPL W P-5'-P-CCNC: 21 U/L (ref 0–50)
AST SERPL W P-5'-P-CCNC: 24 U/L (ref 0–45)
BASOPHILS # BLD AUTO: <0.04 10E3/UL (ref 0–0.2)
BASOPHILS NFR BLD AUTO: 0.6 %
CREAT SERPL-MCNC: 0.86 MG/DL (ref 0.51–0.95)
CRP SERPL-MCNC: <3 MG/L
EGFRCR SERPLBLD CKD-EPI 2021: 73 ML/MIN/1.73M2
EOSINOPHIL # BLD AUTO: 0.09 10E3/UL (ref 0–0.7)
EOSINOPHIL NFR BLD AUTO: 1.8 %
ERYTHROCYTE [DISTWIDTH] IN BLOOD BY AUTOMATED COUNT: 11.6 % (ref 10–15)
ERYTHROCYTE [SEDIMENTATION RATE] IN BLOOD BY WESTERGREN METHOD: 6 MM/HR (ref 0–30)
HCT VFR BLD AUTO: 44.2 % (ref 35–47)
HGB BLD-MCNC: 15 G/DL (ref 11.7–15.7)
IMM GRANULOCYTES # BLD: <0.04 10E3/UL
IMM GRANULOCYTES NFR BLD: 0.2 %
LYMPHOCYTES # BLD AUTO: 2.21 10E3/UL (ref 0.8–5.3)
LYMPHOCYTES NFR BLD AUTO: 44.6 %
MCH RBC QN AUTO: 29.9 PG (ref 26.5–33)
MCHC RBC AUTO-ENTMCNC: 33.9 G/DL (ref 31.5–36.5)
MCV RBC AUTO: 88 FL (ref 78–100)
MONOCYTES # BLD AUTO: 0.46 10E3/UL (ref 0–1.3)
MONOCYTES NFR BLD AUTO: 9.3 %
NEUTROPHILS # BLD AUTO: 2.15 10E3/UL (ref 1.6–8.3)
NEUTROPHILS NFR BLD AUTO: 43.5 %
PLATELET # BLD AUTO: 154 10E3/UL (ref 150–450)
RBC # BLD AUTO: 5.02 10E6/UL (ref 3.8–5.2)
WBC # BLD AUTO: 4.95 10E3/UL (ref 4–11)

## 2025-08-20 PROCEDURE — 84460 ALANINE AMINO (ALT) (SGPT): CPT | Performed by: PHYSICIAN ASSISTANT

## 2025-08-20 PROCEDURE — 85025 COMPLETE CBC W/AUTO DIFF WBC: CPT | Performed by: PHYSICIAN ASSISTANT

## 2025-08-20 PROCEDURE — 85652 RBC SED RATE AUTOMATED: CPT | Performed by: PHYSICIAN ASSISTANT

## 2025-08-20 PROCEDURE — 3074F SYST BP LT 130 MM HG: CPT | Performed by: PHYSICIAN ASSISTANT

## 2025-08-20 PROCEDURE — 73130 X-RAY EXAM OF HAND: CPT | Mod: TC | Performed by: RADIOLOGY

## 2025-08-20 PROCEDURE — 82565 ASSAY OF CREATININE: CPT | Performed by: PHYSICIAN ASSISTANT

## 2025-08-20 PROCEDURE — 86140 C-REACTIVE PROTEIN: CPT | Performed by: PHYSICIAN ASSISTANT

## 2025-08-20 PROCEDURE — 99214 OFFICE O/P EST MOD 30 MIN: CPT | Performed by: PHYSICIAN ASSISTANT

## 2025-08-20 PROCEDURE — 84450 TRANSFERASE (AST) (SGOT): CPT | Performed by: PHYSICIAN ASSISTANT

## 2025-08-20 PROCEDURE — 36415 COLL VENOUS BLD VENIPUNCTURE: CPT | Performed by: PHYSICIAN ASSISTANT

## 2025-08-20 PROCEDURE — G2211 COMPLEX E/M VISIT ADD ON: HCPCS | Performed by: PHYSICIAN ASSISTANT

## 2025-08-20 PROCEDURE — 3078F DIAST BP <80 MM HG: CPT | Performed by: PHYSICIAN ASSISTANT

## 2025-08-20 RX ORDER — SULFASALAZINE 500 MG/1
TABLET, DELAYED RELEASE ORAL
Qty: 360 TABLET | Refills: 0 | Status: CANCELLED | OUTPATIENT
Start: 2025-08-20

## 2025-08-21 LAB — RADIOLOGIST FLAGS: ABNORMAL

## (undated) DEVICE — CATH TRAY FOLEY 16FR SILICONE 907416

## (undated) DEVICE — SOL NACL 0.9% IRRIG 1000ML BOTTLE 07138-09

## (undated) DEVICE — BLADE SAW SAGITTAL STRK 19.5X90X1.27MM 2108-109-000S11

## (undated) DEVICE — GLOVE PROTEXIS W/NEU-THERA 8.0  2D73TE80

## (undated) DEVICE — KIT DRAIN CLOSED WOUND SUCTION MED 400ML RESVR

## (undated) DEVICE — Device

## (undated) DEVICE — SOL NACL 0.9% IRRIG 3000ML BAG 07972-08

## (undated) DEVICE — GLOVE PROTEXIS BLUE W/NEU-THERA 8.5  2D73EB85

## (undated) DEVICE — HOOD T4 PROTECTIVE STERI FACE SHIELD 400-800

## (undated) DEVICE — SU VICRYL 2-0 CT-1 36" UND J945H

## (undated) DEVICE — GLOVE PROTEXIS BLUE W/NEU-THERA 7.5  2D73EB75

## (undated) DEVICE — SU DERMABOND PRINEO 22CM CLR222US

## (undated) DEVICE — IMP INSERT TIBIAL HOWM TRI SIZE 3 09MM 5532-G-309
Type: IMPLANTABLE DEVICE | Site: KNEE | Status: NON-FUNCTIONAL
Removed: 2017-04-05

## (undated) DEVICE — DRAPE STERI U 1015

## (undated) DEVICE — NDL 18GA 1.5" 305196

## (undated) DEVICE — PREP DURAPREP 26ML APL 8630

## (undated) DEVICE — BNDG COBAN 6"X5YDS STERILE

## (undated) DEVICE — STOCKING SLEEVE COMPRESSION CALF MED

## (undated) DEVICE — GOWN IMPERVIOUS SPECIALTY XLG/XLONG 32474

## (undated) DEVICE — SU STRATAFIX 3-0 MH 12" PS-2 SXMD1B103

## (undated) DEVICE — SUCTION IRR SYSTEM W/TIP INTERPULSE

## (undated) DEVICE — BLADE CLIPPER 4406

## (undated) DEVICE — SUCTION TIP FLEXI CLEAR TIP DISP K62

## (undated) DEVICE — BONE CLEANING TIP INTERPULSE  0210-010-000

## (undated) DEVICE — BONE CEMENT MIXEVAC III HI VAC KIT  0206-015-000

## (undated) DEVICE — SYR 50ML LL W/O NDL 309653

## (undated) DEVICE — SU PDO 1 STRATAFIX 36X36CM CTX TAPERPOINT SXPD2B405

## (undated) DEVICE — DRAPE SHEET REV FOLD 3/4 9349

## (undated) DEVICE — GOWN XLG DISP 9545

## (undated) DEVICE — GLOVE PROTEXIS W/NEU-THERA 7.5  2D73TE75

## (undated) DEVICE — SOL WATER IRRIG 1000ML BOTTLE 07139-09

## (undated) RX ORDER — EPINEPHRINE 1 MG/ML(1)
AMPUL (ML) INJECTION
Status: DISPENSED
Start: 2019-06-12

## (undated) RX ORDER — TRIAMCINOLONE ACETONIDE 40 MG/ML
INJECTION, SUSPENSION INTRA-ARTICULAR; INTRAMUSCULAR
Status: DISPENSED
Start: 2019-06-12

## (undated) RX ORDER — GABAPENTIN 300 MG/1
CAPSULE ORAL
Status: DISPENSED
Start: 2017-04-05

## (undated) RX ORDER — FENTANYL CITRATE 50 UG/ML
INJECTION, SOLUTION INTRAMUSCULAR; INTRAVENOUS
Status: DISPENSED
Start: 2017-04-05